# Patient Record
Sex: FEMALE | Race: WHITE | NOT HISPANIC OR LATINO | Employment: OTHER | URBAN - NONMETROPOLITAN AREA
[De-identification: names, ages, dates, MRNs, and addresses within clinical notes are randomized per-mention and may not be internally consistent; named-entity substitution may affect disease eponyms.]

---

## 2017-01-12 ENCOUNTER — TELEPHONE (OUTPATIENT)
Dept: MEDICAL GROUP | Facility: PHYSICIAN GROUP | Age: 67
End: 2017-01-12

## 2017-01-12 DIAGNOSIS — J45.20 MILD INTERMITTENT ASTHMA WITHOUT COMPLICATION: ICD-10-CM

## 2017-01-12 NOTE — TELEPHONE ENCOUNTER
Notify the patient that the insurance company would like us to try QVAR first. I sent a prescription over to the pharmacy. This is one puff 2 times a day.

## 2017-01-13 NOTE — TELEPHONE ENCOUNTER
Notified [facility] regarding Qvar RX. She will let us know how Qvar works for her.

## 2017-06-17 ENCOUNTER — OFFICE VISIT (OUTPATIENT)
Dept: URGENT CARE | Facility: PHYSICIAN GROUP | Age: 67
End: 2017-06-17
Payer: COMMERCIAL

## 2017-06-17 VITALS
OXYGEN SATURATION: 95 % | RESPIRATION RATE: 16 BRPM | BODY MASS INDEX: 30.44 KG/M2 | HEIGHT: 59 IN | TEMPERATURE: 99.6 F | HEART RATE: 67 BPM | SYSTOLIC BLOOD PRESSURE: 132 MMHG | WEIGHT: 151 LBS | DIASTOLIC BLOOD PRESSURE: 66 MMHG

## 2017-06-17 DIAGNOSIS — J45.41 MODERATE PERSISTENT ASTHMA WITH ACUTE EXACERBATION: ICD-10-CM

## 2017-06-17 DIAGNOSIS — J30.9 ALLERGIC RHINITIS, UNSPECIFIED ALLERGIC RHINITIS TRIGGER, UNSPECIFIED RHINITIS SEASONALITY: ICD-10-CM

## 2017-06-17 DIAGNOSIS — J06.9 VIRAL UPPER RESPIRATORY TRACT INFECTION: ICD-10-CM

## 2017-06-17 PROCEDURE — 99214 OFFICE O/P EST MOD 30 MIN: CPT | Performed by: EMERGENCY MEDICINE

## 2017-06-17 RX ORDER — PREDNISONE 20 MG/1
TABLET ORAL
Qty: 18 TAB | Refills: 0 | Status: SHIPPED | OUTPATIENT
Start: 2017-06-17 | End: 2018-01-24

## 2017-06-17 RX ORDER — AMOXICILLIN AND CLAVULANATE POTASSIUM 875; 125 MG/1; MG/1
1 TABLET, FILM COATED ORAL 2 TIMES DAILY
Qty: 14 TAB | Refills: 0 | Status: SHIPPED | OUTPATIENT
Start: 2017-06-17 | End: 2017-06-24

## 2017-06-17 ASSESSMENT — ENCOUNTER SYMPTOMS
HEARTBURN: 0
NAUSEA: 0
DIARRHEA: 0
SPUTUM PRODUCTION: 0
HEMOPTYSIS: 0
HOARSE VOICE: 0
HEADACHES: 1
WHEEZING: 1
SHORTNESS OF BREATH: 1
ABDOMINAL PAIN: 0
SINUS PRESSURE: 1
SORE THROAT: 1
VOMITING: 0
COUGH: 1
CHILLS: 0

## 2017-06-17 NOTE — PATIENT INSTRUCTIONS
"Use saline nasal irrigation, such as with a Neti Pot, as needed daily for relief of nasal or sinus congestion relief.  Use over-the-counter oxymetazoline (Afrin) nasal spray as needed for up to 3 days only; follow package instructions for dosing.  Use over-the-counter acetaminophen (Tylenol) as needed for pain relief; follow the package instructions for dosing.  Use the prescribed antibiotic Augmentin only if symptoms persist beyond 7 days, or re-worsen. Use an oral probiotic daily, such as Culturelle, Align, or yogurt to reduce gastrointestinal symptoms.  Go to the nearest hospital Emergency Department, or call 911, if any worsening breathing condition.      Upper Respiratory Infection, Adult  Most upper respiratory infections (URIs) are caused by a virus. A URI affects the nose, throat, and upper air passages. The most common type of URI is often called \"the common cold.\"  HOME CARE   · Take medicines only as told by your doctor.  · Gargle warm saltwater or take cough drops to comfort your throat as told by your doctor.  · Use a warm mist humidifier or inhale steam from a shower to increase air moisture. This may make it easier to breathe.  · Drink enough fluid to keep your pee (urine) clear or pale yellow.  · Eat soups and other clear broths.  · Have a healthy diet.  · Rest as needed.  · Go back to work when your fever is gone or your doctor says it is okay.  ¨ You may need to stay home longer to avoid giving your URI to others.  ¨ You can also wear a face mask and wash your hands often to prevent spread of the virus.  · Use your inhaler more if you have asthma.  · Do not use any tobacco products, including cigarettes, chewing tobacco, or electronic cigarettes. If you need help quitting, ask your doctor.  GET HELP IF:  · You are getting worse, not better.  · Your symptoms are not helped by medicine.  · You have chills.  · You are getting more short of breath.  · You have brown or red mucus.  · You have yellow or " brown discharge from your nose.  · You have pain in your face, especially when you bend forward.  · You have a fever.  · You have puffy (swollen) neck glands.  · You have pain while swallowing.  · You have white areas in the back of your throat.  GET HELP RIGHT AWAY IF:   · You have very bad or constant:  ¨ Headache.  ¨ Ear pain.  ¨ Pain in your forehead, behind your eyes, and over your cheekbones (sinus pain).  ¨ Chest pain.  · You have long-lasting (chronic) lung disease and any of the following:  ¨ Wheezing.  ¨ Long-lasting cough.  ¨ Coughing up blood.  ¨ A change in your usual mucus.  · You have a stiff neck.  · You have changes in your:  ¨ Vision.  ¨ Hearing.  ¨ Thinking.  ¨ Mood.  MAKE SURE YOU:   · Understand these instructions.  · Will watch your condition.  · Will get help right away if you are not doing well or get worse.     This information is not intended to replace advice given to you by your health care provider. Make sure you discuss any questions you have with your health care provider.     Document Released: 06/05/2009 Document Revised: 05/03/2016 Document Reviewed: 03/25/2015  [employer] Interactive Patient Education ©2016 [employer]  Bronchospasm, Adult  A bronchospasm is when the tubes that carry air in and out of your lungs (airways) spasm or tighten. During a bronchospasm it is hard to breathe. This is because the airways get smaller. A bronchospasm can be triggered by:  · Allergies. These may be to animals, pollen, food, or mold.  · Infection. This is a common cause of bronchospasm.  · Exercise.  · Irritants. These include pollution, cigarette smoke, strong odors, aerosol sprays, and paint fumes.  · Weather changes.  · Stress.  · Being emotional.  HOME CARE   · Always have a plan for getting help. Know when to call your doctor and local emergency services (911 in the [place] Know where you can get emergency care.  · Only take medicines as told by your doctor.  · If you were prescribed an  inhaler or nebulizer machine, ask your doctor how to use it correctly. Always use a spacer with your inhaler if you were given one.  · Stay calm during an attack. Try to relax and breathe more slowly.  · Control your home environment:  · Change your heating and air conditioning filter at least once a month.  · Limit your use of fireplaces and wood stoves.  · Do not  smoke. Do not  allow smoking in your home.  · Avoid perfumes and fragrances.  · Get rid of pests (such as roaches and mice) and their droppings.  · Throw away plants if you see mold on them.  · Keep your house clean and dust free.  · Replace carpet with wood, tile, or vinyl rodolfo. Carpet can trap dander and dust.  · Use allergy-proof pillows, mattress covers, and box spring covers.  · Wash bed sheets and blankets every week in hot water. Dry them in a dryer.  · Use blankets that are made of polyester or cotton.  · Wash hands frequently.  GET HELP IF:  · You have muscle aches.  · You have chest pain.  · The thick spit you spit or cough up (sputum) changes from clear or white to yellow, green, gray, or bloody.  · The thick spit you spit or cough up gets thicker.  · There are problems that may be related to the medicine you are given such as:  ¨ A rash.  ¨ Itching.  ¨ Swelling.  ¨ Trouble breathing.  GET HELP RIGHT AWAY IF:  · You feel you cannot breathe or catch your breath.  · You cannot stop coughing.  · Your treatment is not helping you breathe better.  · You have very bad chest pain.  MAKE SURE YOU:   · Understand these instructions.  · Will watch your condition.  · Will get help right away if you are not doing well or get worse.     This information is not intended to replace advice given to you by your health care provider. Make sure you discuss any questions you have with your health care provider.     Document Released: 10/15/2010 Document Revised: 01/08/2016 Document Reviewed: 06/10/2014  [employer] Interactive Patient Education ©2016 [employer]  Allergic Rhinitis  Allergic rhinitis is when the mucous membranes in the nose respond to allergens. Allergens are particles in the air that cause your body to have an allergic reaction. This causes you to release allergic antibodies. Through a chain of events, these eventually cause you to release histamine into the blood stream. Although meant to protect the body, it is this release of histamine that causes your discomfort, such as frequent sneezing, congestion, and an itchy, runny nose.   CAUSES  Seasonal allergic rhinitis (hay fever) is caused by pollen allergens that may come from grasses, trees, and weeds. Year-round allergic rhinitis (perennial allergic rhinitis) is caused by allergens such as house dust mites, pet dander, and mold spores.  SYMPTOMS  · Nasal stuffiness (congestion).  · Itchy, runny nose with sneezing and tearing of the eyes.  DIAGNOSIS  Your health care provider can help you determine the allergen or allergens that trigger your symptoms. If you and your health care provider are unable to determine the allergen, skin or blood testing may be used. Your health care provider will diagnose your condition after taking your health history and performing a physical exam. Your health care provider may assess you for other related conditions, such as asthma, pink eye, or an ear infection.  TREATMENT  Allergic rhinitis does not have a cure, but it can be controlled by:  · Medicines that block allergy symptoms. These may include allergy shots, nasal sprays, and oral antihistamines.  · Avoiding the allergen.  Hay fever may often be treated with antihistamines in pill or nasal spray forms. Antihistamines block the effects of histamine. There are over-the-counter medicines that may help with nasal congestion and swelling around the eyes. Check with your health care provider before taking or giving this medicine.  If avoiding the allergen or the medicine prescribed do not work, there are many new  medicines your health care provider can prescribe. Stronger medicine may be used if initial measures are ineffective. Desensitizing injections can be used if medicine and avoidance does not work. Desensitization is when a patient is given ongoing shots until the body becomes less sensitive to the allergen. Make sure you follow up with your health care provider if problems continue.  HOME CARE INSTRUCTIONS  It is not possible to completely avoid allergens, but you can reduce your symptoms by taking steps to limit your exposure to them. It helps to know exactly what you are allergic to so that you can avoid your specific triggers.  SEEK MEDICAL CARE IF:  · You have a fever.  · You develop a cough that does not stop easily (persistent).  · You have shortness of breath.  · You start wheezing.  · Symptoms interfere with normal daily activities.     This information is not intended to replace advice given to you by your health care provider. Make sure you discuss any questions you have with your health care provider.     Document Released: 09/12/2002 Document Revised: 01/08/2016 Document Reviewed: 08/25/2014  [employer] Interactive Patient Education ©2016 [employer] Inc.

## 2017-06-17 NOTE — PROGRESS NOTES
Subjective:      Ezio Mora is a 66 y.o. female who presents with Sinus Problem            Sinus Problem  This is a recurrent problem. Episode onset: 2 days. The problem has been gradually worsening since onset. There has been no fever. The pain is moderate. Associated symptoms include congestion, coughing, headaches, shortness of breath, sinus pressure and a sore throat. Pertinent negatives include no chills, ear pain, hoarse voice or sneezing. Treatments tried: rest. The treatment provided no relief.    notes over the past week exacerbation of allergic rhinitis, increased need for rescue inhaler for asthma.  Upcoming out of state travel.    Review of Systems   Constitutional: Positive for malaise/fatigue. Negative for chills.   HENT: Positive for congestion, sinus pressure and sore throat. Negative for ear pain, hearing loss, hoarse voice, nosebleeds and sneezing.    Respiratory: Positive for cough, shortness of breath and wheezing. Negative for hemoptysis and sputum production.    Cardiovascular: Negative for chest pain.   Gastrointestinal: Negative for heartburn, nausea, vomiting, abdominal pain and diarrhea.   Skin: Negative for rash.   Neurological: Positive for headaches.   Endo/Heme/Allergies: Positive for environmental allergies.     PMH:  has a past medical history of Post-menopause on HRT (hormone replacement therapy); ASTHMA; Rheumatic fever; Degenerative arthritis of spine; Mild intermittent asthma with acute exacerbation (6/18/2015); Weight gain (6/28/2016); Pulmonary nodule (6/28/2016); and Chronic fatigue (9/28/2016).  MEDS:   Current outpatient prescriptions:   •  predniSONE (DELTASONE) 20 MG Tab, Take 3 tablets once a day for 4 days, then 2 tablets once a day for 3 days, Disp: 18 Tab, Rfl: 0  •  Albuterol Sulfate 108 (90 BASE) MCG/ACT AEROSOL POWDER, BREATH ACTIVATED, Inhale 1-2 Puffs by mouth every 6 hours as needed (coughing, wheezing)., Disp: 1 Each, Rfl: 0  •  amoxicillin-clavulanate  "(AUGMENTIN) 875-125 MG Tab, Take 1 Tab by mouth 2 times a day for 7 days., Disp: 14 Tab, Rfl: 0  •  PROAIR  (90 BASE) MCG/ACT Aero Soln inhalation aerosol, INHALE 2 PUFFS BY MOUTH EVERY 6 HOURS AS NEEDED, Disp: 8.5 g, Rfl: 3  •  beclomethasone (QVAR) 80 MCG/ACT inhaler, Inhale 1 Puff by mouth 2 times a day., Disp: 7.3 g, Rfl: 5  •  montelukast (SINGULAIR) 10 MG Tab, TAKE 1 TABLET BY MOUTH DAILY, Disp: 30 Tab, Rfl: 11  •  fluticasone (FLONASE) 50 MCG/ACT nasal spray, Spray 1 Spray in nose every day., Disp: 16 g, Rfl: 11  •  azithromycin (ZITHROMAX) 250 MG Tab, TK 2 TS TODAY THEN 1 T PO QD ON DAYS 2-5, Disp: , Rfl: 0  •  FLOVENT DISKUS 250 MCG/BLIST AEROSOL POWDER, BREATH ACTIVATED, INHALE 1  PUFF PO BID, Disp: , Rfl: 5  •  fluticasone (FLOVENT HFA) 220 MCG/ACT AERO, Inhale 1 Puff by mouth 2 times a day., Disp: 1 Inhaler, Rfl: 3  ALLERGIES:   Allergies   Allergen Reactions   • Terramycin [Oxytetracycline]      SURGHX:   Past Surgical History   Procedure Laterality Date   • Primary c section     • Tubal ligation     • Lumbar fusion posterior       SOCHX:  reports that she has never smoked. She has never used smokeless tobacco. She reports that she drinks about 1.2 oz of alcohol per week. She reports that she does not use illicit drugs.  FH: family history includes Other in her mother; Thyroid in her child. There is no history of Heart Disease.       Objective:     /66 mmHg  Pulse 67  Temp(Src) 37.6 °C (99.6 °F)  Resp 16  Ht 1.511 m (4' 11.49\")  Wt 68.493 kg (151 lb)  BMI 30.00 kg/m2  SpO2 95%  Breastfeeding? No     Physical Exam   Constitutional: She appears well-developed and well-nourished. She is cooperative. She does not appear ill. No distress.   HENT:   Right Ear: Tympanic membrane and ear canal normal.   Left Ear: Tympanic membrane and ear canal normal.   Nose: Mucosal edema and rhinorrhea present.   Mouth/Throat: Uvula is midline and oropharynx is clear and moist.   Eyes: Conjunctivae are " normal.   Neck: Trachea normal. Neck supple.   Cardiovascular: Normal rate, regular rhythm and normal heart sounds.    Pulmonary/Chest: Effort normal. She has no decreased breath sounds. She has no wheezes. She has no rhonchi. She has no rales.   Lymphadenopathy:     She has no cervical adenopathy.   Neurological: She is alert.   Skin: Skin is warm and dry.   Psychiatric: She has a normal mood and affect.               Assessment/Plan:     1. Allergic rhinitis, unspecified allergic rhinitis trigger, unspecified rhinitis seasonality  Recommended nasal saline irrigation daily, OTC inhaled nasal steroid daily, OTC nonsedating antihistamine when necessary as tolerated.    2. Moderate persistent asthma with acute exacerbation  - predniSONE (DELTASONE) 20 MG Tab; Take 3 tablets once a day for 4 days, then 2 tablets once a day for 3 days  Dispense: 18 Tab; Refill: 0  - Albuterol Sulfate 108 (90 BASE) MCG/ACT AEROSOL POWDER, BREATH ACTIVATED; Inhale 1-2 Puffs by mouth every 6 hours as needed (coughing, wheezing).  Dispense: 1 Each; Refill: 0    3. Viral upper respiratory tract infection  Recommended supportive care measures, including rest, increasing oral fluid intake and use of over-the-counter medications for relief of symptoms.  Wait-and-see antibiotic provided due to history of recurring sinusitis and upcoming travel  - amoxicillin-clavulanate (AUGMENTIN) 875-125 MG Tab; Take 1 Tab by mouth 2 times a day for 7 days.  Dispense: 14 Tab; Refill: 0

## 2017-06-17 NOTE — MR AVS SNAPSHOT
"        Ezio Mora   2017 2:35 PM   Office Visit   MRN: [id number]    Department:  [facility]   Dept Phone:  [phone]    Description:  Female : 1950   Provider:  Dinesh Hughes M.D.           Reason for Visit     Sinus Problem x2days nasal congestion/pain, headache, ear pain      Allergies as of 2017     Allergen Noted Reactions    Terramycin [Oxytetracycline] 2015         You were diagnosed with     Allergic rhinitis, unspecified allergic rhinitis trigger, unspecified rhinitis seasonality   [9366775]       Moderate persistent asthma with acute exacerbation   [9690676]       Viral upper respiratory tract infection   [[id number]]         Vital Signs     Blood Pressure Pulse Temperature Respirations Height Weight    132/66 mmHg 67 37.6 °C (99.6 °F) 16 1.511 m (4' 11.49\") 68.493 kg (151 lb)    Body Mass Index Oxygen Saturation Breastfeeding? Smoking Status          30.00 kg/m2 95% No Never Smoker         Basic Information     Date Of Birth Sex Race Ethnicity Preferred Language    1950 Female White Non- English      Problem List              ICD-10-CM Priority Class Noted - Resolved    Asthma J45.909   10/28/2014 - Present    Pulmonary nodule R91.1   2016 - Present    Blocked eustachian tube H68.109   2016 - Present      Health Maintenance        Date Due Completion Dates    IMM ZOSTER VACCINE 10/20/2011 ---    BONE DENSITY 10/20/2016 ---    IMM PNEUMOCOCCAL 65+ (ADULT) LOW/MEDIUM RISK SERIES (1 of 2 - PCV13) 10/20/2016 ---    PAP SMEAR 2017 (Done)    Override on 2014: Done ([facility])    MAMMOGRAM 2017, 10/28/2012 (Done)    Override on 10/28/2012: Done (banner)    COLONOSCOPY 10/28/2019 10/28/2009 (Done)    Override on 10/28/2009: Done (Gastrointerologist Kwan)    IMM DTaP/Tdap/Td Vaccine (2 - Td) 2022            Current Immunizations     Tdap Vaccine 2012      Below and/or attached are the medications " your provider expects you to take. Review all of your home medications and newly ordered medications with your provider and/or pharmacist. Follow medication instructions as directed by your provider and/or pharmacist. Please keep your medication list with you and share with your provider. Update the information when medications are discontinued, doses are changed, or new medications (including over-the-counter products) are added; and carry medication information at all times in the event of emergency situations     Allergies:  TERRAMYCIN - (reactions not documented)               Medications  Valid as of: June 17, 2017 -  2:55 PM    Generic Name Brand Name Tablet Size Instructions for use    Albuterol Sulfate (Aero Soln) PROAIR  (90 BASE) MCG/ACT INHALE 2 PUFFS BY MOUTH EVERY 6 HOURS AS NEEDED        Albuterol Sulfate (AEROSOL POWDER, BREATH ACTIVATED) Albuterol Sulfate 108 (90 BASE) MCG/ACT Inhale 1-2 Puffs by mouth every 6 hours as needed (coughing, wheezing).        Amoxicillin-Pot Clavulanate (Tab) AUGMENTIN 875-125 MG Take 1 Tab by mouth 2 times a day for 7 days.        Azithromycin (Tab) ZITHROMAX 250 MG TK 2 TS TODAY THEN 1 T PO QD ON DAYS 2-5        Beclomethasone Dipropionate (Aero Soln) QVAR 80 MCG/ACT Inhale 1 Puff by mouth 2 times a day.        Fluticasone Propionate (Suspension) FLONASE 50 MCG/ACT Spray 1 Spray in nose every day.        Fluticasone Propionate (Inhal) (AEROSOL POWDER, BREATH ACTIVATED) FLOVENT DISKUS 250 MCG/BLIST INHALE 1  PUFF PO BID        Fluticasone Propionate HFA (Aerosol) FLOVENT  MCG/ACT Inhale 1 Puff by mouth 2 times a day.        Montelukast Sodium (Tab) SINGULAIR 10 MG TAKE 1 TABLET BY MOUTH DAILY        PredniSONE (Tab) DELTASONE 20 MG Take 3 tablets once a day for 4 days, then 2 tablets once a day for 3 days        .                 Medicines prescribed today were sent to:     [id number] - [place], [place] - [address] AT [facility] [address]     [address] [place] [address]    Phone: [phone] Fax: [phone]    Open 24 Hours?: No      Medication refill instructions:       If your prescription bottle indicates you have medication refills left, it is not necessary to call your provider’s office. Please contact your pharmacy and they will refill your medication.    If your prescription bottle indicates you do not have any refills left, you may request refills at any time through one of the following ways: The online [employer] system (except Urgent Care), by calling your provider’s office, or by asking your pharmacy to contact your provider’s office with a refill request. Medication refills are processed only during regular business hours and may not be available until the next business day. Your provider may request additional information or to have a follow-up visit with you prior to refilling your medication.   *Please Note: Medication refills are assigned a new Rx number when refilled electronically. Your pharmacy may indicate that no refills were authorized even though a new prescription for the same medication is available at the pharmacy. Please request the medicine by name with the pharmacy before contacting your provider for a refill.        Instructions    Use saline nasal irrigation, such as with a Neti Pot, as needed daily for relief of nasal or sinus congestion relief.  Use over-the-counter oxymetazoline (Afrin) nasal spray as needed for up to 3 days only; follow package instructions for dosing.  Use over-the-counter acetaminophen (Tylenol) as needed for pain relief; follow the package instructions for dosing.  Use the prescribed antibiotic Augmentin only if symptoms persist beyond 7 days, or re-worsen. Use an oral probiotic daily, such as Culturelle, Align, or yogurt to reduce gastrointestinal symptoms.  Go to the nearest hospital Emergency Department, or call 911, if any worsening breathing condition.      Upper Respiratory Infection,  "Adult  Most upper respiratory infections (URIs) are caused by a virus. A URI affects the nose, throat, and upper air passages. The most common type of URI is often called \"the common cold.\"  HOME CARE   · Take medicines only as told by your doctor.  · Gargle warm saltwater or take cough drops to comfort your throat as told by your doctor.  · Use a warm mist humidifier or inhale steam from a shower to increase air moisture. This may make it easier to breathe.  · Drink enough fluid to keep your pee (urine) clear or pale yellow.  · Eat soups and other clear broths.  · Have a healthy diet.  · Rest as needed.  · Go back to work when your fever is gone or your doctor says it is okay.  ¨ You may need to stay home longer to avoid giving your URI to others.  ¨ You can also wear a face mask and wash your hands often to prevent spread of the virus.  · Use your inhaler more if you have asthma.  · Do not use any tobacco products, including cigarettes, chewing tobacco, or electronic cigarettes. If you need help quitting, ask your doctor.  GET HELP IF:  · You are getting worse, not better.  · Your symptoms are not helped by medicine.  · You have chills.  · You are getting more short of breath.  · You have brown or red mucus.  · You have yellow or brown discharge from your nose.  · You have pain in your face, especially when you bend forward.  · You have a fever.  · You have puffy (swollen) neck glands.  · You have pain while swallowing.  · You have white areas in the back of your throat.  GET HELP RIGHT AWAY IF:   · You have very bad or constant:  ¨ Headache.  ¨ Ear pain.  ¨ Pain in your forehead, behind your eyes, and over your cheekbones (sinus pain).  ¨ Chest pain.  · You have long-lasting (chronic) lung disease and any of the following:  ¨ Wheezing.  ¨ Long-lasting cough.  ¨ Coughing up blood.  ¨ A change in your usual mucus.  · You have a stiff neck.  · You have changes in " your:  ¨ Vision.  ¨ Hearing.  ¨ Thinking.  ¨ Mood.  MAKE SURE YOU:   · Understand these instructions.  · Will watch your condition.  · Will get help right away if you are not doing well or get worse.     This information is not intended to replace advice given to you by your health care provider. Make sure you discuss any questions you have with your health care provider.     Document Released: 06/05/2009 Document Revised: 05/03/2016 Document Reviewed: 03/25/2015  [employer] Interactive Patient Education ©2016 [employer] Inc.  Bronchospasm, Adult  A bronchospasm is when the tubes that carry air in and out of your lungs (airways) spasm or tighten. During a bronchospasm it is hard to breathe. This is because the airways get smaller. A bronchospasm can be triggered by:  · Allergies. These may be to animals, pollen, food, or mold.  · Infection. This is a common cause of bronchospasm.  · Exercise.  · Irritants. These include pollution, cigarette smoke, strong odors, aerosol sprays, and paint fumes.  · Weather changes.  · Stress.  · Being emotional.  HOME CARE   · Always have a plan for getting help. Know when to call your doctor and local emergency services (911 in the [place] Know where you can get emergency care.  · Only take medicines as told by your doctor.  · If you were prescribed an inhaler or nebulizer machine, ask your doctor how to use it correctly. Always use a spacer with your inhaler if you were given one.  · Stay calm during an attack. Try to relax and breathe more slowly.  · Control your home environment:  · Change your heating and air conditioning filter at least once a month.  · Limit your use of fireplaces and wood stoves.  · Do not  smoke. Do not  allow smoking in your home.  · Avoid perfumes and fragrances.  · Get rid of pests (such as roaches and mice) and their droppings.  · Throw away plants if you see mold on them.  · Keep your house clean and dust free.  · Replace carpet with wood, tile, or vinyl  rodolfo. Carpet can trap dander and dust.  · Use allergy-proof pillows, mattress covers, and box spring covers.  · Wash bed sheets and blankets every week in hot water. Dry them in a dryer.  · Use blankets that are made of polyester or cotton.  · Wash hands frequently.  GET HELP IF:  · You have muscle aches.  · You have chest pain.  · The thick spit you spit or cough up (sputum) changes from clear or white to yellow, green, gray, or bloody.  · The thick spit you spit or cough up gets thicker.  · There are problems that may be related to the medicine you are given such as:  ¨ A rash.  ¨ Itching.  ¨ Swelling.  ¨ Trouble breathing.  GET HELP RIGHT AWAY IF:  · You feel you cannot breathe or catch your breath.  · You cannot stop coughing.  · Your treatment is not helping you breathe better.  · You have very bad chest pain.  MAKE SURE YOU:   · Understand these instructions.  · Will watch your condition.  · Will get help right away if you are not doing well or get worse.     This information is not intended to replace advice given to you by your health care provider. Make sure you discuss any questions you have with your health care provider.     Document Released: 10/15/2010 Document Revised: 01/08/2016 Document Reviewed: 06/10/2014  [employer] Interactive Patient Education ©2016 [employer]  Allergic Rhinitis  Allergic rhinitis is when the mucous membranes in the nose respond to allergens. Allergens are particles in the air that cause your body to have an allergic reaction. This causes you to release allergic antibodies. Through a chain of events, these eventually cause you to release histamine into the blood stream. Although meant to protect the body, it is this release of histamine that causes your discomfort, such as frequent sneezing, congestion, and an itchy, runny nose.   CAUSES  Seasonal allergic rhinitis (hay fever) is caused by pollen allergens that may come from grasses, trees, and weeds. Year-round allergic  rhinitis (perennial allergic rhinitis) is caused by allergens such as house dust mites, pet dander, and mold spores.  SYMPTOMS  · Nasal stuffiness (congestion).  · Itchy, runny nose with sneezing and tearing of the eyes.  DIAGNOSIS  Your health care provider can help you determine the allergen or allergens that trigger your symptoms. If you and your health care provider are unable to determine the allergen, skin or blood testing may be used. Your health care provider will diagnose your condition after taking your health history and performing a physical exam. Your health care provider may assess you for other related conditions, such as asthma, pink eye, or an ear infection.  TREATMENT  Allergic rhinitis does not have a cure, but it can be controlled by:  · Medicines that block allergy symptoms. These may include allergy shots, nasal sprays, and oral antihistamines.  · Avoiding the allergen.  Hay fever may often be treated with antihistamines in pill or nasal spray forms. Antihistamines block the effects of histamine. There are over-the-counter medicines that may help with nasal congestion and swelling around the eyes. Check with your health care provider before taking or giving this medicine.  If avoiding the allergen or the medicine prescribed do not work, there are many new medicines your health care provider can prescribe. Stronger medicine may be used if initial measures are ineffective. Desensitizing injections can be used if medicine and avoidance does not work. Desensitization is when a patient is given ongoing shots until the body becomes less sensitive to the allergen. Make sure you follow up with your health care provider if problems continue.  HOME CARE INSTRUCTIONS  It is not possible to completely avoid allergens, but you can reduce your symptoms by taking steps to limit your exposure to them. It helps to know exactly what you are allergic to so that you can avoid your specific triggers.  SEEK MEDICAL  CARE IF:  · You have a fever.  · You develop a cough that does not stop easily (persistent).  · You have shortness of breath.  · You start wheezing.  · Symptoms interfere with normal daily activities.     This information is not intended to replace advice given to you by your health care provider. Make sure you discuss any questions you have with your health care provider.     Document Released: 09/12/2002 Document Revised: 01/08/2016 Document Reviewed: 08/25/2014  [employer] Interactive Patient Education ©2016 Elsevier Inc.            ipsy Access Code: Activation code not generated  Current ipsy Status: Active

## 2017-08-07 ENCOUNTER — OFFICE VISIT (OUTPATIENT)
Dept: MEDICAL GROUP | Facility: PHYSICIAN GROUP | Age: 67
End: 2017-08-07
Payer: COMMERCIAL

## 2017-08-07 VITALS
BODY MASS INDEX: 30.24 KG/M2 | HEART RATE: 71 BPM | HEIGHT: 59 IN | WEIGHT: 150 LBS | RESPIRATION RATE: 16 BRPM | SYSTOLIC BLOOD PRESSURE: 162 MMHG | DIASTOLIC BLOOD PRESSURE: 78 MMHG | TEMPERATURE: 97.9 F | OXYGEN SATURATION: 98 %

## 2017-08-07 DIAGNOSIS — J45.40 MODERATE PERSISTENT ASTHMA WITHOUT COMPLICATION: ICD-10-CM

## 2017-08-07 DIAGNOSIS — J45.20 MILD INTERMITTENT ASTHMA WITHOUT COMPLICATION: ICD-10-CM

## 2017-08-07 DIAGNOSIS — R00.2 PALPITATIONS: ICD-10-CM

## 2017-08-07 PROCEDURE — 99214 OFFICE O/P EST MOD 30 MIN: CPT | Performed by: INTERNAL MEDICINE
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
## 2017-08-07 NOTE — MR AVS SNAPSHOT
"        Ezio Mora   2017 8:40 AM   Office Visit   MRN: [id number]    Department:  [facility]   Dept Phone:  [phone]    Description:  Female : 1950   Provider:  Lion Masters M.D.           Reason for Visit     Shortness of Breath           Allergies as of 2017     Allergen Noted Reactions    Terramycin [Oxytetracycline] 2015         You were diagnosed with     Mild intermittent asthma without complication   [462293]       Palpitations   [785.1.ICD-9-CM]       Moderate persistent asthma without complication   [504003]         Vital Signs     Blood Pressure Pulse Temperature Respirations Height Weight    162/78 mmHg 71 36.6 °C (97.9 °F) 16 1.499 m (4' 11\") 68.04 kg (150 lb)    Body Mass Index Oxygen Saturation Smoking Status             30.28 kg/m2 98% Never Smoker          Basic Information     Date Of Birth Sex Race Ethnicity Preferred Language    1950 Female White Non- English      Problem List              ICD-10-CM Priority Class Noted - Resolved    Moderate persistent asthma without complication J45.40   10/28/2014 - Present    Pulmonary nodule R91.1   2016 - Present    Blocked eustachian tube H68.109   2016 - Present    Palpitations R00.2   2017 - Present      Health Maintenance        Date Due Completion Dates    IMM ZOSTER VACCINE 10/20/2011 ---    BONE DENSITY 10/20/2016 ---    IMM PNEUMOCOCCAL 65+ (ADULT) LOW/MEDIUM RISK SERIES (1 of 2 - PCV13) 10/20/2016 ---    PAP SMEAR 2017 (Done)    Override on 2014: Done ([facility])    IMM INFLUENZA (1) 2017 ---    MAMMOGRAM 2017, 10/28/2012 (Done)    Override on 10/28/2012: Done (banner)    COLONOSCOPY 10/28/2019 10/28/2009 (Done)    Override on 10/28/2009: Done (Gastrointerologist [facility]wan)    IMM DTaP/Tdap/Td Vaccine (2 - Td) 2022            Current Immunizations     Tdap Vaccine 2012      Below and/or attached are the medications your provider " expects you to take. Review all of your home medications and newly ordered medications with your provider and/or pharmacist. Follow medication instructions as directed by your provider and/or pharmacist. Please keep your medication list with you and share with your provider. Update the information when medications are discontinued, doses are changed, or new medications (including over-the-counter products) are added; and carry medication information at all times in the event of emergency situations     Allergies:  TERRAMYCIN - (reactions not documented)               Medications  Valid as of: August 07, 2017 - 10:36 AM    Generic Name Brand Name Tablet Size Instructions for use    Albuterol Sulfate (Aero Soln) PROAIR  (90 BASE) MCG/ACT INHALE 2 PUFFS BY MOUTH EVERY 6 HOURS AS NEEDED        Albuterol Sulfate (AEROSOL POWDER, BREATH ACTIVATED) Albuterol Sulfate 108 (90 BASE) MCG/ACT Inhale 1-2 Puffs by mouth every 6 hours as needed (coughing, wheezing).        Beclomethasone Dipropionate (Aero Soln) QVAR 80 MCG/ACT Inhale 1 Puff by mouth 2 times a day.        Fluticasone Propionate (Suspension) FLONASE 50 MCG/ACT Spray 1 Spray in nose every day.        Fluticasone Propionate (Inhal) (AEROSOL POWDER, BREATH ACTIVATED) FLOVENT DISKUS 250 MCG/BLIST INHALE 1  PUFF PO BID        Montelukast Sodium (Tab) SINGULAIR 10 MG TAKE 1 TABLET BY MOUTH DAILY        PredniSONE (Tab) DELTASONE 20 MG Take 3 tablets once a day for 4 days, then 2 tablets once a day for 3 days        Salmeterol Xinafoate (AEROSOL POWDER, BREATH ACTIVATED) SEREVENT 50 MCG/DOSE Inhale 1 Puff by mouth 2 times a day.        .                 Medicines prescribed today were sent to:     [id number] - [place], [place] - [address]    [address] [place] [address]    Phone: [phone] Fax: [phone]    Open 24 Hours?: No      Medication refill instructions:       If your prescription bottle indicates you have  medication refills left, it is not necessary to call your provider’s office. Please contact your pharmacy and they will refill your medication.    If your prescription bottle indicates you do not have any refills left, you may request refills at any time through one of the following ways: The online [employer] system (except Urgent Care), by calling your provider’s office, or by asking your pharmacy to contact your provider’s office with a refill request. Medication refills are processed only during regular business hours and may not be available until the next business day. Your provider may request additional information or to have a follow-up visit with you prior to refilling your medication.   *Please Note: Medication refills are assigned a new Rx number when refilled electronically. Your pharmacy may indicate that no refills were authorized even though a new prescription for the same medication is available at the pharmacy. Please request the medicine by name with the pharmacy before contacting your provider for a refill.        Referral     A referral request has been sent to our patient care coordination department. Please allow 3-5 business days for us to process this request and contact you either by phone or mail. If you do not hear from us by the 5th business day, please call us at [phone].           [facility] Access Code: Activation code not generated  Current [facility] [facility] Status: Active
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
## 2017-08-28 ENCOUNTER — OFFICE VISIT (OUTPATIENT)
Dept: MEDICAL GROUP | Facility: PHYSICIAN GROUP | Age: 67
End: 2017-08-28
Payer: COMMERCIAL

## 2017-08-28 VITALS
HEART RATE: 85 BPM | BODY MASS INDEX: 30.24 KG/M2 | HEIGHT: 59 IN | TEMPERATURE: 98.1 F | OXYGEN SATURATION: 95 % | WEIGHT: 150 LBS | DIASTOLIC BLOOD PRESSURE: 84 MMHG | SYSTOLIC BLOOD PRESSURE: 136 MMHG

## 2017-08-28 DIAGNOSIS — F41.9 ANXIETY: ICD-10-CM

## 2017-08-28 DIAGNOSIS — E66.9 OBESITY (BMI 30-39.9): ICD-10-CM

## 2017-08-28 PROCEDURE — 99214 OFFICE O/P EST MOD 30 MIN: CPT | Performed by: INTERNAL MEDICINE

## 2017-08-28 RX ORDER — FLUOXETINE HYDROCHLORIDE 20 MG/1
20 CAPSULE ORAL DAILY
Qty: 60 CAP | Refills: 1 | Status: SHIPPED | OUTPATIENT
Start: 2017-08-28 | End: 2018-01-24

## 2017-08-28 NOTE — PROGRESS NOTES
"Subjective:   Ezio Mora is a 66 y.o. female here today for anxiety, obesity    Anxiety  Pt reports that she has been having symptoms of irritability and anxiety. She has been having a lot of life stressors including remodeling of the house, her  retiring, and getting a puppy. She has been having difficulty with poor sleep and reports she will \"cry at the drop of a hat\". It has been going on for 5-6 months and worsening since then. She now gets anxiety over little things that didn't cause her problems in the past. She reports depressed mood associated with it. She has associated problems with poor energy, poor concentration, and altered appetite. She denies suicidal ideation.     She denies periods of sleeplessness and excessive energy. Pt was on prozac many years ago and reports it helped back then. She has not been seen by a therapist in the past.     She was seen for palpitations recently. She has not had recurrence since starting the additional asthma treatment at last visit.        Current medicines (including changes today)  Current Outpatient Prescriptions   Medication Sig Dispense Refill   • fluoxetine (PROZAC) 20 MG Cap Take 1 Cap by mouth every day. 60 Cap 1   • salmeterol (SEREVENT) 50 MCG/DOSE AEROSOL POWDER, BREATH ACTIVATED Inhale 1 Puff by mouth 2 times a day. 1 Each 3   • predniSONE (DELTASONE) 20 MG Tab Take 3 tablets once a day for 4 days, then 2 tablets once a day for 3 days 18 Tab 0   • Albuterol Sulfate 108 (90 BASE) MCG/ACT AEROSOL POWDER, BREATH ACTIVATED Inhale 1-2 Puffs by mouth every 6 hours as needed (coughing, wheezing). 1 Each 0   • PROAIR  (90 BASE) MCG/ACT Aero Soln inhalation aerosol INHALE 2 PUFFS BY MOUTH EVERY 6 HOURS AS NEEDED 8.5 g 3   • beclomethasone (QVAR) 80 MCG/ACT inhaler Inhale 1 Puff by mouth 2 times a day. 7.3 g 5   • FLOVENT DISKUS 250 MCG/BLIST AEROSOL POWDER, BREATH ACTIVATED INHALE 1  PUFF PO BID  5   • montelukast (SINGULAIR) 10 MG Tab TAKE 1 TABLET " "BY MOUTH DAILY 30 Tab 11   • fluticasone (FLONASE) 50 MCG/ACT nasal spray Spray 1 Spray in nose every day. 16 g 11     No current facility-administered medications for this visit.      She  has a past medical history of ASTHMA; Chronic fatigue (9/28/2016); Degenerative arthritis of spine; Mild intermittent asthma with acute exacerbation (6/18/2015); Post-menopause on HRT (hormone replacement therapy); Pulmonary nodule (6/28/2016); Rheumatic fever; and Weight gain (6/28/2016).    ROS   No chest pain, no shortness of breath, no suicidal ideation     Objective:     Blood pressure 136/84, pulse 85, temperature 36.7 °C (98.1 °F), height 1.499 m (4' 11\"), weight 68 kg (150 lb), SpO2 95 %. Body mass index is 30.3 kg/m².   Physical Exam:  Constitutional: Alert & oriented, no acute distress  Eye: Conjunctiva clear, lids normal, no discharge  ENMT: Lips without lesions, normal external nose and ears  Neck: Trachea midline, no masses, no thyromegaly. No cervical or supraclavicular lymphadenopathy  Respiratory: Unlabored respiratory effort, lungs clear to auscultation, no wheezes, no ronchi  Cardiovascular: Normal S1, S2, no murmur  Skin: Warm, dry, good turgor, no rashes in visible areas  Neuro: No overt focal neurologic deficits  Psych: Normal mood and affect      Assessment and Plan:   The following treatment plan was discussed    1. Anxiety  Will start patient on fluoxetine 20 mg daily. Will have her follow up in 2 months to assess response to medication. Pt does not have suicidal ideation or symptoms concerning for manic episodes    2. Obesity (BMI 30-39.9)  - Patient identified as having weight management issue.  Appropriate orders and counseling given.      Followup: Return in about 2 months (around 10/28/2017).    Please note that this dictation was created using voice recognition software. I have made every reasonable attempt to correct obvious errors, but I expect that there are errors of grammar and possibly content " that I did not discover before finalizing the note.

## 2017-08-28 NOTE — ASSESSMENT & PLAN NOTE
"Pt reports that she has been having symptoms of irritability and anxiety. She has been having a lot of life stressors including remodeling of the house, her  retiring, and getting a puppy. She has been having difficulty with poor sleep and reports she will \"cry at the drop of a hat\". It has been going on for 5-6 months and worsening since then. She now gets anxiety over little things that didn't cause her problems in the past. She reports depressed mood associated with it. She has associated problems with poor energy, poor concentration, and altered appetite. She denies suicidal ideation.     She denies periods of sleeplessness and excessive energy. Pt was on prozac many years ago and reports it helped back then. She has not been seen by a therapist in the past.     She was seen for palpitations recently. She has not had recurrence since starting the additional asthma treatment at last visit.   "

## 2017-10-31 RX ORDER — MONTELUKAST SODIUM 10 MG/1
TABLET ORAL
Qty: 90 TAB | Refills: 3 | Status: SHIPPED | OUTPATIENT
Start: 2017-10-31 | End: 2018-11-01 | Stop reason: SDUPTHER

## 2017-10-31 NOTE — TELEPHONE ENCOUNTER
Was the patient seen in the last year in this department? No     Does patient have an active prescription for medications requested? No     Received Request Via: Pharmacy      Pt met protocol?: No Last OV 09/2016

## 2017-11-06 NOTE — TELEPHONE ENCOUNTER
Was the patient seen in the last year in this department? Yes     Does patient have an active prescription for medications requested? No     Received Request Via: Pharmacy      Pt met protocol?: Yes Last OV 12/2016

## 2017-11-15 ENCOUNTER — TELEPHONE (OUTPATIENT)
Dept: MEDICAL GROUP | Facility: PHYSICIAN GROUP | Age: 67
End: 2017-11-15

## 2017-11-15 DIAGNOSIS — J45.40 MODERATE PERSISTENT ASTHMA WITHOUT COMPLICATION: ICD-10-CM

## 2017-11-15 NOTE — TELEPHONE ENCOUNTER
1. Caller Name: [facility]                      Call Back Number: [phone]    2. Message: [facility] stated that Mary has always been on QVAR 40mcg and she was prescribed 80mcg. Mary states that it was never discussed with her and would like to stay with 40mcg if QVAR. They are requesting a RX sent over.     3. Patient approves office to leave a detailed voicemail/MyChart message: N\A

## 2017-11-15 NOTE — TELEPHONE ENCOUNTER
Dr Strong- It looks like pt has been on Qvar 80mcg but states she would rather be on the 40mcg. Please change as you see fit.

## 2017-11-16 NOTE — TELEPHONE ENCOUNTER
Ok, rx for Qvar, however, we show she has flovent on her list so please clarify with the patient she is not using flovent as well.

## 2017-11-16 NOTE — TELEPHONE ENCOUNTER
Patient does not use the Flovent anymore, she only uses that PRN.  She only uses the QVAR on a daily bases

## 2018-01-24 ENCOUNTER — OFFICE VISIT (OUTPATIENT)
Dept: URGENT CARE | Facility: PHYSICIAN GROUP | Age: 68
End: 2018-01-24
Payer: COMMERCIAL

## 2018-01-24 VITALS
TEMPERATURE: 98.7 F | HEIGHT: 59 IN | BODY MASS INDEX: 29.84 KG/M2 | SYSTOLIC BLOOD PRESSURE: 148 MMHG | RESPIRATION RATE: 16 BRPM | OXYGEN SATURATION: 96 % | DIASTOLIC BLOOD PRESSURE: 88 MMHG | WEIGHT: 148 LBS | HEART RATE: 72 BPM

## 2018-01-24 DIAGNOSIS — R05.9 COUGH: ICD-10-CM

## 2018-01-24 PROCEDURE — 99214 OFFICE O/P EST MOD 30 MIN: CPT | Performed by: PHYSICIAN ASSISTANT

## 2018-01-24 RX ORDER — BENZONATATE 200 MG/1
200 CAPSULE ORAL 3 TIMES DAILY PRN
Qty: 30 CAP | Refills: 0 | Status: SHIPPED | OUTPATIENT
Start: 2018-01-24 | End: 2019-11-25

## 2018-01-24 RX ORDER — PREDNISONE 10 MG/1
TABLET ORAL
Qty: 20 TAB | Refills: 0 | Status: SHIPPED | OUTPATIENT
Start: 2018-01-24 | End: 2019-11-25

## 2018-01-24 ASSESSMENT — ENCOUNTER SYMPTOMS
SPUTUM PRODUCTION: 0
WHEEZING: 1
SHORTNESS OF BREATH: 1
RHINORRHEA: 0
MYALGIAS: 0
COUGH: 1
SINUS PAIN: 0
HEMOPTYSIS: 0
FEVER: 0
CHILLS: 0

## 2018-01-24 NOTE — PATIENT INSTRUCTIONS
Cough, Adult   A cough is a reflex that helps clear your throat and airways. It can help heal the body or may be a reaction to an irritated airway. A cough may only last 2 or 3 weeks (acute) or may last more than 8 weeks (chronic).   CAUSES  Acute cough:  · Viral or bacterial infections.  Chronic cough:  · Infections.  · Allergies.  · Asthma.  · Post-nasal drip.  · Smoking.  · Heartburn or acid reflux.  · Some medicines.  · Chronic lung problems (COPD).  · Cancer.  SYMPTOMS   · Cough.  · Fever.  · Chest pain.  · Increased breathing rate.  · High-pitched whistling sound when breathing (wheezing).  · Colored mucus that you cough up (sputum).  TREATMENT   · A bacterial cough may be treated with antibiotic medicine.  · A viral cough must run its course and will not respond to antibiotics.  · Your caregiver may recommend other treatments if you have a chronic cough.  HOME CARE INSTRUCTIONS   · Only take over-the-counter or prescription medicines for pain, discomfort, or fever as directed by your caregiver. Use cough suppressants only as directed by your caregiver.  · Use a cold steam vaporizer or humidifier in your bedroom or home to help loosen secretions.  · Sleep in a semi-upright position if your cough is worse at night.  · Rest as needed.  · Stop smoking if you smoke.  SEEK IMMEDIATE MEDICAL CARE IF:   · You have pus in your sputum.  · Your cough starts to worsen.  · You cannot control your cough with suppressants and are losing sleep.  · You begin coughing up blood.  · You have difficulty breathing.  · You develop pain which is getting worse or is uncontrolled with medicine.  · You have a fever.  MAKE SURE YOU:   · Understand these instructions.  · Will watch your condition.  · Will get help right away if you are not doing well or get worse.     This information is not intended to replace advice given to you by your health care provider. Make sure you discuss any questions you have with your health care provider.      Document Released: 06/15/2012 Document Revised: 03/11/2013 Document Reviewed: 02/24/2016  [employer] Interactive Patient Education ©2016 [employer]

## 2018-01-24 NOTE — PROGRESS NOTES
Subjective:      Ezio Mora is a 67 y.o. female who presents with Cough (x8days)            Nonproductive cough for the last 8-10 days which does not seem to be improving. She reports shortness of breath, wheezing, and chest tightness which improves somewhat with her albuterol, but not as much as she would like. No fevers, chills, sputum, recent illnesses, or other complaints. She does have asthma and is concerned about these new symptoms.      Cough   This is a new problem. The current episode started 1 to 4 weeks ago. The problem has been gradually worsening. The cough is non-productive. Associated symptoms include shortness of breath and wheezing. Pertinent negatives include no chest pain, chills, fever, hemoptysis, myalgias, nasal congestion or rhinorrhea. Nothing aggravates the symptoms. She has tried a beta-agonist inhaler for the symptoms. The treatment provided mild relief. Her past medical history is significant for asthma.       Review of Systems   Constitutional: Negative for chills, fever and malaise/fatigue.   HENT: Negative for congestion, rhinorrhea and sinus pain.    Respiratory: Positive for cough, shortness of breath and wheezing. Negative for hemoptysis and sputum production.    Cardiovascular: Negative for chest pain and leg swelling.   Musculoskeletal: Negative for myalgias.     Allergies:Terramycin [oxytetracycline]    Current Outpatient Prescriptions Ordered in [facility]   Medication Sig Dispense Refill   • predniSONE (DELTASONE) 10 MG Tab 2 tabs BID x 2 days, 3 tabs daily x 2 days, 2 tabs daily x 2 days, 1 tab daily x 2 days 20 Tab 0   • benzonatate (TESSALON) 200 MG capsule Take 1 Cap by mouth 3 times a day as needed for Cough. 30 Cap 0   • beclomethasone (QVAR) 40 MCG/ACT inhaler Inhale 1 Puff by mouth 2 Times a Day. 1 Inhaler 5   • montelukast (SINGULAIR) 10 MG Tab TAKE 1 TABLET BY MOUTH EVERY DAY 90 Tab 3   • PROAIR  (90 BASE) MCG/ACT Aero Soln inhalation aerosol INHALE 2 PUFFS BY  "MOUTH EVERY 6 HOURS AS NEEDED 8.5 g 3     No current Epic-ordered facility-administered medications on file.        Past Medical History:   Diagnosis Date   • ASTHMA    • Chronic fatigue 2016   • Degenerative arthritis of spine    • Mild intermittent asthma with acute exacerbation 2015   • Post-menopause on HRT (hormone replacement therapy)    • Pulmonary nodule 2016    Pulmonary, stable CT 8/15   • Rheumatic fever    • Weight gain 2016       Social History   Substance Use Topics   • Smoking status: Never Smoker   • Smokeless tobacco: Never Used   • Alcohol use 1.2 oz/week     2 Glasses of wine per week      Comment: occ wine       Family Status   Relation Status   • Mother    • Father    • Child Alive     Family History   Problem Relation Age of Onset   • Heart Disease Neg Hx    • Other Mother      global decline due to age   • Thyroid Child           Objective:     /88   Pulse 72   Temp 37.1 °C (98.7 °F)   Resp 16   Ht 1.499 m (4' 11.02\")   Wt 67.1 kg (148 lb)   SpO2 96%   Breastfeeding? No   BMI 29.88 kg/m²      Physical Exam   Constitutional: She is oriented to person, place, and time. She appears well-developed and well-nourished. No distress.   HENT:   Head: Normocephalic and atraumatic.   Eyes: Right eye exhibits no discharge. Left eye exhibits no discharge.   Neck: Normal range of motion. Neck supple.   Cardiovascular: Normal rate and regular rhythm.    Pulmonary/Chest: Effort normal and breath sounds normal. She has no wheezes. She has no rales.   Nonproductive cough   Neurological: She is alert and oriented to person, place, and time.   Skin: Skin is warm and dry. She is not diaphoretic.   Psychiatric: She has a normal mood and affect. Her behavior is normal. Judgment and thought content normal.   Nursing note and vitals reviewed.              Assessment/Plan:     1. Cough  predniSONE (DELTASONE) 10 MG Tab    benzonatate (TESSALON) 200 MG capsule    Ongoing " for 8-10 days with shortness of breath and wheezing. No fever or sputum. Discussed options. Start prednisone and Tessalon. Follow-up with PCP       [employer] Interactive Patient Education given: Cough    Please note that this dictation was created using voice recognition software. I have made every reasonable attempt to correct obvious errors, but I expect that there are errors of grammar and possibly content that I did not discover before finalizing the note.

## 2018-03-16 ENCOUNTER — TELEPHONE (OUTPATIENT)
Dept: MEDICAL GROUP | Facility: PHYSICIAN GROUP | Age: 68
End: 2018-03-16

## 2018-03-16 DIAGNOSIS — Z13.820 SCREENING FOR OSTEOPOROSIS: ICD-10-CM

## 2018-03-16 NOTE — TELEPHONE ENCOUNTER
Good afternoon Dr. Strong,    Pt is requested an orders for Dexa. Please advise    Thank you,    Stephanie Lynn  Pt outreach

## 2018-03-22 NOTE — TELEPHONE ENCOUNTER
Please notify the patient that I have ordered a bone density scan.  Please let the patient know that I do not find any diagnosis of osteoporosis in her chart, therefore have ordered this under the diagnosis of screening at patient request.    Medicare frequently will not cover a DEXA scan for screening. If she feels she has a medical diagnoses that may qualify for coverage under Medicare for a bone density test we will be happy to place an order with that associated diagnosis, however, make sure she understands that under the present diagnosis she may be responsible for payment.  If she has additional questions she can discuss this with me at the OV.    Patient has not been seen in my office 9/16 and I do not find any labs since that time.    I would recommend labs prior to the OV scheduled 4/12/18, if she opts to have those done let me know and I will reorder.

## 2018-04-03 ENCOUNTER — HOSPITAL ENCOUNTER (OUTPATIENT)
Dept: RADIOLOGY | Facility: MEDICAL CENTER | Age: 68
End: 2018-04-03
Attending: INTERNAL MEDICINE
Payer: COMMERCIAL

## 2018-04-03 ENCOUNTER — APPOINTMENT (OUTPATIENT)
Dept: RADIOLOGY | Facility: MEDICAL CENTER | Age: 68
End: 2018-04-03
Payer: COMMERCIAL

## 2018-04-03 DIAGNOSIS — Z12.31 VISIT FOR SCREENING MAMMOGRAM: ICD-10-CM

## 2018-04-03 PROCEDURE — 77067 SCR MAMMO BI INCL CAD: CPT

## 2018-04-04 ENCOUNTER — HOSPITAL ENCOUNTER (OUTPATIENT)
Dept: RADIOLOGY | Facility: MEDICAL CENTER | Age: 68
End: 2018-04-04

## 2018-04-12 ENCOUNTER — OFFICE VISIT (OUTPATIENT)
Dept: MEDICAL GROUP | Facility: PHYSICIAN GROUP | Age: 68
End: 2018-04-12
Payer: COMMERCIAL

## 2018-04-12 VITALS
BODY MASS INDEX: 29.25 KG/M2 | HEART RATE: 76 BPM | HEIGHT: 60 IN | DIASTOLIC BLOOD PRESSURE: 86 MMHG | OXYGEN SATURATION: 98 % | WEIGHT: 149 LBS | TEMPERATURE: 97.5 F | RESPIRATION RATE: 16 BRPM | SYSTOLIC BLOOD PRESSURE: 142 MMHG

## 2018-04-12 DIAGNOSIS — J30.89 CHRONIC NONSEASONAL ALLERGIC RHINITIS DUE TO POLLEN: ICD-10-CM

## 2018-04-12 DIAGNOSIS — F41.9 ANXIETY: ICD-10-CM

## 2018-04-12 DIAGNOSIS — E66.9 OBESITY (BMI 30-39.9): ICD-10-CM

## 2018-04-12 DIAGNOSIS — I47.10 PSVT (PAROXYSMAL SUPRAVENTRICULAR TACHYCARDIA): ICD-10-CM

## 2018-04-12 PROBLEM — I34.1 MVP (MITRAL VALVE PROLAPSE): Status: ACTIVE | Noted: 2018-04-12

## 2018-04-12 PROCEDURE — 99214 OFFICE O/P EST MOD 30 MIN: CPT | Performed by: INTERNAL MEDICINE

## 2018-04-12 NOTE — ASSESSMENT & PLAN NOTE
Patient states stress is much improved.  She is not as tearful, she feels generally well.  Continues off medications.

## 2018-04-12 NOTE — PROGRESS NOTES
Chief Complaint   Patient presents with   • Paroxysmal Atrial Tachycardia (PAT)     [facility]       HISTORY OF PRESENT ILLNESS: Patient is a 67 y.o. female established patient who presents today to discuss the medical issues below.    PSVT (paroxysmal supraventricular tachycardia) (CMS-HCC)  Patient reports a history of MVP, more recently an episode of palpitations, persisted for several hours, went to the ER at [place], work up included ekg, labs with thyroid.  She has been avoiding theobromine etc, she does recall using Claritin D prior to the most recent event.  Resolved at ER dx with PSVT, resolved with hydration. Workup done in the past with cardiology in 2007 c/w psvt, has managed sans medications.  Patient did have a little episode about 6 weeks prior but had been drinking some caffeine.      Anxiety  Patient states stress is much improved.  She is not as tearful, she feels generally well.  Continues off medications.     Obesity (BMI 30-39.9)  Weight continues a struggle.        Patient Active Problem List    Diagnosis Date Noted   • PSVT (paroxysmal supraventricular tachycardia) (CMS-HCC) 04/12/2018   • Anxiety 08/28/2017   • Obesity (BMI 30-39.9) 08/28/2017   • Palpitations 08/07/2017   • Blocked eustachian tube 12/30/2016   • Pulmonary nodule 06/28/2016   • Moderate persistent asthma without complication 10/28/2014       Allergies:Terramycin [oxytetracycline]    Current Outpatient Prescriptions   Medication Sig Dispense Refill   • verapamil SR (CALAN-SR) 120 MG CR tablet Take 1 Tab by mouth every day. 30 Tab 3   • beclomethasone (QVAR) 40 MCG/ACT inhaler Inhale 1 Puff by mouth 2 Times a Day. 1 Inhaler 5   • montelukast (SINGULAIR) 10 MG Tab TAKE 1 TABLET BY MOUTH EVERY DAY 90 Tab 3   • PROAIR  (90 BASE) MCG/ACT Aero Soln inhalation aerosol INHALE 2 PUFFS BY MOUTH EVERY 6 HOURS AS NEEDED 8.5 g 3   • predniSONE (DELTASONE) 10 MG Tab 2 tabs BID x 2 days, 3 tabs daily x 2 days, 2 tabs daily x 2 days, 1  "tab daily x 2 days 20 Tab 0   • benzonatate (TESSALON) 200 MG capsule Take 1 Cap by mouth 3 times a day as needed for Cough. 30 Cap 0     No current facility-administered medications for this visit.          Past Medical History:   Diagnosis Date   • ASTHMA    • Chronic fatigue 2016   • Degenerative arthritis of spine    • Mild intermittent asthma with acute exacerbation 2015   • Post-menopause on HRT (hormone replacement therapy)    • Pulmonary nodule 2016    Pulmonary, stable CT 8/15   • Rheumatic fever    • Weight gain 2016       Social History   Substance Use Topics   • Smoking status: Never Smoker   • Smokeless tobacco: Never Used   • Alcohol use 1.2 oz/week     2 Glasses of wine per week      Comment: occ wine       Family Status   Relation Status   • Mother    • Father    • Child Alive   • Neg Hx      Family History   Problem Relation Age of Onset   • Other Mother      global decline due to age   • Thyroid Child    • Heart Disease Neg Hx        ROS:    Respiratory: Negative for cough, sputum production, shortness of breath or wheezing.    Cardiovascular: Negative for chest pain, palpitations, orthopnea, dyspnea with exertion or edema.   Gastrointestinal: Negative for GI upset, nausea, vomiting, abdominal pain, constipation or diarrhea.   Genitourinary: Negative for dysuria, urgency, hesitancy or frequency.       Exam:    Blood pressure 142/86, pulse 76, temperature 36.4 °C (97.5 °F), resp. rate 16, height 1.511 m (4' 11.5\"), weight 67.6 kg (149 lb), SpO2 98 %.  General:  Well nourished, well developed female in NAD.  HENT: Normocephalic, bilateral TMs are intact, nasal and oral mucosa with no lesions,   Neck: Supple without bruit. Thyroid is not enlarged.  Pulmonary: Clear to ausculation and percussion.  Normal effort. No rales, rhonchi, or wheezing.  Cardiovascular: Regular rate and rhythm, one out of 6 early systolic at the right sternal margin murmur  Abdomen: Normal " bowel sounds soft and nontender no palpable liver spleen bladder mass.  Extremities: No LE edema noted.  Neuro: Grossly nonfocal.  Psych: Alert and oriented to person, place, and time. Appropriate mood and conversation.    Reviewed records including cardiology workup in 2007, ER workup from [place] is not available except for data patient brings with includes normal labs    This dictation was created using voice recognition software. I have made reasonable attempts to correct errors, however, errors of grammar and content may exist.          Assessment/Plan:    1. PSVT (paroxysmal supraventricular tachycardia) (CMS-HCC)  Clinically consistent with PSVT discussed options at length. She wants to hold off on any cardiology referral and reevaluation. Cardiac murmur insignificant. Option for medication trial. Patient reports she has been on Inderal in the past made her very sleepy. Discussed trial of verapamil, especially in place of the blood pressure that is borderline high repetitively here in the office. Will start Calan  mg one tablet by mouth daily monitor symptoms. Reviewed at length cardiac stimulants to be avoided including any decongestant complement of the over-the-counter allergy medications. Monitor clinically    2. Anxiety  Managing very well currently on no antidepressant medications    3. Obesity (BMI 30-39.9)  Working on weight loss    4. Moderate persistent asthma without complication  Doing well with the beclomethasone maintenance, she does feel some tachycardia when she utilizes her pro-air however not to the degree that took her to the emergency room. Good air motion today.    5. Allergic rhinitis  Monitor with nonsedating antihistamines with avoidance of decongestants.    Patient was seen for  25 minutes face to face of which more than 50% of the time was spent in counseling and coordination of care regarding the above problems.

## 2018-04-12 NOTE — ASSESSMENT & PLAN NOTE
Patient reports a history of MVP, more recently an episode of palpitations, persisted for several hours, went to the ER at [place], work up included ekg, labs with thyroid.  She has been avoiding theobromine etc, she does recall using Claritin D prior to the most recent event.  Resolved at ER dx with PSVT, resolved with hydration. Workup done in the past with cardiology in 2007 c/w psvt, has managed sans medications.  Patient did have a little episode about 6 weeks prior but had been drinking some caffeine.

## 2018-05-31 ENCOUNTER — PATIENT MESSAGE (OUTPATIENT)
Dept: MEDICAL GROUP | Facility: PHYSICIAN GROUP | Age: 68
End: 2018-05-31

## 2018-05-31 DIAGNOSIS — R00.2 PALPITATIONS: ICD-10-CM

## 2018-05-31 NOTE — TELEPHONE ENCOUNTER
From: Ezio Mora  To: Noemí DYER M.D.  Sent: 5/31/2018 9:02 AM PDT  Subject: Prescription Question    I have been on 120mg per day of Verapamil ER for several months now and I am experiencing light nausea each day...the heart palpitations are being controlled by the meds perfectly...no problems with the palpitations since I started the meds.... to alleviate the nausea can I cut the pill in half each day so I get half the dosage...or take it every other day?

## 2018-06-07 ENCOUNTER — TELEPHONE (OUTPATIENT)
Dept: MEDICAL GROUP | Facility: PHYSICIAN GROUP | Age: 68
End: 2018-06-07

## 2018-06-07 NOTE — TELEPHONE ENCOUNTER
Regarding: RE:(No subject)  Contact: [phone]  ----- Message from Noemí DYER M.D. sent at 6/6/2018  2:35 PM PDT -----       ----- Message from Ezio Mora to Noemí DYER M.D. sent at 6/6/2018  1:12 PM -----   the pharmacy gave me 60 mg tablets  (2/day) ...so should I just take one of the 60 mg per day for now and see what happens?   ----- Message -----  From: Noemí DYER M.D.  Sent: 6/1/2018  1:31 PM PDT  To: Ezio Moar  Subject: (No subject)  The ER formulation is a time release format that is not recommended to cut in 1/2.  Are you taking the medication with food as that sometimes helps.  The next lower dose is 60 mg of the regular release medication, they also make a 30 mg.  I will send a prescription to the pharmacy for 30 mg and recommend you start with 1 a day in the am of that, if breakthru palpitations then ok to use 2 a day and let us know how it goes.  You should have a bp check occasionally to make sure this is not dropping your bp too low.

## 2018-08-21 ENCOUNTER — TELEPHONE (OUTPATIENT)
Dept: MEDICAL GROUP | Facility: PHYSICIAN GROUP | Age: 68
End: 2018-08-21

## 2018-10-17 ENCOUNTER — TELEPHONE (OUTPATIENT)
Dept: MEDICAL GROUP | Facility: PHYSICIAN GROUP | Age: 68
End: 2018-10-17

## 2018-10-17 NOTE — TELEPHONE ENCOUNTER
1. Caller Name: marzena from [facility]                       Call Back Number: [phone]    2. Message: marzena called and asked if another script coulf be written for the Q-maxime inhaler.  She said that it needs to be written as the Redi-Haler.  Please advise     3. Patient approves office to leave a detailed voicemail/MyChart message: N/A

## 2018-11-01 RX ORDER — MONTELUKAST SODIUM 10 MG/1
TABLET ORAL
Qty: 90 TAB | Refills: 3 | Status: SHIPPED | OUTPATIENT
Start: 2018-11-01 | End: 2020-01-03

## 2018-11-01 NOTE — TELEPHONE ENCOUNTER
Was the patient seen in the last year in this department? Yes    Does patient have an active prescription for medications requested? Yes    Received Request Via: Pharmacy      Last office visit: 04/12/2018

## 2018-11-12 ENCOUNTER — OFFICE VISIT (OUTPATIENT)
Dept: URGENT CARE | Facility: PHYSICIAN GROUP | Age: 68
End: 2018-11-12
Payer: COMMERCIAL

## 2018-11-12 VITALS
TEMPERATURE: 97.7 F | RESPIRATION RATE: 14 BRPM | HEART RATE: 76 BPM | HEIGHT: 60 IN | BODY MASS INDEX: 29.45 KG/M2 | SYSTOLIC BLOOD PRESSURE: 132 MMHG | OXYGEN SATURATION: 98 % | WEIGHT: 150 LBS | DIASTOLIC BLOOD PRESSURE: 88 MMHG
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
## 2018-11-12 DIAGNOSIS — S61.052A DOG BITE OF LEFT THUMB, INITIAL ENCOUNTER: ICD-10-CM

## 2018-11-12 DIAGNOSIS — Z23 NEED FOR TETANUS BOOSTER: ICD-10-CM

## 2018-11-12 PROCEDURE — 99214 OFFICE O/P EST MOD 30 MIN: CPT | Mod: 25 | Performed by: PHYSICIAN ASSISTANT

## 2018-11-12 PROCEDURE — 90715 TDAP VACCINE 7 YRS/> IM: CPT | Performed by: PHYSICIAN ASSISTANT

## 2018-11-12 PROCEDURE — 90471 IMMUNIZATION ADMIN: CPT | Performed by: PHYSICIAN ASSISTANT

## 2018-11-12 RX ORDER — AMOXICILLIN AND CLAVULANATE POTASSIUM 875; 125 MG/1; MG/1
1 TABLET, FILM COATED ORAL 2 TIMES DAILY
Qty: 20 TAB | Refills: 0 | Status: SHIPPED | OUTPATIENT
Start: 2018-11-12 | End: 2018-11-22

## 2018-11-12 NOTE — PROGRESS NOTES
Chief Complaint   Patient presents with   • Dog Bite       HISTORY OF PRESENT ILLNESS: Patient is a 68 y.o. female who presents today because she was bitten in her left thumb by her own bulldog 2 days ago.  She is not up-to-date on her tetanus.  She was only coming in for her tetanus but over the last 24 hours it has gotten more red and tender and swollen.  Denies any distal paresthesias.  She has been using soap and water and Neosporin and a bandage on the area.    Patient Active Problem List    Diagnosis Date Noted   • PSVT (paroxysmal supraventricular tachycardia) ([facility]) 04/12/2018   • Chronic nonseasonal allergic rhinitis due to pollen 04/12/2018   • Anxiety 08/28/2017   • Obesity (BMI 30-39.9) 08/28/2017   • Palpitations 08/07/2017   • Blocked eustachian tube 12/30/2016   • Pulmonary nodule 06/28/2016   • Moderate persistent asthma without complication 10/28/2014       Allergies:Terramycin [oxytetracycline]    Current Outpatient Prescriptions Ordered in [facility]   Medication Sig Dispense Refill   • amoxicillin-clavulanate (AUGMENTIN) 875-125 MG Tab Take 1 Tab by mouth 2 times a day for 10 days. 20 Tab 0   • montelukast (SINGULAIR) 10 MG Tab TAKE 1 TABLET BY MOUTH EVERY DAY 90 Tab 3   • beclomethasone (QVAR) 40 MCG/ACT inhaler Inhale 1 Puff by mouth 2 Times a Day. 1 Inhaler 3   • DILTIAZem (CARDIZEM) 30 MG Tab Take 1 Tab by mouth 2 times a day as needed. 60 Tab 3   • verapamil SR (CALAN-SR) 120 MG CR tablet Take 1 Tab by mouth every day. 30 Tab 3   • predniSONE (DELTASONE) 10 MG Tab 2 tabs BID x 2 days, 3 tabs daily x 2 days, 2 tabs daily x 2 days, 1 tab daily x 2 days 20 Tab 0   • benzonatate (TESSALON) 200 MG capsule Take 1 Cap by mouth 3 times a day as needed for Cough. 30 Cap 0   • PROAIR  (90 BASE) MCG/ACT Aero Soln inhalation aerosol INHALE 2 PUFFS BY MOUTH EVERY 6 HOURS AS NEEDED 8.5 g 3     No current [facility]-ordered facility-administered medications on file.        Past Medical History:   Diagnosis Date  "  • ASTHMA    • Chronic fatigue 2016   • Degenerative arthritis of spine    • Mild intermittent asthma with acute exacerbation 2015   • Post-menopause on HRT (hormone replacement therapy)    • Pulmonary nodule 2016    Pulmonary, stable CT 8/15   • Rheumatic fever    • Weight gain 2016       Social History   Substance Use Topics   • Smoking status: Never Smoker   • Smokeless tobacco: Never Used   • Alcohol use 1.2 oz/week     2 Glasses of wine per week      Comment: occ wine       Family Status   Relation Status   • Mo    • Fa    • Child Alive   • Neg Hx (Not Specified)     Family History   Problem Relation Age of Onset   • Other Mother         global decline due to age   • Thyroid Child    • Heart Disease Neg Hx        ROS:  Review of Systems   Constitutional: Negative for fever, chills, weight loss and malaise/fatigue.   HENT: Negative for ear pain, nosebleeds, congestion, sore throat and neck pain.    Eyes: Negative for blurred vision.   Respiratory: Negative for cough, sputum production, shortness of breath and wheezing.    Cardiovascular: Negative for chest pain, palpitations, orthopnea and leg swelling.   Gastrointestinal: Negative for heartburn, nausea, vomiting and abdominal pain.   Genitourinary: Negative for dysuria, urgency and frequency.     Exam:  Blood pressure 132/88, pulse 76, temperature 36.5 °C (97.7 °F), temperature source Temporal, resp. rate 14, height 1.511 m (4' 11.5\"), weight 68 kg (150 lb), SpO2 98 %, not currently breastfeeding.  General:  Well nourished, well developed female in NAD  Head:Normocephalic, atraumatic  Eyes: PERRLA, EOM within normal limits, no conjunctival injection, no scleral icterus, visual fields and acuity grossly intact.  Extremities: no clubbing, cyanosis, or edema.  Left thumb has puncture wounds on either side of the interphalangeal joint.  The area is red, swollen and tender and she has reduced range of motion secondary to " swelling.    Please note that this dictation was created using voice recognition software. I have made every reasonable attempt to correct obvious errors, but I expect that there are errors of grammar and possibly content that I did not discover before finalizing the note.    Assessment/Plan:  1. Dog bite of left thumb, initial encounter  amoxicillin-clavulanate (AUGMENTIN) 875-125 MG Tab   2. Need for tetanus booster  Tdap =>8yo IM   Wound care instructions given.    Followup with primary care in the next 7-10 days if not significantly improving, return to the urgent care or go to the emergency room sooner for any worsening of symptoms.

## 2018-11-19 DIAGNOSIS — J45.20 MILD INTERMITTENT ASTHMA WITHOUT COMPLICATION: ICD-10-CM

## 2018-11-20 NOTE — TELEPHONE ENCOUNTER
Dr. Strong, Patient is requesting a refill for Serevent. This was DC'd 1/18. Please refill as you see fit.

## 2018-11-20 NOTE — TELEPHONE ENCOUNTER
Was the patient seen in the last year in this department? Yes    Does patient have an active prescription for medications requested? No     Received Request Via: Patient      Pt met protocol?: Yes, last ov 11/12/18   medication was dc'd on 1/24/18

## 2019-03-07 RX ORDER — ALBUTEROL SULFATE 90 UG/1
2 AEROSOL, METERED RESPIRATORY (INHALATION) EVERY 6 HOURS PRN
Qty: 8.5 G | Refills: 3 | Status: SHIPPED | OUTPATIENT
Start: 2019-03-07

## 2019-03-07 NOTE — TELEPHONE ENCOUNTER
Was the patient seen in the last year in this department? Yes    Does patient have an active prescription for medications requested? No     Received Request Via: Pharmacy     Last OV 04/12/18  Labs

## 2019-03-14 ENCOUNTER — TELEPHONE (OUTPATIENT)
Dept: MEDICAL GROUP | Facility: PHYSICIAN GROUP | Age: 69
End: 2019-03-14

## 2019-03-14 NOTE — TELEPHONE ENCOUNTER
DOCUMENTATION OF PAR STATUS:    1. Name of Medication & Dose: Albuterol 180/90     2. Name of Prescription Coverage Company & phone #: [employer]    3. Date Prior Auth Submitted: 3/14/19    4. What information was given to obtain insurance decision? ICD-10     5. Prior Auth Status? Pending    6. Patient Notified: yes

## 2019-08-14 ENCOUNTER — OFFICE VISIT (OUTPATIENT)
Dept: MEDICAL GROUP | Facility: PHYSICIAN GROUP | Age: 69
End: 2019-08-14
Payer: MEDICARE

## 2019-08-14 VITALS
RESPIRATION RATE: 14 BRPM | DIASTOLIC BLOOD PRESSURE: 90 MMHG | HEIGHT: 61 IN | BODY MASS INDEX: 27.56 KG/M2 | SYSTOLIC BLOOD PRESSURE: 150 MMHG | WEIGHT: 146 LBS | HEART RATE: 74 BPM | TEMPERATURE: 99.1 F | OXYGEN SATURATION: 95 %

## 2019-08-14 DIAGNOSIS — E78.5 HYPERLIPIDEMIA, UNSPECIFIED HYPERLIPIDEMIA TYPE: ICD-10-CM

## 2019-08-14 DIAGNOSIS — Z00.00 ENCOUNTER FOR MEDICARE ANNUAL WELLNESS EXAM: ICD-10-CM

## 2019-08-14 DIAGNOSIS — Z12.39 BREAST CANCER SCREENING: ICD-10-CM

## 2019-08-14 DIAGNOSIS — J45.40 MODERATE PERSISTENT ASTHMA WITHOUT COMPLICATION: ICD-10-CM

## 2019-08-14 DIAGNOSIS — J30.89 CHRONIC NONSEASONAL ALLERGIC RHINITIS DUE TO POLLEN: ICD-10-CM

## 2019-08-14 DIAGNOSIS — I47.10 PSVT (PAROXYSMAL SUPRAVENTRICULAR TACHYCARDIA): ICD-10-CM

## 2019-08-14 PROCEDURE — 99214 OFFICE O/P EST MOD 30 MIN: CPT | Performed by: INTERNAL MEDICINE

## 2019-08-14 ASSESSMENT — PATIENT HEALTH QUESTIONNAIRE - PHQ9
SUM OF ALL RESPONSES TO PHQ QUESTIONS 1-9: 7
CLINICAL INTERPRETATION OF PHQ2 SCORE: 1
5. POOR APPETITE OR OVEREATING: 2 - MORE THAN HALF THE DAYS

## 2019-08-14 ASSESSMENT — ENCOUNTER SYMPTOMS: GENERAL WELL-BEING: GOOD

## 2019-08-14 ASSESSMENT — ACTIVITIES OF DAILY LIVING (ADL): BATHING_REQUIRES_ASSISTANCE: 0

## 2019-08-14 NOTE — PROGRESS NOTES
Chief Complaint   Patient presents with   • Annual Wellness Visit     annual          HPI:  Ezio Mora is a 68 y.o. here for Medicare Annual Wellness Visit     Patient Active Problem List    Diagnosis Date Noted   • PSVT (paroxysmal supraventricular tachycardia) ([facility]) 04/12/2018   • Chronic nonseasonal allergic rhinitis due to pollen 04/12/2018   • Anxiety 08/28/2017   • Obesity (BMI 30-39.9) 08/28/2017   • Palpitations 08/07/2017   • Blocked eustachian tube 12/30/2016   • Pulmonary nodule 06/28/2016   • Moderate persistent asthma without complication 10/28/2014       Current Outpatient Medications   Medication Sig Dispense Refill   • DILTIAZem (CARDIZEM) 30 MG Tab TAKE 1 TABLET BY MOUTH TWICE DAILY 180 Tab 1   • albuterol (PROAIR HFA) 108 (90 Base) MCG/ACT Aero Soln inhalation aerosol Inhale 2 Puffs by mouth every 6 hours as needed. 8.5 g 3   • SEREVENT DISKUS 50 MCG/DOSE AEROSOL POWDER, BREATH ACTIVATED INHALE 1 PUFF BY MOUTH TWICE DAILY 1 Each 5   • montelukast (SINGULAIR) 10 MG Tab TAKE 1 TABLET BY MOUTH EVERY DAY 90 Tab 3   • beclomethasone (QVAR) 40 MCG/ACT inhaler Inhale 1 Puff by mouth 2 Times a Day. 1 Inhaler 3   • verapamil SR (CALAN-SR) 120 MG CR tablet Take 1 Tab by mouth every day. (Patient not taking: Reported on 8/14/2019) 30 Tab 3   • predniSONE (DELTASONE) 10 MG Tab 2 tabs BID x 2 days, 3 tabs daily x 2 days, 2 tabs daily x 2 days, 1 tab daily x 2 days (Patient not taking: Reported on 8/14/2019) 20 Tab 0   • benzonatate (TESSALON) 200 MG capsule Take 1 Cap by mouth 3 times a day as needed for Cough. (Patient not taking: Reported on 8/14/2019) 30 Cap 0     No current facility-administered medications for this visit.             Current supplements as per medication list.       Allergies: Terramycin [oxytetracycline]    Current social contact/activities: None     She  reports that she has never smoked. She has never used smokeless tobacco. She reports that she drinks about 1.2 oz of alcohol per  week. She reports that she does not use drugs.  Counseling given: Not Answered      DPA/Advanced Directive:  Patient does not have an Advanced Directive.  A packet and workshop information was given on Advanced Directives.    ROS:    Gait: Uses no assistive device  Ostomy: No  Other tubes: No  Amputations: No  Chronic oxygen use: No  Last eye exam: 2017  Wears hearing aids: No   : Denies any urinary leakage during the last 6 months    Screening:    Depression Screening    Little interest or pleasure in doing things?  0 - not at all  Feeling down, depressed , or hopeless? 1 - several days  Trouble falling or staying asleep, or sleeping too much?  2 - more than half the days  Feeling tired or having little energy?  2 - more than half the days  Poor appetite or overeating?  2 - more than half the days  Feeling bad about yourself - or that you are a failure or have let yourself or your family down? 0 - not at all  Trouble concentrating on things, such as reading the newspaper or watching television? 0 - not at all  Moving or speaking so slowly that other people could have noticed.  Or the opposite - being so fidgety or restless that you have been moving around a lot more than usual?  0 - not at all  Thoughts that you would be better off dead, or of hurting yourself?  0 - not at all  Patient Health Questionnaire Score: 7    If depressive symptoms identified deferred to follow up visit unless specifically addressed in assessment and plan.    Interpretation of PHQ-9 Total Score   Score Severity   1-4 No Depression   5-9 Mild Depression   10-14 Moderate Depression   15-19 Moderately Severe Depression   20-27 Severe Depression      Screening for Cognitive Impairment    Three Minute Recall (village, kitchen, baby)  /3    Vincent clock face with all 12 numbers and set the hands to show 10 past 10.       Cognitive concerns identified deferred for follow up unless specifically addressed in assessment and plan.    Fall Risk  Assessment    Has the patient had two or more falls in the last year or any fall with injury in the last year?       Safety Assessment    Throw rugs on floor.  No  Handrails on all stairs.  Yes  Good lighting in all hallways.  Yes  Difficulty hearing.  No  Patient counseled about all safety risks that were identified.    Functional Assessment ADLs    Are there any barriers preventing you from cooking for yourself or meeting nutritional needs?  No.    Are there any barriers preventing you from driving safely or obtaining transportation?  No.    Are there any barriers preventing you from using a telephone or calling for help?  No.    Are there any barriers preventing you from shopping?  No.    Are there any barriers preventing you from taking care of your own finances?  No.    Are there any barriers preventing you from managing your medications?  No.    Are there any barriers preventing you from showering, bathing or dressing yourself? No.    Are you currently engaging in any exercise or physical activity?  Yes.     What is your perception of your health?   .      Health Maintenance Summary                HEPATITIS C SCREENING Overdue 1950     IMM ZOSTER VACCINES Overdue 10/20/2001     BONE DENSITY Overdue 10/20/2016     IMM PNEUMOCOCCAL VACCINE: 65+ Years Overdue 10/20/2016     PAP SMEAR Overdue 5/28/2017      Done 5/28/2014 [facility]    MAMMOGRAM Overdue 4/3/2019      Done 4/3/2018 MA-MAMMO SCREENING BILAT W/IMPLANTS W/SAMANTA W/CAD     Patient has more history with this topic...    IMM INFLUENZA Next Due 9/1/2019      Done 10/11/2018 Ext Imm: Influenza, High Dose     Patient has more history with this topic...    COLONOSCOPY Next Due 10/28/2019      Done 10/28/2009 [facility]    IMM DTaP/Tdap/Td Vaccine Next Due 11/12/2028      Done 11/12/2018 Imm Admin: Tdap Vaccine     Patient has more history with this topic...          Patient Care Team:  Noemí DYER M.D. as PCP - General ([facility]  "Medicine)      Social History     Tobacco Use   • Smoking status: Never Smoker   • Smokeless tobacco: Never Used   Substance Use Topics   • Alcohol use: Yes     Alcohol/week: 1.2 oz     Types: 2 Glasses of wine per week     Comment: occ wine   • Drug use: No     Family History   Problem Relation Age of Onset   • Other Mother         global decline due to age   • Thyroid Child    • Heart Disease Neg Hx      She  has a past medical history of ASTHMA, Chronic fatigue (9/28/2016), Degenerative arthritis of spine, Mild intermittent asthma with acute exacerbation (6/18/2015), Post-menopause on HRT (hormone replacement therapy), Pulmonary nodule (6/28/2016), Rheumatic fever, and Weight gain (6/28/2016).   Past Surgical History:   Procedure Laterality Date   • LUMBAR FUSION POSTERIOR     • PRIMARY C SECTION     • TUBAL LIGATION         Exam:   Ht 1.537 m (5' 0.5\")   Wt 66.2 kg (146 lb)  Body mass index is 28.04 kg/m².      Exam:  /90 (BP Location: Left arm, Patient Position: Sitting, BP Cuff Size: Small adult)   Pulse 74   Temp 37.3 °C (99.1 °F) (Temporal)   Resp 14   Ht 1.537 m (5' 0.5\")   Wt 66.2 kg (146 lb)   SpO2 95%   General:  Well nourished, well developed female in NAD.  Pulmonary: Clear to ausculation and percussion.  Normal effort. No rales, ronchi, or wheezing.  Cardiovascular: Regular rate and rhythm without murmur.   Abdomen: Normal bowel sounds soft and nontender no palpable liver spleen bladder mass.  Extremities: No LE edema noted.  Neuro: Grossly nonfocal.  Psych: Alert and oriented to person, place, and time. Appropriate mood and conversation.          Assessment and Plan. The following treatment and monitoring plan is recommended:    Moderate persistent asthma without complication  Reports doing well rare albuterol continues with her serevent and qvar.      Chronic nonseasonal allergic rhinitis due to pollen  No complaints, continues with the singulair.      PSVT (paroxysmal supraventricular " tachycardia) (HCC)  Rare palpitations with fatigue, resolve with rest.  Continues on her diltiazem.     HCM: schedule mammogram and labs.      Fatigue: Patient reports she is very fatigued.   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  She has been working to stabilize him since April of this year.  Currently he is doing much better.  Up until now she has been up changing the ileostomy pouch every 3-4 hours.  Discussed stress management support systems need for rest and regular exercise.  Monitor with early follow-up.  Check labs.      Services suggested: No services needed at this time  Health Care Screening: Age-appropriate preventive services recommended by USPTF and ACIP covered by Medicare were discussed today. Services ordered if indicated and agreed upon by the patient.  Referrals offered: Community-based lifestyle interventions to reduce health risks and promote self-management and wellness, fall prevention, nutrition, physical activity, tobacco-use cessation, weight loss, and mental health services as per orders if indicated.    Discussion today about general wellness and lifestyle habits:    · Prevent falls and reduce trip hazards; Cautioned about securing or removing rugs.  · Have a working fire alarm and carbon monoxide detector;   · Engage in regular physical activity and social activities     Follow-up: No follow-ups on file.

## 2019-08-27 ENCOUNTER — TELEPHONE (OUTPATIENT)
Dept: MEDICAL GROUP | Facility: PHYSICIAN GROUP | Age: 69
End: 2019-08-27

## 2019-08-27 NOTE — TELEPHONE ENCOUNTER
FINAL PRIOR AUTHORIZATION STATUS:    1.  Name of Medication & Dose: Qvar redihaler     2. Prior Auth Status: Denied.  Reason: pt has to first try Arnuity Ellipta    3. Action Taken: Pharmacy Notified: N\A Patient Notified: yes    PA was denied because the patient needs to try Arnuity Ellipta first.  Denial is scanned into media for your review.  Please advise

## 2019-09-05 NOTE — TELEPHONE ENCOUNTER
1. Caller Name: Pt                      Call Back Number: [phone] (home)     2. Message: Pt called in stating that her insurance will not fill medication as it is a one time of day use not twice. Pharmacy states a new Rx needs to be sent in. Please advise.    3. Patient approves office to leave a detailed voicemail/MyChart message: no

## 2019-09-09 ENCOUNTER — TELEPHONE (OUTPATIENT)
Dept: MEDICAL GROUP | Facility: PHYSICIAN GROUP | Age: 69
End: 2019-09-09

## 2019-09-09 NOTE — TELEPHONE ENCOUNTER
We wrote this as 2 (100 mcg) puffs daily (not BID) and 200 mcg is a standard high dose protocol.    I do not understand their problem?   Please confirm problem with the insurance co?

## 2019-09-09 NOTE — TELEPHONE ENCOUNTER
Catrina Zuluaga faxed stating that the Arnuity Ellipta is only supposed to be dosed once daily. Tri would like this to be changed to Flovent 100mcg Diskus 1 puff po q 12hr? Please advise. Thanks

## 2019-11-18 ENCOUNTER — HOSPITAL ENCOUNTER (OUTPATIENT)
Dept: LAB | Facility: MEDICAL CENTER | Age: 69
End: 2019-11-18
Attending: INTERNAL MEDICINE
Payer: MEDICARE

## 2019-11-18 DIAGNOSIS — I47.10 PSVT (PAROXYSMAL SUPRAVENTRICULAR TACHYCARDIA): ICD-10-CM

## 2019-11-18 DIAGNOSIS — E78.5 HYPERLIPIDEMIA, UNSPECIFIED HYPERLIPIDEMIA TYPE: ICD-10-CM

## 2019-11-18 LAB
ALBUMIN SERPL BCP-MCNC: 4.6 G/DL (ref 3.2–4.9)
ALBUMIN/GLOB SERPL: 1.8 G/DL
ALP SERPL-CCNC: 66 U/L (ref 30–99)
ALT SERPL-CCNC: 25 U/L (ref 2–50)
ANION GAP SERPL CALC-SCNC: 8 MMOL/L (ref 0–11.9)
AST SERPL-CCNC: 21 U/L (ref 12–45)
BASOPHILS # BLD AUTO: 0.8 % (ref 0–1.8)
BASOPHILS # BLD: 0.05 K/UL (ref 0–0.12)
BILIRUB SERPL-MCNC: 0.4 MG/DL (ref 0.1–1.5)
BUN SERPL-MCNC: 20 MG/DL (ref 8–22)
CALCIUM SERPL-MCNC: 9.2 MG/DL (ref 8.5–10.5)
CHLORIDE SERPL-SCNC: 106 MMOL/L (ref 96–112)
CHOLEST SERPL-MCNC: 245 MG/DL (ref 100–199)
CO2 SERPL-SCNC: 30 MMOL/L (ref 20–33)
CREAT SERPL-MCNC: 0.75 MG/DL (ref 0.5–1.4)
EOSINOPHIL # BLD AUTO: 0.92 K/UL (ref 0–0.51)
EOSINOPHIL NFR BLD: 15.2 % (ref 0–6.9)
ERYTHROCYTE [DISTWIDTH] IN BLOOD BY AUTOMATED COUNT: 45.4 FL (ref 35.9–50)
FASTING STATUS PATIENT QL REPORTED: NORMAL
GLOBULIN SER CALC-MCNC: 2.6 G/DL (ref 1.9–3.5)
GLUCOSE SERPL-MCNC: 87 MG/DL (ref 65–99)
HCT VFR BLD AUTO: 45.6 % (ref 37–47)
HDLC SERPL-MCNC: 91 MG/DL
HGB BLD-MCNC: 14.6 G/DL (ref 12–16)
IMM GRANULOCYTES # BLD AUTO: 0.04 K/UL (ref 0–0.11)
IMM GRANULOCYTES NFR BLD AUTO: 0.7 % (ref 0–0.9)
LDLC SERPL CALC-MCNC: 143 MG/DL
LYMPHOCYTES # BLD AUTO: 1.65 K/UL (ref 1–4.8)
LYMPHOCYTES NFR BLD: 27.3 % (ref 22–41)
MCH RBC QN AUTO: 31.3 PG (ref 27–33)
MCHC RBC AUTO-ENTMCNC: 32 G/DL (ref 33.6–35)
MCV RBC AUTO: 97.9 FL (ref 81.4–97.8)
MONOCYTES # BLD AUTO: 0.48 K/UL (ref 0–0.85)
MONOCYTES NFR BLD AUTO: 7.9 % (ref 0–13.4)
NEUTROPHILS # BLD AUTO: 2.91 K/UL (ref 2–7.15)
NEUTROPHILS NFR BLD: 48.1 % (ref 44–72)
NRBC # BLD AUTO: 0 K/UL
NRBC BLD-RTO: 0 /100 WBC
PLATELET # BLD AUTO: 315 K/UL (ref 164–446)
PMV BLD AUTO: 9.6 FL (ref 9–12.9)
POTASSIUM SERPL-SCNC: 4.2 MMOL/L (ref 3.6–5.5)
PROT SERPL-MCNC: 7.2 G/DL (ref 6–8.2)
RBC # BLD AUTO: 4.66 M/UL (ref 4.2–5.4)
SODIUM SERPL-SCNC: 144 MMOL/L (ref 135–145)
TRIGL SERPL-MCNC: 53 MG/DL (ref 0–149)
TSH SERPL DL<=0.005 MIU/L-ACNC: 2.22 UIU/ML (ref 0.38–5.33)
WBC # BLD AUTO: 6.1 K/UL (ref 4.8–10.8)

## 2019-11-18 PROCEDURE — 80053 COMPREHEN METABOLIC PANEL: CPT

## 2019-11-18 PROCEDURE — 36415 COLL VENOUS BLD VENIPUNCTURE: CPT

## 2019-11-18 PROCEDURE — 84443 ASSAY THYROID STIM HORMONE: CPT

## 2019-11-18 PROCEDURE — 85025 COMPLETE CBC W/AUTO DIFF WBC: CPT

## 2019-11-18 PROCEDURE — 80061 LIPID PANEL: CPT

## 2019-11-25 ENCOUNTER — OFFICE VISIT (OUTPATIENT)
Dept: MEDICAL GROUP | Facility: PHYSICIAN GROUP | Age: 69
End: 2019-11-25
Payer: MEDICARE

## 2019-11-25 VITALS
DIASTOLIC BLOOD PRESSURE: 60 MMHG | HEART RATE: 77 BPM | TEMPERATURE: 97.8 F | WEIGHT: 151 LBS | OXYGEN SATURATION: 97 % | BODY MASS INDEX: 28.51 KG/M2 | RESPIRATION RATE: 12 BRPM | HEIGHT: 61 IN | SYSTOLIC BLOOD PRESSURE: 124 MMHG

## 2019-11-25 DIAGNOSIS — M25.551 RIGHT HIP PAIN: ICD-10-CM

## 2019-11-25 DIAGNOSIS — J41.0 SIMPLE CHRONIC BRONCHITIS (HCC): ICD-10-CM

## 2019-11-25 DIAGNOSIS — R53.82 CHRONIC FATIGUE: ICD-10-CM

## 2019-11-25 DIAGNOSIS — R53.83 FATIGUE, UNSPECIFIED TYPE: ICD-10-CM

## 2019-11-25 DIAGNOSIS — J45.40 MODERATE PERSISTENT ASTHMA WITHOUT COMPLICATION: ICD-10-CM

## 2019-11-25 DIAGNOSIS — E66.9 OBESITY (BMI 30-39.9): ICD-10-CM

## 2019-11-25 DIAGNOSIS — I47.10 PSVT (PAROXYSMAL SUPRAVENTRICULAR TACHYCARDIA): ICD-10-CM

## 2019-11-25 PROCEDURE — 99214 OFFICE O/P EST MOD 30 MIN: CPT | Performed by: INTERNAL MEDICINE

## 2019-11-25 RX ORDER — DICLOFENAC SODIUM 75 MG/1
75 TABLET, DELAYED RELEASE ORAL 2 TIMES DAILY
Qty: 60 TAB | Refills: 3 | Status: SHIPPED | OUTPATIENT
Start: 2019-11-25 | End: 2022-01-31

## 2019-11-25 NOTE — ASSESSMENT & PLAN NOTE
Patient complains primarily of ongoing fatigue.  She just feels periodically so tired she just has to go take a nap.  She awakens in the morning a bit rested but then very rapidly she estimates within an hour, she is fatigued and would like to go back to bed.  She still is under a great deal of stress with her  she gets up at night to help him with his ileostomy bag.  She does not identify as depressed citing that her 's response to the chemotherapy has been so excellent and an additional surgery is not indicated that she feels things are going well.  She is exercising on a regular basis she feels she sleeps fairly well except for the interruption with her .

## 2019-11-25 NOTE — ASSESSMENT & PLAN NOTE
Patient complaining of 2 mos of right lateral hip pain.  She cannot recall any trauma, improves with motrin.  Sometimes radiates into the right upper leg.  No weakness.

## 2019-11-25 NOTE — PROGRESS NOTES
Chief Complaint   Patient presents with   • Lab Results       HISTORY OF PRESENT ILLNESS: Patient is a 69 y.o. female established patient who presents today to discuss the medical issues below.    Right hip pain  Patient complaining of 2 mos of right lateral hip pain.  She cannot recall any trauma, improves with motrin.  Sometimes radiates into the right upper leg.  No weakness.      PSVT (paroxysmal supraventricular tachycardia) (HCC)  Patient reports that taking the diltiazem bid and has had no additional problems.    Moderate persistent asthma without complication  Patient doing well with the Anuity, singulair,     Chronic fatigue  Patient complains primarily of ongoing fatigue.  She just feels periodically so tired she just has to go take a nap.  She awakens in the morning a bit rested but then very rapidly she estimates within an hour, she is fatigued and would like to go back to bed.  She still is under a great deal of stress with her  she gets up at night to help him with his ileostomy bag.  She does not identify as depressed citing that her 's response to the chemotherapy has been so excellent and an additional surgery is not indicated that she feels things are going well.  She is exercising on a regular basis she feels she sleeps fairly well except for the interruption with her .    Obesity (BMI 30-39.9)  Weight remaining stable.  She would like to lose some weight.      Patient Active Problem List    Diagnosis Date Noted   • Right hip pain 11/25/2019   • PSVT (paroxysmal supraventricular tachycardia) (HCC) 04/12/2018   • Chronic nonseasonal allergic rhinitis due to pollen 04/12/2018   • Anxiety 08/28/2017   • Obesity (BMI 30-39.9) 08/28/2017   • Palpitations 08/07/2017   • Blocked eustachian tube 12/30/2016   • Chronic fatigue 09/28/2016   • Pulmonary nodule 06/28/2016   • Moderate persistent asthma without complication 10/28/2014       Allergies:Terramycin  [oxytetracycline]    Current Outpatient Medications   Medication Sig Dispense Refill   • Fluticasone Furoate (ARNUITY ELLIPTA) 100 MCG/ACT AEROSOL POWDER, BREATH ACTIVATED Inhale 1 Inhalation by mouth every day. 90 Each 3   • diclofenac EC (VOLTAREN) 75 MG Tablet Delayed Response Take 1 Tab by mouth 2 times a day. 60 Tab 3   • DILTIAZem (CARDIZEM) 30 MG Tab TAKE 1 TABLET BY MOUTH TWICE DAILY 180 Tab 1   • albuterol (PROAIR HFA) 108 (90 Base) MCG/ACT Aero Soln inhalation aerosol Inhale 2 Puffs by mouth every 6 hours as needed. 8.5 g 3   • SEREVENT DISKUS 50 MCG/DOSE AEROSOL POWDER, BREATH ACTIVATED INHALE 1 PUFF BY MOUTH TWICE DAILY 1 Each 5   • montelukast (SINGULAIR) 10 MG Tab TAKE 1 TABLET BY MOUTH EVERY DAY 90 Tab 3     No current facility-administered medications for this visit.          Past Medical History:   Diagnosis Date   • ASTHMA    • Chronic fatigue 2016   • Degenerative arthritis of spine    • Mild intermittent asthma with acute exacerbation 2015   • Post-menopause on HRT (hormone replacement therapy)    • Pulmonary nodule 2016    Pulmonary, stable CT 8/15   • Rheumatic fever    • Weight gain 2016       Social History     Tobacco Use   • Smoking status: Never Smoker   • Smokeless tobacco: Never Used   Substance Use Topics   • Alcohol use: Yes     Alcohol/week: 1.2 oz     Types: 2 Glasses of wine per week     Comment: occ wine   • Drug use: No       Family Status   Relation Name Status   • Mo     • Fa unknown    • Child  Alive   • Neg Hx  (Not Specified)     Family History   Problem Relation Age of Onset   • Other Mother         global decline due to age   • Thyroid Child    • Heart Disease Neg Hx        ROS:    Respiratory: Negative for cough, sputum production, shortness of breath or wheezing.    Cardiovascular: Negative for chest pain, palpitations, orthopnea, dyspnea with exertion or edema.   Gastrointestinal: Negative for GI upset, nausea, vomiting, abdominal  "pain, constipation or diarrhea.   Genitourinary: Negative for dysuria, urgency, hesitancy or frequency.       Exam:    /60 (BP Location: Left arm, Patient Position: Sitting, BP Cuff Size: Adult)   Pulse 77   Temp 36.6 °C (97.8 °F)   Resp 12   Ht 1.537 m (5' 0.5\")   Wt 68.5 kg (151 lb)   SpO2 97%   General:  Well nourished, well developed female in NAD.  Pulmonary: Clear to ausculation and percussion.  Normal effort. No rales, rhonchi, or wheezing.  Cardiovascular: Regular rate and rhythm without murmur.   Abdomen: Normal bowel sounds soft and nontender no palpable liver spleen bladder mass.  Extremities: No LE edema noted.  She has some point tenderness of the right trochanteric bursa.  There is no hip pain with external rotation.  No back or spine tenderness negative straight leg testing.  Neuro: Grossly nonfocal.  Psych: Alert and oriented to person, place, and time. Appropriate mood and conversation.    LABS: Results reviewed and discussed with the patient, questions answered.      This dictation was created using voice recognition software. I have made reasonable attempts to correct errors, however, errors of grammar and content may exist.          Assessment/Plan:    1. Right hip pain  More consistent with a trochanteric bursitis.  Monitor with trial diclofenac referral for Ortho for consideration of injection if persisting.  - REFERRAL TO ORTHOPEDICS    2. PSVT (paroxysmal supraventricular tachycardia) (HCC)  Stable on medications    3. Moderate persistent asthma without complication  Patient with significant fatigue cannot entirely exclude sleep apnea with her asthma.  We will check a overnight oximetry  - REFERRAL TO NOCTURNAL OXIMETRY STUDY    4. Fatigue, unspecified type  Long discussion held regarding fatigue.  Few clues on her history.  Physical exam unremarkable.  Sleep apnea on the differential, anxiety and component of depression remains on differential.  Support monitor.  - REFERRAL TO " NOCTURNAL OXIMETRY STUDY  - REFERRAL TO BEHAVIORAL HEALTH    5. Chronic fatigue  Patient does become tearful in the office as we discussed some of her worries about long-term care for her  and herself.  May certainly be contributing to her chronic fatigue factor as well as recovery from the last year of chemotherapy.  Encouraged.  Consideration for counseling    6. Obesity (BMI 30-39.9)  At baseline continue to monitor for now addressing above issues.       Patient was seen for 25 minutes face to face of which more than 50% of the time was spent in counseling and coordination of care regarding the above problems.    Clarification addendum:   Patient with underlying chronic COPD J 41.0  contributes to night time hypoxia.

## 2020-01-07 ENCOUNTER — TELEPHONE (OUTPATIENT)
Dept: MEDICAL GROUP | Facility: PHYSICIAN GROUP | Age: 70
End: 2020-01-07

## 2020-01-07 DIAGNOSIS — G47.34 NOCTURNAL HYPOXIA: ICD-10-CM

## 2020-01-07 DIAGNOSIS — J96.11 CHRONIC HYPOXEMIC RESPIRATORY FAILURE (HCC): ICD-10-CM

## 2020-01-07 NOTE — TELEPHONE ENCOUNTER
I have placed pulmonary consult.  However, I don't see any communication about the oxygen being declined by her insurance co?   She should qualify for night oxygen based on her over nite oxymetry.  Please check with [facility] if they know about this?     I do not even see that we ordered night oxygen? I placed an order today if they just didn't get an order.

## 2020-01-07 NOTE — TELEPHONE ENCOUNTER
Nocturnal hypoxia does not qualify the patient for oxygen.. They need to know why or what is causing the nocturnal hypoxia then they will run it thru her insurance again.

## 2020-01-07 NOTE — TELEPHONE ENCOUNTER
1. Caller Name: Ezio Mora                                        Call Back Number: [phone]      Patient approves a detailed voicemail message: yes    2. SPECIFIC Action To Be Taken: Needs a referral to [facility]    3. Diagnosis/Clinical Reason for Request: Patient called stating she was supposed to have a referral to see [facility], because her insurance is denying her O2    4. Specialty & Provider Name/Lab/Imaging Location: [facility]    5. Is appointment scheduled for requested order/referral: no    Patient was informed they will receive a return phone call from the office ONLY if there are any questions before processing their request. Advised to call back if they haven't received a call from the referral department in 5 days.

## 2020-01-08 NOTE — TELEPHONE ENCOUNTER
Replaced order for night oxygen based on clinical fatigue, overnight oxygen desaturation consistent with chronic respiratory failure.    Please forward to [facility].

## 2020-01-09 ENCOUNTER — TELEPHONE (OUTPATIENT)
Dept: MEDICAL GROUP | Facility: PHYSICIAN GROUP | Age: 70
End: 2020-01-09

## 2020-01-09 PROBLEM — J41.0 SIMPLE CHRONIC BRONCHITIS (HCC): Status: ACTIVE | Noted: 2020-01-09

## 2020-01-09 NOTE — TELEPHONE ENCOUNTER
[facility] came in for Pt stating that Dx code J96.11 will only be accepted if Pt has COPD. Please addend Pt note to reflect this.

## 2020-02-10 ENCOUNTER — OFFICE VISIT (OUTPATIENT)
Dept: MEDICAL GROUP | Facility: MEDICAL CENTER | Age: 70
End: 2020-02-10
Payer: MEDICARE

## 2020-02-10 VITALS
HEIGHT: 60 IN | OXYGEN SATURATION: 94 % | BODY MASS INDEX: 28.66 KG/M2 | DIASTOLIC BLOOD PRESSURE: 80 MMHG | WEIGHT: 146 LBS | TEMPERATURE: 98.9 F | SYSTOLIC BLOOD PRESSURE: 120 MMHG | HEART RATE: 72 BPM

## 2020-02-10 DIAGNOSIS — I34.1 MITRAL VALVE ANTERIOR LEAFLET PROLAPSE: ICD-10-CM

## 2020-02-10 DIAGNOSIS — I51.9 SYSTOLIC DYSFUNCTION: ICD-10-CM

## 2020-02-10 DIAGNOSIS — I47.10 PSVT (PAROXYSMAL SUPRAVENTRICULAR TACHYCARDIA) (HCC): ICD-10-CM

## 2020-02-10 DIAGNOSIS — Z78.0 ASYMPTOMATIC MENOPAUSAL STATE: ICD-10-CM

## 2020-02-10 DIAGNOSIS — J30.89 CHRONIC NONSEASONAL ALLERGIC RHINITIS DUE TO POLLEN: ICD-10-CM

## 2020-02-10 DIAGNOSIS — Z12.11 COLON CANCER SCREENING: ICD-10-CM

## 2020-02-10 DIAGNOSIS — Z23 NEED FOR VACCINATION: ICD-10-CM

## 2020-02-10 DIAGNOSIS — F51.04 CHRONIC INSOMNIA: ICD-10-CM

## 2020-02-10 DIAGNOSIS — J45.40 MODERATE PERSISTENT ASTHMA WITHOUT COMPLICATION: ICD-10-CM

## 2020-02-10 DIAGNOSIS — E78.2 MIXED HYPERLIPIDEMIA: ICD-10-CM

## 2020-02-10 PROCEDURE — 99204 OFFICE O/P NEW MOD 45 MIN: CPT | Mod: 25 | Performed by: FAMILY MEDICINE

## 2020-02-10 PROCEDURE — G0009 ADMIN PNEUMOCOCCAL VACCINE: HCPCS | Performed by: FAMILY MEDICINE

## 2020-02-10 PROCEDURE — 90732 PPSV23 VACC 2 YRS+ SUBQ/IM: CPT | Performed by: FAMILY MEDICINE

## 2020-02-10 RX ORDER — TRAZODONE HYDROCHLORIDE 50 MG/1
50 TABLET ORAL
Qty: 30 TAB | Refills: 3 | Status: SHIPPED | OUTPATIENT
Start: 2020-02-10 | End: 2022-01-31

## 2020-02-10 ASSESSMENT — PATIENT HEALTH QUESTIONNAIRE - PHQ9: CLINICAL INTERPRETATION OF PHQ2 SCORE: 0

## 2020-02-11 NOTE — PROGRESS NOTES
CC: Patient: Asthma, SVT, allergic rhinitis, chronic insomnia, systolic dysfunction, mitral valve prolapse    HPI:   Ezio presents today to establish new PCP.    Patient has been active and independent with all ADLs.  Has the following chronic medical issues:    Moderate persistent asthma without complication  Currently denies any cough or shortness of breath.However patient stated that she has been having more frequent asthma exacerbation in the past 1 year, wants to be evaluated by a pulmonologist.  She has been on Arnuity Ellipta, Serevent diskus , and albuterol as needed.  Oxygen saturation has been normal.    PSVT (paroxysmal supraventricular tachycardia) (HCC)/systolic dysfunction/mitral valve prolapse  Patient is currently asymptomatic.  However sometimes she gets mild SVT, lately has been has been helping.  Patient was seen by cardiologist a while ago.  Had an echocardiogram  done in 2013 as documented by the cardiologist, it showed: ejection fraction of 45% mild mitral valve prolapse with mild mitral regurgitation.    Chronic nonseasonal allergic rhinitis due to pollen  She been doing fine on the Flonase as needed.    Chronic insomnia  She has been having problem staying and falling asleep.  Tried over-the-counter sleep aids has not been helping.  Denies history of depression or anxiety.      Mixed hyperlipidemia  Last lipid panel was checked in 11/2018:   2mo ago 3yr ago 5yr ago    Cholesterol,Tot 100 - 199 mg/dL 245High   219High   239High     Triglycerides 0 - 149 mg/dL 53  52  72    HDL >=40 mg/dL 91  89  96    LDL <100 mg/dL 143High   120High   129High     Resulting Agency  [facility]       Not on medication.  No history of diabetes, CAD, or stroke.    Patient is due for colonoscopy, bone density, and pneumonia shot.      Patient Active Problem List    Diagnosis Date Noted   • Simple chronic bronchitis (HCC) 01/09/2020   • Right hip pain 11/25/2019   • PSVT (paroxysmal supraventricular tachycardia) (HCC)  04/12/2018   • Chronic nonseasonal allergic rhinitis due to pollen 04/12/2018   • Anxiety 08/28/2017   • Obesity (BMI 30-39.9) 08/28/2017   • Palpitations 08/07/2017   • Blocked eustachian tube 12/30/2016   • Chronic fatigue 09/28/2016   • Pulmonary nodule 06/28/2016   • Moderate persistent asthma without complication 10/28/2014       Current Outpatient Medications   Medication Sig Dispense Refill   • traZODone (DESYREL) 50 MG Tab Take 1 Tab by mouth every bedtime. 30 Tab 3   • montelukast (SINGULAIR) 10 MG Tab TAKE 1 TABLET BY MOUTH EVERY DAY 90 Tab 3   • Fluticasone Furoate (ARNUITY ELLIPTA) 100 MCG/ACT AEROSOL POWDER, BREATH ACTIVATED Inhale 1 Inhalation by mouth every day. 90 Each 3   • DILTIAZem (CARDIZEM) 30 MG Tab TAKE 1 TABLET BY MOUTH TWICE DAILY 180 Tab 1   • albuterol (PROAIR HFA) 108 (90 Base) MCG/ACT Aero Soln inhalation aerosol Inhale 2 Puffs by mouth every 6 hours as needed. 8.5 g 3   • SEREVENT DISKUS 50 MCG/DOSE AEROSOL POWDER, BREATH ACTIVATED INHALE 1 PUFF BY MOUTH TWICE DAILY 1 Each 5   • diclofenac EC (VOLTAREN) 75 MG Tablet Delayed Response Take 1 Tab by mouth 2 times a day. 60 Tab 3     No current facility-administered medications for this visit.          Allergies as of 02/10/2020 - Reviewed 02/10/2020   Allergen Reaction Noted   • Terramycin [oxytetracycline]  05/18/2015        ROS: Denies any chest pain, Shortness of breath, Changes bowel or bladder, Lower extremity edema.    Physical Exam:  Gen.: Well-developed, well-nourished, no apparent distress,pleasant and cooperative with the examination  Skin:  Warm and dry with good turgor. No rashes or suspicious lesions in visible areas  Eye: PERRLA, conjunctiva and sclera clear, lids normal  HEENT: Normocephalic/atraumatic, sinuses nontender with palpation, TMs clear, nares patent with pink mucosa and clear rhinorrhea, lips without lesions, oropharynx clear.  Neck: Trachea midline,no masses or adenopathy  Thyroid: normal consistency and size. No  masses or nodules. Not tender with palpation.  Cor: Regular rate and rhythm without murmur, gallop or rub.  Lungs: Respirations unlabored.Clear to auscultation with equal breath sounds bilaterally. No wheezes, rhonchi.  Abdomen: Soft nontender without hepatosplenomegaly or masses appreciated, normoactive bowel sounds. No hernias.  Extremities: No cyanosis, clubbing or edema, Symmetrical without deformities or malformations. Pulses 2+ and symmetrical both upper and lower extremities  Lymphatic: No abnormal adenopathy of the neck groin or axillae.  Psych: Alert and oriented x 3.Normal affect, judgement,insight and memory.        Assessment and Plan.   69 y.o. female     1. Moderate persistent asthma without complication  Stable.  Asymptomatic.  Continue on Arnuity Ellipta, Serevent diskus , and albuterol as needed.  However patient stated that she has been having more frequent asthma exacerbation in the past 1 year, wants to be evaluated by a pulmonologist.    - REFERRAL TO PULMONOLOGY    2. PSVT (paroxysmal supraventricular tachycardia) (HCC)  Stable.  Asymptomatic.  Continue diltiazem 30 mg twice a day.    3. Chronic nonseasonal allergic rhinitis due to pollen  Is been doing fine on the Flonase as needed.    4. Chronic insomnia  We will try trazodone 50 mg daily.    - traZODone (DESYREL) 50 MG Tab; Take 1 Tab by mouth every bedtime.  Dispense: 30 Tab; Refill: 3    5. Colon cancer screening  Due for colonoscopy.    - REFERRAL TO GI FOR COLONOSCOPY    6. Asymptomatic menopausal state  Due for bone density.    - DS-BONE DENSITY STUDY (DEXA); Future    7. Need for vaccination  Due for pneumonia shot.  Pneumonia 23 is given today.    - Pneumococal Polysaccharide Vaccine 23-Valent =>1YO SQ/IM    8. Systolic dysfunction  Stable.  Asymptomatic.  Last echocardiogram was done in 2013 as documented by the cardiologist, it showed: ejection fraction of 45% mild mitral valve prolapse with mild mitral regurgitation.  We will  repeat echocardiogram.    9. Mitral valve anterior leaflet prolapse  Stable, sometimes gets mild SVT.  However diltiazem has been helping.  Repeat echocardiogram.    - EC-ECHOCARDIOGRAM COMPLETE W/ CONT; Future    10. Mixed hyperlipidemia  Last lipid panel was checked in 11/2018:   2mo ago 3yr ago 5yr ago    Cholesterol,Tot 100 - 199 mg/dL 245High   219High   239High     Triglycerides 0 - 149 mg/dL 53  52  72    HDL >=40 mg/dL 91  89  96    LDL <100 mg/dL 143High   120High   129High     Resulting Agency  [facility]       Patient is counseled on lifestyle modification.  We will repeat lipid panel.    - LIPID PANEL

## 2020-02-18 ENCOUNTER — TELEPHONE (OUTPATIENT)
Dept: MEDICAL GROUP | Facility: MEDICAL CENTER | Age: 70
End: 2020-02-18

## 2020-02-18 DIAGNOSIS — Z12.11 COLON CANCER SCREENING: ICD-10-CM

## 2020-02-18 NOTE — TELEPHONE ENCOUNTER
Patient wants the Referral to go [facility] she does not want to see Dr. Hawkins please send another one for [facility]

## 2020-03-05 ENCOUNTER — HOSPITAL ENCOUNTER (OUTPATIENT)
Dept: RADIOLOGY | Facility: MEDICAL CENTER | Age: 70
End: 2020-03-05
Attending: FAMILY MEDICINE
Payer: MEDICARE

## 2020-03-05 DIAGNOSIS — Z78.0 ASYMPTOMATIC MENOPAUSAL STATE: ICD-10-CM

## 2020-03-05 PROCEDURE — 77080 DXA BONE DENSITY AXIAL: CPT

## 2020-03-10 ENCOUNTER — HOSPITAL ENCOUNTER (OUTPATIENT)
Dept: CARDIOLOGY | Facility: MEDICAL CENTER | Age: 70
End: 2020-03-10
Attending: FAMILY MEDICINE
Payer: MEDICARE

## 2020-03-10 DIAGNOSIS — I51.9 SYSTOLIC DYSFUNCTION: ICD-10-CM

## 2020-03-10 DIAGNOSIS — I34.1 MITRAL VALVE ANTERIOR LEAFLET PROLAPSE: ICD-10-CM

## 2020-03-10 LAB
LV EJECT FRACT  99904: 65
LV EJECT FRACT MOD 2C 99903: 58.21
LV EJECT FRACT MOD 4C 99902: 63.12
LV EJECT FRACT MOD BP 99901: 59.95

## 2020-03-10 PROCEDURE — 93306 TTE W/DOPPLER COMPLETE: CPT

## 2020-03-10 PROCEDURE — 93306 TTE W/DOPPLER COMPLETE: CPT | Mod: 26 | Performed by: INTERNAL MEDICINE

## 2020-03-11 DIAGNOSIS — J45.20 MILD INTERMITTENT ASTHMA WITHOUT COMPLICATION: ICD-10-CM

## 2020-03-19 ENCOUNTER — APPOINTMENT (OUTPATIENT)
Dept: PULMONOLOGY | Facility: HOSPICE | Age: 70
End: 2020-03-19
Payer: MEDICARE

## 2020-04-16 ENCOUNTER — APPOINTMENT (OUTPATIENT)
Dept: PULMONOLOGY | Facility: HOSPICE | Age: 70
End: 2020-04-16
Payer: MEDICARE

## 2020-05-27 ENCOUNTER — OFFICE VISIT (OUTPATIENT)
Dept: PULMONOLOGY | Facility: HOSPICE | Age: 70
End: 2020-05-27
Payer: MEDICARE

## 2020-05-27 VITALS
SYSTOLIC BLOOD PRESSURE: 108 MMHG | DIASTOLIC BLOOD PRESSURE: 80 MMHG | WEIGHT: 147 LBS | HEIGHT: 60 IN | HEART RATE: 78 BPM | TEMPERATURE: 97.9 F | RESPIRATION RATE: 16 BRPM | BODY MASS INDEX: 28.86 KG/M2 | OXYGEN SATURATION: 95 %

## 2020-05-27 DIAGNOSIS — G47.34 NOCTURNAL HYPOXIA: ICD-10-CM

## 2020-05-27 DIAGNOSIS — J30.9 ALLERGIC RHINITIS, UNSPECIFIED SEASONALITY, UNSPECIFIED TRIGGER: ICD-10-CM

## 2020-05-27 DIAGNOSIS — J45.20 MILD INTERMITTENT ASTHMA WITHOUT COMPLICATION: ICD-10-CM

## 2020-05-27 PROCEDURE — 99204 OFFICE O/P NEW MOD 45 MIN: CPT | Performed by: INTERNAL MEDICINE

## 2020-05-27 SDOH — HEALTH STABILITY: MENTAL HEALTH: HOW OFTEN DO YOU HAVE 6 OR MORE DRINKS ON ONE OCCASION?: NEVER

## 2020-05-27 SDOH — HEALTH STABILITY: MENTAL HEALTH: HOW OFTEN DO YOU HAVE A DRINK CONTAINING ALCOHOL?: 2-4 TIMES A MONTH

## 2020-05-27 SDOH — HEALTH STABILITY: MENTAL HEALTH: HOW MANY STANDARD DRINKS CONTAINING ALCOHOL DO YOU HAVE ON A TYPICAL DAY?: 1 OR 2

## 2020-05-27 ASSESSMENT — ENCOUNTER SYMPTOMS
DEPRESSION: 0
EYE DISCHARGE: 0
WEIGHT LOSS: 0
ABDOMINAL PAIN: 0
SPEECH CHANGE: 0
PHOTOPHOBIA: 0
PND: 0
DIZZINESS: 0
SORE THROAT: 0
DIARRHEA: 0
STRIDOR: 0
SINUS PAIN: 0
CONSTIPATION: 0
MYALGIAS: 0
FEVER: 0
VOMITING: 0
NAUSEA: 0
DIAPHORESIS: 0
CLAUDICATION: 0
BLURRED VISION: 0
ORTHOPNEA: 0
HEADACHES: 0
TREMORS: 0
WEAKNESS: 0
COUGH: 0
CHILLS: 0
NECK PAIN: 0
EYE PAIN: 0
EYE REDNESS: 0
HEARTBURN: 0
HEMOPTYSIS: 0
WHEEZING: 0
FOCAL WEAKNESS: 0
BACK PAIN: 0
SPUTUM PRODUCTION: 0
FALLS: 0
PALPITATIONS: 0
SHORTNESS OF BREATH: 0
DOUBLE VISION: 0

## 2020-05-27 ASSESSMENT — FIBROSIS 4 INDEX: FIB4 SCORE: 0.91

## 2020-05-27 NOTE — PROGRESS NOTES
Chief Complaint   Patient presents with   • Establish Care     referral 2/10/2020 MD Yessenia DX moderate persistent asthma previous pma 11/19/15    • Results     echo 3/10/2020       HPI: This patient is a 69 y.o. female presenting for evaluation of fatigue and asthma.  The patient has medical history significant for longstanding asthma for which she has been on Arnuity and Serevent discus with as needed short acting bronchodilators.  No treatment for acute exacerbation with prednisone or hospitalizations in the past 5 years.  She also has mitral valve prolapse which causes minimal symptoms.  Echocardiogram from March of this year showed mild insufficiency otherwise normal study.  She is a lifelong non-smoker.  She has no history of occupational or environmental exposures.  She currently lives in [place] with her  who has been battling leukemia for the past year.  He was originally diagnosed in November 2018 after which the patient was responsible for the majority of his care at home.  She had an overnight oximetry study done in January 2019 ordered by her primary care provider to evaluate fatigue.  This did show significant desaturation however patient was not started on supplemental oxygen and underwent repeat overnight study in January of this year which based on report that I have was only recorded from 8:20 PM to 10:10 PM.  There was about a 40-minute window where the patient was between 88 and 90% continuously.  She does have some daytime sleepiness and frequently takes a nap during the day but is up several times during the night to care for her .  She denies morning headaches.  She feels well rested after a nap.  Her other reason for presentation today is persistent nasal congestion and a feeling that her current combination of Arnuity and Serevent is not as effective as Advair which she was taking in the past.  She does occasionally take a Claritin and is on montelukast but does not use  intranasal steroids.  No fevers, chills, night sweats, weight loss.  No cough.  No chest pain.  No family history of atopic or pulmonary disease.  [facility] and pulmonary function testing at that time showed normal airflows, normal lung volumes and DLCO.    Past Medical History:   Diagnosis Date   • ASTHMA    • Chronic fatigue 9/28/2016   • Degenerative arthritis of spine    • Mild intermittent asthma with acute exacerbation 6/18/2015   • Post-menopause on HRT (hormone replacement therapy)    • Pulmonary nodule 6/28/2016    Pulmonary, stable CT 8/15   • Rheumatic fever    • Weight gain 6/28/2016       Social History     Socioeconomic History   • Marital status:      Spouse name: Not on file   • Number of children: Not on file   • Years of education: Not on file   • Highest education level: Not on file   Occupational History   • Not on file   Social Needs   • Financial resource strain: Not on file   • Food insecurity     Worry: Not on file     Inability: Not on file   • Transportation needs     Medical: Not on file     Non-medical: Not on file   Tobacco Use   • Smoking status: Never Smoker   • Smokeless tobacco: Never Used   Substance and Sexual Activity   • Alcohol use: Yes     Alcohol/week: 0.6 - 1.2 oz     Types: 1 - 2 Glasses of wine per week     Frequency: 2-4 times a month     Drinks per session: 1 or 2     Binge frequency: Never     Comment: occ wine   • Drug use: No   • Sexual activity: Not on file   Lifestyle   • Physical activity     Days per week: Not on file     Minutes per session: Not on file   • Stress: Not on file   Relationships   • Social connections     Talks on phone: Not on file     Gets together: Not on file     Attends Yarsanism service: Not on file     Active member of club or organization: Not on file     Attends meetings of clubs or organizations: Not on file     Relationship status: Not on file   • Intimate partner violence     Fear of  current or ex partner: Not on file     Emotionally abused: Not on file     Physically abused: Not on file     Forced sexual activity: Not on file   Other Topics Concern   • Not on file   Social History Narrative           Family History   Problem Relation Age of Onset   • Other Mother         global decline due to age   • Heart Disease Father    • Thyroid Child        Current Outpatient Medications on File Prior to Visit   Medication Sig Dispense Refill   • DILTIAZem (CARDIZEM) 30 MG Tab Take 1 Tab by mouth 2 Times a Day. 180 Tab 3   • montelukast (SINGULAIR) 10 MG Tab TAKE 1 TABLET BY MOUTH EVERY DAY 90 Tab 3   • albuterol (PROAIR HFA) 108 (90 Base) MCG/ACT Aero Soln inhalation aerosol Inhale 2 Puffs by mouth every 6 hours as needed. 8.5 g 3   • traZODone (DESYREL) 50 MG Tab Take 1 Tab by mouth every bedtime. (Patient not taking: Reported on 5/27/2020) 30 Tab 3   • diclofenac EC (VOLTAREN) 75 MG Tablet Delayed Response Take 1 Tab by mouth 2 times a day. 60 Tab 3     No current facility-administered medications on file prior to visit.        Allergies: Terramycin [oxytetracycline]    ROS:   Review of Systems   Constitutional: Positive for malaise/fatigue. Negative for chills, diaphoresis, fever and weight loss.   HENT: Positive for congestion. Negative for ear discharge, ear pain, hearing loss, nosebleeds, sinus pain, sore throat and tinnitus.    Eyes: Negative for blurred vision, double vision, photophobia, pain, discharge and redness.   Respiratory: Negative for cough, hemoptysis, sputum production, shortness of breath, wheezing and stridor.    Cardiovascular: Negative for chest pain, palpitations, orthopnea, claudication, leg swelling and PND.   Gastrointestinal: Negative for abdominal pain, constipation, diarrhea, heartburn, nausea and vomiting.   Genitourinary: Negative for dysuria and urgency.   Musculoskeletal: Negative for back pain, falls, joint pain, myalgias and neck pain.   Skin: Negative for  itching and rash.   Neurological: Negative for dizziness, tremors, speech change, focal weakness, weakness and headaches.   Endo/Heme/Allergies: Negative for environmental allergies.   Psychiatric/Behavioral: Negative for depression.       /80 (BP Location: Left arm, Patient Position: Sitting, BP Cuff Size: Adult)   Pulse 78   Temp 36.6 °C (97.9 °F) (Temporal)   Resp 16   Ht 1.524 m (5')   Wt 66.7 kg (147 lb)   SpO2 95%     Physical Exam:  Physical Exam  Constitutional:       General: She is not in acute distress.     Appearance: Normal appearance. She is well-developed and normal weight.   HENT:      Head: Normocephalic and atraumatic.      Right Ear: External ear normal.      Left Ear: External ear normal.      Nose: Nose normal. No congestion.      Mouth/Throat:      Mouth: Mucous membranes are moist.      Pharynx: Oropharynx is clear. No oropharyngeal exudate.   Eyes:      General: No scleral icterus.     Extraocular Movements: Extraocular movements intact.      Conjunctiva/sclera: Conjunctivae normal.      Pupils: Pupils are equal, round, and reactive to light.   Neck:      Musculoskeletal: Normal range of motion and neck supple.      Vascular: No JVD.      Trachea: No tracheal deviation.   Cardiovascular:      Rate and Rhythm: Normal rate and regular rhythm.      Heart sounds: Normal heart sounds. No murmur. No friction rub. No gallop.    Pulmonary:      Effort: Pulmonary effort is normal. No accessory muscle usage or respiratory distress.      Breath sounds: Normal breath sounds. No wheezing or rales.   Abdominal:      General: There is no distension.      Palpations: Abdomen is soft.      Tenderness: There is no abdominal tenderness.   Musculoskeletal: Normal range of motion.         General: No tenderness or deformity.      Right lower leg: No edema.      Left lower leg: No edema.   Lymphadenopathy:      Cervical: No cervical adenopathy.   Skin:     General: Skin is warm and dry.      Findings:  No rash.      Nails: There is no clubbing.     Neurological:      Mental Status: She is alert and oriented to person, place, and time.      Cranial Nerves: No cranial nerve deficit.      Gait: Gait normal.   Psychiatric:         Mood and Affect: Mood normal.         Behavior: Behavior normal.         PFTs as reviewed by me personally:  As per HPI    Imaging as reviewed by me personally:as per HPI    Assessment:  1. Mild intermittent asthma without complication  PULMONARY FUNCTION TESTS -Test requested: Complete Pulmonary Function Test   2. Allergic rhinitis, unspecified seasonality, unspecified trigger     3. Nocturnal hypoxia         Plan:  1.  This is fairly well-controlled although patient does feel her current medical regimen is less effective.  I reviewed her coverage and Arnuity and Serevent is the same as Advair with similar coverage and the combination with me only 1 co-pay.  Given this combination was more effective in the past medical back to Advair 100 Diskus.  We will continue short acting bronchodilators as needed.  Continue montelukast.  I did advise that the patient take a daily antihistamine in the morning, Singulair at night and start intranasal steroids at night.  We will see her back in 3 months with PFTs.  She is up-to-date on vaccines.  2.  See discussion above.  Intranasal steroids, 2 sprays each nostril at night before bed.  3.  This is mild and study reports being on 2 L oxygen however patient reports being on room air.  Additionally his study was only for a little over 2 hours.  She does not have symptoms to suggest significant obstructive sleep apnea.  I would like to treat her nasal congestion allow her some time to stabilize on respiratory medications after which we can consider repeat overnight oximetry study.      Return in 3 months with PFTs

## 2020-06-22 ENCOUNTER — TELEMEDICINE (OUTPATIENT)
Dept: MEDICAL GROUP | Facility: MEDICAL CENTER | Age: 70
End: 2020-06-22
Payer: MEDICARE

## 2020-06-22 VITALS — RESPIRATION RATE: 16 BRPM | HEIGHT: 60 IN | WEIGHT: 146 LBS | BODY MASS INDEX: 28.66 KG/M2

## 2020-06-22 DIAGNOSIS — J45.40 MODERATE PERSISTENT ASTHMA WITHOUT COMPLICATION: ICD-10-CM

## 2020-06-22 DIAGNOSIS — E78.2 MIXED HYPERLIPIDEMIA: ICD-10-CM

## 2020-06-22 DIAGNOSIS — I47.10 PSVT (PAROXYSMAL SUPRAVENTRICULAR TACHYCARDIA) (HCC): ICD-10-CM

## 2020-06-22 PROCEDURE — 99214 OFFICE O/P EST MOD 30 MIN: CPT | Mod: 95,CR | Performed by: FAMILY MEDICINE

## 2020-06-22 ASSESSMENT — FIBROSIS 4 INDEX: FIB4 SCORE: 0.91

## 2020-06-22 NOTE — PROGRESS NOTES
This encounter was conducted via Zoom meeting  Verbal consent was obtained. Patient's identity was verified.       CC: Asthma, PSVT, hyperlipidemia                                                                                                                                      HPI:   Ezio presents today with the following.    Moderate persistent asthma without complication  Patient has been doing fine on fluticasone- salmeterol inhaler twice a day, and albuterol as needed.  Currently denies any cough or shortness of breath.  She has been following up with pulmonologist, and she is scheduled for pulmonary function test next month.    PSVT (paroxysmal supraventricular tachycardia) ([facility])  Denies any palpitation and shortness of breath.  Diltiazem 30 mg twice a day has been helping.  She follows up with cardiology as needed    Mixed hyperlipidemia  Last lipid panel showed high total cholesterol and LDL, no history of diabetes, CAD, or stroke.  Patient has been trying diet control.    Patient Active Problem List    Diagnosis Date Noted   • Simple chronic bronchitis ([facility]) 01/09/2020   • Right hip pain 11/25/2019   • PSVT (paroxysmal supraventricular tachycardia) ([facility]) 04/12/2018   • Chronic nonseasonal allergic rhinitis due to pollen 04/12/2018   • Anxiety 08/28/2017   • Obesity (BMI 30-39.9) 08/28/2017   • Palpitations 08/07/2017   • Blocked eustachian tube 12/30/2016   • Chronic fatigue 09/28/2016   • Pulmonary nodule 06/28/2016   • Moderate persistent asthma without complication 10/28/2014       Current Outpatient Medications   Medication Sig Dispense Refill   • fluticasone-salmeterol (ADVAIR DISKUS) 100-50 MCG/DOSE AEROSOL POWDER, BREATH ACTIVATED Inhale 1 Puff by mouth 2 times a day. Rinse mouth after each use. 1 Inhaler 11   • DILTIAZem (CARDIZEM) 30 MG Tab Take 1 Tab by mouth 2 Times a Day. 180 Tab 3   • traZODone (DESYREL) 50 MG Tab Take 1 Tab by mouth every bedtime. (Patient not taking: Reported on  5/27/2020) 30 Tab 3   • montelukast (SINGULAIR) 10 MG Tab TAKE 1 TABLET BY MOUTH EVERY DAY 90 Tab 3   • diclofenac EC (VOLTAREN) 75 MG Tablet Delayed Response Take 1 Tab by mouth 2 times a day. 60 Tab 3   • albuterol (PROAIR HFA) 108 (90 Base) MCG/ACT Aero Soln inhalation aerosol Inhale 2 Puffs by mouth every 6 hours as needed. 8.5 g 3     No current facility-administered medications for this visit.          Allergies as of 06/22/2020 - Reviewed 05/27/2020   Allergen Reaction Noted   • Terramycin [oxytetracycline]  05/18/2015        ROS:  Denies, chest pain, Shortness of breath, Edema.         Physical Exam:    Resp 16   Ht 1.524 m (5')   Wt 66.2 kg (146 lb)   BMI 28.51 kg/m²     Constitutional: Alert, no distress, well-groomed.  Skin: No rashes in visible areas.  Eye: Round. Conjunctiva clear, lNo icterus.   ENMT: Lips pink without lesions, good dentition, moist mucous membranes. Phonation normal.  Neck: No masses, no thyromegaly. Moves freely without pain.  Respiratory: Unlabored respiratory effort, no cough or audible wheeze  Psych: Alert and oriented x3, normal affect and mood.          Assessment and Plan.   69 y.o. female with the following issues.    1. Moderate persistent asthma without complication  Stable.  Continue on fluticasone-salmeterol inhaler twice a day, and albuterol as needed, she has not been using the albuterol because she has not needed for a while.    2. PSVT (paroxysmal supraventricular tachycardia) ([facility])  Stable.  Asymptomatic.  Continue Cardizem 30 mg twice a day.    3. Mixed hyperlipidemia  Last lipid panel was:   Ref Range & Units  7mo ago   Cholesterol,Tot  100 - 199 mg/dL  245High       Triglycerides  0 - 149 mg/dL  53     HDL  >=40 mg/dL  91     LDL  <100 mg/dL  143High       She has been trying diet control.  We will repeat lipid panel  Patient is counseled on lifestyle medication.      - LIPID PANEL

## 2020-06-23 ENCOUNTER — TELEPHONE (OUTPATIENT)
Dept: MEDICAL GROUP | Facility: MEDICAL CENTER | Age: 70
End: 2020-06-23

## 2020-07-03 ENCOUNTER — HOSPITAL ENCOUNTER (OUTPATIENT)
Dept: LAB | Facility: MEDICAL CENTER | Age: 70
End: 2020-07-03
Attending: FAMILY MEDICINE
Payer: MEDICARE

## 2020-07-03 LAB
CHOLEST SERPL-MCNC: 245 MG/DL (ref 100–199)
FASTING STATUS PATIENT QL REPORTED: NORMAL
HDLC SERPL-MCNC: 106 MG/DL
LDLC SERPL CALC-MCNC: 129 MG/DL
TRIGL SERPL-MCNC: 48 MG/DL (ref 0–149)

## 2020-07-03 PROCEDURE — 80061 LIPID PANEL: CPT

## 2020-07-03 PROCEDURE — 36415 COLL VENOUS BLD VENIPUNCTURE: CPT

## 2020-08-27 ENCOUNTER — NON-PROVIDER VISIT (OUTPATIENT)
Dept: PULMONOLOGY | Facility: HOSPICE | Age: 70
End: 2020-08-27
Attending: INTERNAL MEDICINE
Payer: MEDICARE

## 2020-08-27 ENCOUNTER — OFFICE VISIT (OUTPATIENT)
Dept: PULMONOLOGY | Facility: HOSPICE | Age: 70
End: 2020-08-27
Payer: MEDICARE

## 2020-08-27 VITALS
RESPIRATION RATE: 16 BRPM | HEART RATE: 68 BPM | HEIGHT: 60 IN | TEMPERATURE: 97.7 F | BODY MASS INDEX: 28.47 KG/M2 | DIASTOLIC BLOOD PRESSURE: 66 MMHG | OXYGEN SATURATION: 92 % | WEIGHT: 145 LBS | SYSTOLIC BLOOD PRESSURE: 112 MMHG

## 2020-08-27 VITALS — WEIGHT: 145 LBS | HEIGHT: 60 IN | BODY MASS INDEX: 28.47 KG/M2

## 2020-08-27 DIAGNOSIS — J30.9 ALLERGIC RHINITIS, UNSPECIFIED SEASONALITY, UNSPECIFIED TRIGGER: ICD-10-CM

## 2020-08-27 DIAGNOSIS — J45.20 MILD INTERMITTENT ASTHMA WITHOUT COMPLICATION: ICD-10-CM

## 2020-08-27 DIAGNOSIS — G47.34 NOCTURNAL HYPOXIA: ICD-10-CM

## 2020-08-27 PROCEDURE — 94726 PLETHYSMOGRAPHY LUNG VOLUMES: CPT | Performed by: INTERNAL MEDICINE

## 2020-08-27 PROCEDURE — 99213 OFFICE O/P EST LOW 20 MIN: CPT | Mod: 25 | Performed by: INTERNAL MEDICINE

## 2020-08-27 PROCEDURE — 94060 EVALUATION OF WHEEZING: CPT | Performed by: INTERNAL MEDICINE

## 2020-08-27 PROCEDURE — 94729 DIFFUSING CAPACITY: CPT | Performed by: INTERNAL MEDICINE

## 2020-08-27 ASSESSMENT — ENCOUNTER SYMPTOMS
FALLS: 0
HEMOPTYSIS: 0
FEVER: 0
DIARRHEA: 0
CLAUDICATION: 0
CHILLS: 0
SPEECH CHANGE: 0
NECK PAIN: 0
SPUTUM PRODUCTION: 0
DIZZINESS: 0
DIAPHORESIS: 0
ABDOMINAL PAIN: 0
COUGH: 0
VOMITING: 0
EYE REDNESS: 0
EYE PAIN: 0
SINUS PAIN: 0
BACK PAIN: 0
PALPITATIONS: 0
BLURRED VISION: 0
ORTHOPNEA: 0
WEIGHT LOSS: 0
CONSTIPATION: 0
PHOTOPHOBIA: 0
NAUSEA: 0
FOCAL WEAKNESS: 0
SHORTNESS OF BREATH: 0
STRIDOR: 0
DOUBLE VISION: 0
DEPRESSION: 0
SORE THROAT: 0
EYE DISCHARGE: 0
MYALGIAS: 0
WHEEZING: 0
HEADACHES: 0
TREMORS: 0
HEARTBURN: 0
WEAKNESS: 0
PND: 0

## 2020-08-27 ASSESSMENT — PULMONARY FUNCTION TESTS
FVC_PREDICTED: 2.59
FEV1_PERCENT_PREDICTED: 95
FEV1_PERCENT_PREDICTED: 94
FEV1/FVC_PREDICTED: 76
FEV1/FVC: 86
FEV1/FVC_PERCENT_LLN: 64
FVC: 2.15
FEV1/FVC: 88
FEV1/FVC_PERCENT_PREDICTED: 114
FEV1/FVC_PERCENT_PREDICTED: 113
FEV1/FVC: 87
FEV1/FVC_PERCENT_PREDICTED: 112
FVC_PERCENT_PREDICTED: 83
FVC_PERCENT_PREDICTED: 83
FEV1_PREDICTED: 1.96
FEV1/FVC_PERCENT_CHANGE: -1
FEV1/FVC: 86.11
FEV1: 1.86
FEV1_LLN: 1.64
FEV1_PERCENT_CHANGE: -1
FEV1: 1.88
FEV1_PERCENT_CHANGE: 0
FEV1/FVC_PERCENT_PREDICTED: 76
FVC: 2.16
FVC_LLN: 2.16
FEV1/FVC_PERCENT_PREDICTED: 114

## 2020-08-27 ASSESSMENT — FIBROSIS 4 INDEX
FIB4 SCORE: 0.91
FIB4 SCORE: 0.91

## 2020-08-27 NOTE — PROCEDURES
Tech: Libia Espino, RRT  Tech notes: Good patient effort & cooperation.  The results of this test meet the ATS/ERS standards for acceptability & reproducibility.  Test was performed on the [employer] Body Plethysmograph-Elite DX system.  Predicted values were GLI-2012 for spirometry, GLI- 2017 for DLCO, ITS for Lung Volumes.  The DLCO was uncorrected for Hgb.  A bronchodilator of Xopenex HFA -2puffs via spacer administered.  DLCO performed during dilation period.    Interpretation;    Interpretation:  1.  Both FEV1 and FVC are within normal limits however there is evidence for mild restriction with FVC reduced at 83% predicted when compared to FEV1 at 95% predicted with slightly elevated FEV1/FVC ratio 88.  2.  There is no significant bronchodilator response.  3.  Lung volumes are within normal limits.  4.  Diffusion capacity is elevated at 105% predicted.  Pulmonary function testing shows evidence for mild restriction of airflows but overall, values are within normal limits.  This does not rule out reactive airways disease.  Suggest clinical correlation.

## 2020-08-27 NOTE — PROGRESS NOTES
Chief Complaint   Patient presents with   • Asthma     last seen 5/27/2020    • Results     cpft 60 8/27/2020          HPI: This patient is a 69 y.o. female whom is followed in our clinic for reactive airway disease last seen by me on 5/27/20. The patient has medical history significant for longstanding asthma for which she has been on Arnuity and Serevent discus with as needed short acting bronchodilators.  No treatment for acute exacerbation with prednisone or hospitalizations in the past 5 years.  She also has mitral valve prolapse which causes minimal symptoms.  Echocardiogram from March of this year showed mild insufficiency otherwise normal study.  She is a lifelong non-smoker. She had an overnight oximetry study done in January 2019 ordered by her primary care provider to evaluate fatigue.  This did show significant desaturation however patient was not started on supplemental oxygen and underwent repeat overnight study in January of this year which based on report that I have was only recorded from 8:20 PM to 10:10 PM.  There was about a 40-minute window where the patient was between 88 and 90% continuously.    We opted not to evaluate for obstructive sleep apnea given fragmented sleep and significant stress around the fact that she was caring for an ill .  We did however substitute Advair for her Arnuity and Serevent and ordered pulmonary function testing.  I also recommended daily antihistamine and intranasal steroids at night.  She presents today for routine follow-up.  Symptoms are nearly completely resolved on Advair 100.  She has infrequent use of rescue inhaler and has actually forgotten her antihistamine on occasion.  She denies ongoing nasal congestion.  No shortness of breath.  No symptoms of excessive daytime sleepiness.  Pulmonary function testing today shows airflow is within normal limits, normal lung volumes and diffusion capacity 105% predicted.    Past Medical History:   Diagnosis Date    • ASTHMA    • Chronic fatigue 9/28/2016   • Degenerative arthritis of spine    • Mild intermittent asthma with acute exacerbation 6/18/2015   • Post-menopause on HRT (hormone replacement therapy)    • Pulmonary nodule 6/28/2016    Pulmonary, stable CT 8/15   • Rheumatic fever    • Weight gain 6/28/2016       Social History     Socioeconomic History   • Marital status:      Spouse name: Not on file   • Number of children: Not on file   • Years of education: Not on file   • Highest education level: Not on file   Occupational History   • Not on file   Social Needs   • Financial resource strain: Not on file   • Food insecurity     Worry: Not on file     Inability: Not on file   • Transportation needs     Medical: Not on file     Non-medical: Not on file   Tobacco Use   • Smoking status: Never Smoker   • Smokeless tobacco: Never Used   Substance and Sexual Activity   • Alcohol use: Yes     Alcohol/week: 0.6 - 1.2 oz     Types: 1 - 2 Glasses of wine per week     Frequency: 2-4 times a month     Drinks per session: 1 or 2     Binge frequency: Never     Comment: occ wine   • Drug use: No   • Sexual activity: Not on file   Lifestyle   • Physical activity     Days per week: Not on file     Minutes per session: Not on file   • Stress: Not on file   Relationships   • Social connections     Talks on phone: Not on file     Gets together: Not on file     Attends Buddhist service: Not on file     Active member of club or organization: Not on file     Attends meetings of clubs or organizations: Not on file     Relationship status: Not on file   • Intimate partner violence     Fear of current or ex partner: Not on file     Emotionally abused: Not on file     Physically abused: Not on file     Forced sexual activity: Not on file   Other Topics Concern   • Not on file   Social History Narrative           Family History   Problem Relation Age of Onset   • Other Mother         global decline due to age   • Heart Disease  Father    • Thyroid Child        Current Outpatient Medications on File Prior to Visit   Medication Sig Dispense Refill   • fluticasone-salmeterol (ADVAIR DISKUS) 100-50 MCG/DOSE AEROSOL POWDER, BREATH ACTIVATED Inhale 1 Puff by mouth 2 times a day. Rinse mouth after each use. 1 Inhaler 11   • DILTIAZem (CARDIZEM) 30 MG Tab Take 1 Tab by mouth 2 Times a Day. 180 Tab 3   • montelukast (SINGULAIR) 10 MG Tab TAKE 1 TABLET BY MOUTH EVERY DAY 90 Tab 3   • albuterol (PROAIR HFA) 108 (90 Base) MCG/ACT Aero Soln inhalation aerosol Inhale 2 Puffs by mouth every 6 hours as needed. 8.5 g 3   • traZODone (DESYREL) 50 MG Tab Take 1 Tab by mouth every bedtime. (Patient not taking: Reported on 5/27/2020) 30 Tab 3   • diclofenac EC (VOLTAREN) 75 MG Tablet Delayed Response Take 1 Tab by mouth 2 times a day. 60 Tab 3     No current facility-administered medications on file prior to visit.        Terramycin [oxytetracycline]      ROS:   Review of Systems   Constitutional: Negative for chills, diaphoresis, fever, malaise/fatigue and weight loss.   HENT: Positive for congestion. Negative for ear discharge, ear pain, hearing loss, nosebleeds, sinus pain, sore throat and tinnitus.    Eyes: Negative for blurred vision, double vision, photophobia, pain, discharge and redness.   Respiratory: Negative for cough, hemoptysis, sputum production, shortness of breath, wheezing and stridor.    Cardiovascular: Negative for chest pain, palpitations, orthopnea, claudication, leg swelling and PND.   Gastrointestinal: Negative for abdominal pain, constipation, diarrhea, heartburn, nausea and vomiting.   Genitourinary: Negative for dysuria and urgency.   Musculoskeletal: Negative for back pain, falls, joint pain, myalgias and neck pain.   Skin: Negative for itching and rash.   Neurological: Negative for dizziness, tremors, speech change, focal weakness, weakness and headaches.   Endo/Heme/Allergies: Negative for environmental allergies.    Psychiatric/Behavioral: Negative for depression.       /66 (BP Location: Left arm, Patient Position: Sitting, BP Cuff Size: Adult)   Pulse 68   Temp 36.5 °C (97.7 °F) (Temporal)   Resp 16   Ht 1.524 m (5')   Wt 65.8 kg (145 lb)   SpO2 92%   Physical Exam  Constitutional:       Appearance: She is well-developed.   HENT:      Head: Normocephalic and atraumatic.      Left Ear: External ear normal.      Mouth/Throat:      Pharynx: No oropharyngeal exudate.   Eyes:      General: No scleral icterus.     Conjunctiva/sclera: Conjunctivae normal.      Pupils: Pupils are equal, round, and reactive to light.   Neck:      Musculoskeletal: Neck supple.      Vascular: No JVD.      Trachea: No tracheal deviation.   Cardiovascular:      Rate and Rhythm: Normal rate and regular rhythm.      Heart sounds: Normal heart sounds. No murmur. No friction rub. No gallop.    Pulmonary:      Effort: No accessory muscle usage or respiratory distress.      Breath sounds: Normal breath sounds. No wheezing or rales.   Abdominal:      General: There is no distension.      Palpations: Abdomen is soft.      Tenderness: There is no abdominal tenderness.   Musculoskeletal: Normal range of motion.         General: No tenderness or deformity.   Lymphadenopathy:      Cervical: No cervical adenopathy.   Skin:     General: Skin is warm and dry.      Findings: No rash.      Nails: There is no clubbing.     Neurological:      Mental Status: She is alert and oriented to person, place, and time.      Cranial Nerves: No cranial nerve deficit.      Gait: Gait normal.         PFTs as reviewed by me personally:as per hPI      Assessment:  1. Mild intermittent asthma without complication     2. Allergic rhinitis, unspecified seasonality, unspecified trigger     3. Nocturnal hypoxia         Plan:  1.  Symptoms are well controlled on as needed antihistamine and Advair 100.  We will continue this regimen and plan on follow-up in 6 months or sooner if  necessary.  Patient is up-to-date on vaccines.  2.  Symptoms are improved and I suspect it was some seasonal variation.  We discussed intranasal steroids at night should symptoms resume and over-the-counter, second-generation antihistamine.  3.  As per my last note, not sure how reliable the studies are currently patient denies symptoms of excessive daytime sleepiness.  I have low suspicion for clinically significant EMIR.  Continue observation.  Return in about 6 months (around 2/27/2021) for asthma.

## 2020-12-24 RX ORDER — MONTELUKAST SODIUM 10 MG/1
TABLET ORAL
Qty: 90 TAB | Refills: 3 | Status: SHIPPED | OUTPATIENT
Start: 2020-12-24 | End: 2021-12-16

## 2021-02-19 NOTE — TELEPHONE ENCOUNTER
Received request via: Pharmacy    Was the patient seen in the last year in this department? Yes    Does the patient have an active prescription (recently filled or refills available) for medication(s) requested?  Pateint requesting a 90 day supply

## 2021-05-20 NOTE — TELEPHONE ENCOUNTER
Have we ever prescribed this med? Yes.  If yes, what date? 05/27/20    Last OV: 08/27/20 with Dr Garner   Return in about 6 months (around 2/27/2021) for asthma      Next OV: No Pending appt.      DX: Mild intermittent asthma without complication       Medications:   Requested Prescriptions     Pending Prescriptions Disp Refills   • WIXELA INHUB 100-50 MCG/DOSE AEROSOL POWDER, BREATH ACTIVATED [Pharmacy Med Name: WIXELA INHUB DISKUS 100/50MCG 60S] 60 Each      Sig: INHALE 1 PUFF BY MOUTH TWICE DAILY. RINSE MOUTH AFTER EACH USE

## 2021-11-16 DIAGNOSIS — J45.20 MILD INTERMITTENT ASTHMA WITHOUT COMPLICATION: ICD-10-CM

## 2021-11-16 NOTE — TELEPHONE ENCOUNTER
Caller Name: Ezio Mora                 Call Back Number: [phone] (home)         Patient approves a detailed voicemail message: N\A    Have we ever prescribed this med? Yes.  If yes, what date? 5/20/21     Last OV: 8/27/2020 Dr. Garner   Return in about 6 months (around 2/27/2021) for asthma  Next OV: none     DX:    Mild intermittent asthma without complication         Medications:  Current Outpatient Medications   Medication Sig Dispense Refill   • WIXELA INHUB 100-50 MCG/DOSE AEROSOL POWDER, BREATH ACTIVATED INHALE 1 PUFF BY MOUTH TWICE DAILY. RINSE MOUTH AFTER EACH USE 60 Each 5   • DILTIAZem (CARDIZEM) 30 MG Tab TAKE 1 TABLET BY MOUTH TWICE DAILY 180 tablet 3   • montelukast (SINGULAIR) 10 MG Tab TAKE 1 TABLET BY MOUTH EVERY DAY 90 Tab 3   • traZODone (DESYREL) 50 MG Tab Take 1 Tab by mouth every bedtime. (Patient not taking: Reported on 5/27/2020) 30 Tab 3   • diclofenac EC (VOLTAREN) 75 MG Tablet Delayed Response Take 1 Tab by mouth 2 times a day. 60 Tab 3   • albuterol (PROAIR HFA) 108 (90 Base) MCG/ACT Aero Soln inhalation aerosol Inhale 2 Puffs by mouth every 6 hours as needed. 8.5 g 3     No current facility-administered medications for this visit.

## 2021-12-16 RX ORDER — MONTELUKAST SODIUM 10 MG/1
TABLET ORAL
Qty: 90 TABLET | Refills: 3 | Status: SHIPPED | OUTPATIENT
Start: 2021-12-16 | End: 2023-03-13

## 2021-12-17 ENCOUNTER — PATIENT MESSAGE (OUTPATIENT)
Dept: MEDICAL GROUP | Facility: MEDICAL CENTER | Age: 71
End: 2021-12-17

## 2021-12-17 DIAGNOSIS — J45.20 MILD INTERMITTENT ASTHMA WITHOUT COMPLICATION: ICD-10-CM

## 2021-12-27 ENCOUNTER — PATIENT MESSAGE (OUTPATIENT)
Dept: MEDICAL GROUP | Facility: MEDICAL CENTER | Age: 71
End: 2021-12-27

## 2021-12-27 DIAGNOSIS — Z12.11 COLON CANCER SCREENING: ICD-10-CM

## 2022-01-31 ENCOUNTER — OFFICE VISIT (OUTPATIENT)
Dept: SLEEP MEDICINE | Facility: MEDICAL CENTER | Age: 72
End: 2022-01-31
Payer: MEDICARE

## 2022-01-31 VITALS
DIASTOLIC BLOOD PRESSURE: 70 MMHG | RESPIRATION RATE: 16 BRPM | SYSTOLIC BLOOD PRESSURE: 122 MMHG | HEART RATE: 86 BPM | WEIGHT: 149 LBS | OXYGEN SATURATION: 91 % | BODY MASS INDEX: 29.25 KG/M2 | HEIGHT: 60 IN

## 2022-01-31 DIAGNOSIS — J45.20 MILD INTERMITTENT ASTHMA WITHOUT COMPLICATION: ICD-10-CM

## 2022-01-31 DIAGNOSIS — Z78.9 NONSMOKER: ICD-10-CM

## 2022-01-31 DIAGNOSIS — J45.40 MODERATE PERSISTENT ASTHMA WITHOUT COMPLICATION: ICD-10-CM

## 2022-01-31 DIAGNOSIS — G47.30 SLEEP-DISORDERED BREATHING: ICD-10-CM

## 2022-01-31 PROBLEM — E66.9 OBESITY (BMI 30-39.9): Status: RESOLVED | Noted: 2017-08-28 | Resolved: 2022-01-31

## 2022-01-31 PROCEDURE — 99214 OFFICE O/P EST MOD 30 MIN: CPT | Performed by: NURSE PRACTITIONER

## 2022-01-31 RX ORDER — DILTIAZEM HYDROCHLORIDE 120 MG/1
CAPSULE, COATED, EXTENDED RELEASE ORAL
COMMUNITY
Start: 2022-01-11 | End: 2022-08-23

## 2022-01-31 RX ORDER — LOSARTAN POTASSIUM 25 MG/1
25 TABLET ORAL DAILY
COMMUNITY
End: 2023-08-08 | Stop reason: SDUPTHER

## 2022-01-31 NOTE — PATIENT INSTRUCTIONS
Continue Advair 1 puff twice daily, singulair qhs.  Start Claritin daily and continue nasal spray twice daily.    Home sleep study now; pending results will discuss therapy; please call for results

## 2022-01-31 NOTE — PROGRESS NOTES
"Chief Complaint   Patient presents with   • Follow-Up     reactive airway disease // last seen 8/27/2020        HPI:  Ezio Mora is a 71 y.o. year old female here today for follow-up on moderate persistent asthma.  Last office visit 8/27/2020 with Dr. Garner    Today she notes difficulty taking deep breaths and some recent hoarseness with occasional cough.  She continues her nasal spray twice daily.  She is no longer taking Claritin daily.  She rarely uses CORTEZ.  She denies cough after eating or drinking.  She denies acid reflux.  She notes random times of feeling short of breath with some recent episodes of tachycardia.  She is pending follow-up with cardiology tomorrow.  She notes rarely feeling short of breath at night needing to sit up to catch her breath.  She will wake feeling tired after 1 hour of being up with shortness of breath.  She generally visits the restroom 1 time at night.  She denies waking with headache or dizziness.  She generally goes to bed by 10 PM and will wake by 1 AM and feel \"wide-awake\" and may watch some television to resume sleep.  Prior overnight oximetry in 2019 noted cyclical desaturations with persistent low oxygen levels.  I reviewed this in detail with patient her current symptoms that may be indicating untreated sleep apnea.    Pulmonary history:  Never smoker  PFT 8/27/2028 noted FEV1 FVC within normal limits with mild restriction with FVC reduced at 83%.  Overall normal limits.  FVC 2.15 L or 83%, FEV1 1.88 L or 95%, FEV1/FVC ratio 88, %, TLC 89% DLCO 135% predicted.  No significant bronchodilator spots.  Currently using Wixela 100/50 mcg 1 puff twice daily, Singulair nightly and rare use of ProAir HFA inhaler.  CT chest 10/6/2016 indicates multiple stable pulmonary nodules, largest measuring 5 mm in right lower lobe.  Echo 6/25/2021 indicated EF 60 to 65%, normal diastolic function, RVSP P unable to be calculated, mild to moderate aortic regurgitation.  No " pericardial effusion.    ROS: As per HPI and otherwise negative if not stated.    Past Medical History:   Diagnosis Date   • ASTHMA    • Chronic fatigue 9/28/2016   • Degenerative arthritis of spine    • Mild intermittent asthma with acute exacerbation 6/18/2015   • Post-menopause on HRT (hormone replacement therapy)    • Pulmonary nodule 6/28/2016    Pulmonary, stable CT 8/15   • Rheumatic fever    • Weight gain 6/28/2016       Past Surgical History:   Procedure Laterality Date   • FUSION, SPINE, LUMBAR, PLIF     • PRIMARY C SECTION     • TUBAL LIGATION         Family History   Problem Relation Age of Onset   • Other Mother         global decline due to age   • Heart Disease Father    • Thyroid Child        Social History     Socioeconomic History   • Marital status:      Spouse name: Not on file   • Number of children: Not on file   • Years of education: Not on file   • Highest education level: Not on file   Occupational History   • Not on file   Tobacco Use   • Smoking status: Never Smoker   • Smokeless tobacco: Never Used   Vaping Use   • Vaping Use: Never used   Substance and Sexual Activity   • Alcohol use: Yes     Alcohol/week: 0.6 - 1.2 oz     Types: 1 - 2 Glasses of wine per week     Comment: occ wine   • Drug use: No   • Sexual activity: Not on file   Other Topics Concern   • Not on file   Social History Narrative         Social Determinants of Health     Financial Resource Strain:    • Difficulty of Paying Living Expenses: Not on file   Food Insecurity:    • Worried About Running Out of Food in the Last Year: Not on file   • Ran Out of Food in the Last Year: Not on file   Transportation Needs:    • Lack of Transportation (Medical): Not on file   • Lack of Transportation (Non-Medical): Not on file   Physical Activity:    • Days of Exercise per Week: Not on file   • Minutes of Exercise per Session: Not on file   Stress:    • Feeling of Stress : Not on file   Social Connections:    • Frequency  of Communication with Friends and Family: Not on file   • Frequency of Social Gatherings with Friends and Family: Not on file   • Attends Mosque Services: Not on file   • Active Member of Clubs or Organizations: Not on file   • Attends Club or Organization Meetings: Not on file   • Marital Status: Not on file   Intimate Partner Violence:    • Fear of Current or Ex-Partner: Not on file   • Emotionally Abused: Not on file   • Physically Abused: Not on file   • Sexually Abused: Not on file   Housing Stability:    • Unable to Pay for Housing in the Last Year: Not on file   • Number of Places Lived in the Last Year: Not on file   • Unstable Housing in the Last Year: Not on file       Allergies as of 01/31/2022 - Reviewed 01/31/2022   Allergen Reaction Noted   • Lisinopril Unspecified 08/24/2021   • Terramycin [oxytetracycline]  05/18/2015        Vitals:  /70 (BP Location: Left arm, Patient Position: Sitting, BP Cuff Size: Adult)   Pulse 86   Resp 16   Ht 1.524 m (5')   Wt 67.6 kg (149 lb)   SpO2 91%     Current medications as of today   Current Outpatient Medications   Medication Sig Dispense Refill   • losartan (COZAAR) 25 MG Tab Take 25 mg by mouth every day.     • fluticasone-salmeterol (WIXELA INHUB) 100-50 MCG/DOSE AEROSOL POWDER, BREATH ACTIVATED Inhale 1 Puff 2 times a day. Rince mouth after use 1 Each 11   • montelukast (SINGULAIR) 10 MG Tab TAKE 1 TABLET BY MOUTH EVERY DAY 90 Tablet 3   • DILTIAZem (CARDIZEM) 30 MG Tab TAKE 1 TABLET BY MOUTH TWICE DAILY 180 tablet 3   • albuterol (PROAIR HFA) 108 (90 Base) MCG/ACT Aero Soln inhalation aerosol Inhale 2 Puffs by mouth every 6 hours as needed. 8.5 g 3   • DILTIAZem CD (CARDIZEM CD) 120 MG CAPSULE SR 24 HR      • vitamin D (VITAMIND D3) 1000 UNIT Tab Take 1,000 Units by mouth every day.       No current facility-administered medications for this visit.         Physical Exam:   Gen:           Alert and oriented, No apparent distress. Mood and affect  appropriate, normal interaction with examiner.  Eyes:          PERRL, EOM intact, sclere white, conjunctive moist.  Ears:          Not examined.   Hearing:     Grossly intact.  Nose:          Normal, no lesions or deformities.  Dentition:    Mask.  Oropharynx:   Mask.  Mallampati Classification: mask  Neck:        Supple, trachea midline, no masses.  Respiratory Effort: No intercostal retractions or use of accessory muscles.   Lung Auscultation:      Clear to auscultation bilaterally; no rales, rhonchi or wheezing.  CV:            Regular rate and rhythm. No murmurs, rubs or gallops.  Abd:           Not examined.   Lymphadenopathy: Not examined.  Gait and Station: Normal.  Digits and Nails: No clubbing, cyanosis, petechiae, or nodes.   Cranial Nerves: II-XII grossly intact.  Skin:        No rashes, lesions or ulcers noted.               Ext:           No cyanosis or edema.      Assessment:  1. Moderate persistent asthma without complication  Spirometry   2. BMI 29.0-29.9,adult     3. Nonsmoker     4. Sleep-disordered breathing  Overnight Home Sleep Study   5. Mild intermittent asthma without complication  fluticasone-salmeterol (WIXELA INHUB) 100-50 MCG/DOSE AEROSOL POWDER, BREATH ACTIVATED       Immunizations:    Flu:11/8/21  Pneumovax 23:2020  Prevnar 13:recommend  COVID-19: 9/1/21, 2/9/21, 1/12/21    Plan:  1.  Patient's asthma seems clinically stable with her current bronchodilators.  I am concerned of ongoing sinusitis.  We will add Claritin once daily to her sinus regimen.  She will continue intranasal steroid spray twice daily.  Advised using albuterol if feeling difficulty taking deep breath or chest tightness.  Update spirometry prior to next office visit  2.  Patient does have symptoms of sleep apnea.  We will complete a home sleep study through [facility].  Pending results we will pursue therapy.  3.  Encourage regular exercise and dietary changes for weight loss  4.  Follow-up with cardiology and primary  care for other health concerns  5.  Follow-up in 6 weeks to review all testing and symptoms, sooner if needed.  Patient will contact me sooner with any questions or concerns.    Please note that this dictation was created using voice recognition software. I have made every reasonable attempt to correct obvious errors, but it is possible there are errors of grammar and possibly content that I did not discover before finalizing the note.

## 2022-03-24 ENCOUNTER — HOSPITAL ENCOUNTER (OUTPATIENT)
Facility: MEDICAL CENTER | Age: 72
End: 2022-03-24
Attending: NURSE PRACTITIONER
Payer: MEDICARE

## 2022-03-24 ENCOUNTER — OFFICE VISIT (OUTPATIENT)
Dept: URGENT CARE | Facility: PHYSICIAN GROUP | Age: 72
End: 2022-03-24
Payer: MEDICARE

## 2022-03-24 VITALS
HEART RATE: 80 BPM | OXYGEN SATURATION: 97 % | BODY MASS INDEX: 28.96 KG/M2 | SYSTOLIC BLOOD PRESSURE: 118 MMHG | RESPIRATION RATE: 18 BRPM | HEIGHT: 60 IN | WEIGHT: 147.5 LBS | DIASTOLIC BLOOD PRESSURE: 68 MMHG | TEMPERATURE: 97.5 F

## 2022-03-24 DIAGNOSIS — R21 RASH AND NONSPECIFIC SKIN ERUPTION: ICD-10-CM

## 2022-03-24 PROBLEM — R07.89 CHEST PRESSURE: Status: ACTIVE | Noted: 2021-06-16

## 2022-03-24 PROBLEM — R42 LIGHTHEADEDNESS: Status: ACTIVE | Noted: 2021-06-16

## 2022-03-24 PROBLEM — I47.29 NSVT (NONSUSTAINED VENTRICULAR TACHYCARDIA) (HCC): Status: ACTIVE | Noted: 2021-07-20

## 2022-03-24 PROBLEM — I10 ESSENTIAL HYPERTENSION: Status: ACTIVE | Noted: 2021-07-20

## 2022-03-24 PROBLEM — R94.39 ABNORMAL STRESS TEST: Status: ACTIVE | Noted: 2022-02-01

## 2022-03-24 LAB — AMBIGUOUS DTTM AMBI4: NORMAL

## 2022-03-24 PROCEDURE — 87186 SC STD MICRODIL/AGAR DIL: CPT

## 2022-03-24 PROCEDURE — 87798 DETECT AGENT NOS DNA AMP: CPT

## 2022-03-24 PROCEDURE — 87205 SMEAR GRAM STAIN: CPT

## 2022-03-24 PROCEDURE — 87070 CULTURE OTHR SPECIMN AEROBIC: CPT

## 2022-03-24 PROCEDURE — 87077 CULTURE AEROBIC IDENTIFY: CPT

## 2022-03-24 PROCEDURE — 99213 OFFICE O/P EST LOW 20 MIN: CPT | Performed by: NURSE PRACTITIONER

## 2022-03-24 RX ORDER — METHYLPREDNISOLONE 4 MG/1
TABLET ORAL
Qty: 21 TABLET | Refills: 0 | Status: ON HOLD | OUTPATIENT
Start: 2022-03-24 | End: 2022-08-31

## 2022-03-24 RX ORDER — TRIAMCINOLONE ACETONIDE 1 MG/G
1 CREAM TOPICAL 2 TIMES DAILY
Qty: 80 G | Refills: 1 | Status: SHIPPED | OUTPATIENT
Start: 2022-03-24 | End: 2022-03-31

## 2022-03-24 ASSESSMENT — ENCOUNTER SYMPTOMS
NAUSEA: 0
CHILLS: 0
FEVER: 0
HEADACHES: 0
VOMITING: 0

## 2022-03-24 NOTE — PROGRESS NOTES
Subjective:     Ezio Mora is a 71 y.o. female who presents for Rash (Started on arms, spread to ankles, otc did not help, present 3 days )      HPI  Pt presents for evaluation of a new problem. Ezio is a pleasant 71 year old male who presents to  today with complaints of a rash that first presented on right upper arm three days ago. It then spread to his left upper arm, right calf and left calf. Her rash is itchy. She denies any pain or itching.  She states that prior to obtaining this rash she was outside working in her yard.  Negative for rash to torso, neck, back or upper thighs.  She has been using calamine lotion for the past 3 days which did not provide any relief of her itching or rash.  Her rash is progressively worsening.  Negative for fever, chills, nausea or vomiting.     Review of Systems   Constitutional: Negative for chills and fever.   Gastrointestinal: Negative for nausea and vomiting.   Skin: Positive for itching and rash.   Neurological: Negative for headaches.       PMH:   Past Medical History:   Diagnosis Date   • ASTHMA    • Chronic fatigue 9/28/2016   • Degenerative arthritis of spine    • Mild intermittent asthma with acute exacerbation 6/18/2015   • Post-menopause on HRT (hormone replacement therapy)    • Pulmonary nodule 6/28/2016    Pulmonary, stable CT 8/15   • Rheumatic fever    • Weight gain 6/28/2016     ALLERGIES:   Allergies   Allergen Reactions   • Lisinopril Unspecified   • Terramycin [Oxytetracycline]      Rash as a child      SURGHX:   Past Surgical History:   Procedure Laterality Date   • FUSION, SPINE, LUMBAR, PLIF     • PRIMARY C SECTION     • TUBAL LIGATION       SOCHX:   Social History     Socioeconomic History   • Marital status:    Tobacco Use   • Smoking status: Never Smoker   • Smokeless tobacco: Never Used   Vaping Use   • Vaping Use: Never used   Substance and Sexual Activity   • Alcohol use: Yes     Alcohol/week: 0.6 - 1.2 oz     Types: 1 - 2  Glasses of wine per week     Comment: occ wine   • Drug use: No   Social History Narrative         FH:   Family History   Problem Relation Age of Onset   • Other Mother         global decline due to age   • Heart Disease Father    • Thyroid Child          Objective:   /68 (BP Location: Left arm, Patient Position: Sitting, BP Cuff Size: Adult)   Pulse 80   Temp 36.4 °C (97.5 °F) (Temporal)   Resp 18   Ht 1.524 m (5')   Wt 66.9 kg (147 lb 8 oz)   SpO2 97%   BMI 28.81 kg/m²     Physical Exam  Vitals and nursing note reviewed.   Constitutional:       General: She is not in acute distress.     Appearance: Normal appearance. She is not ill-appearing.   HENT:      Head: Normocephalic and atraumatic.      Right Ear: External ear normal.      Left Ear: External ear normal.      Nose: No congestion or rhinorrhea.      Mouth/Throat:      Mouth: Mucous membranes are moist.   Eyes:      Extraocular Movements: Extraocular movements intact.      Pupils: Pupils are equal, round, and reactive to light.   Cardiovascular:      Rate and Rhythm: Normal rate and regular rhythm.      Pulses: Normal pulses.      Heart sounds: Normal heart sounds.   Pulmonary:      Effort: Pulmonary effort is normal.      Breath sounds: Normal breath sounds.   Abdominal:      General: Abdomen is flat. Bowel sounds are normal.      Palpations: Abdomen is soft.      Tenderness: There is no abdominal tenderness. There is no right CVA tenderness or left CVA tenderness.   Musculoskeletal:         General: Normal range of motion.      Cervical back: Normal range of motion and neck supple.   Skin:     General: Skin is warm and dry.      Capillary Refill: Capillary refill takes less than 2 seconds.      Findings: Erythema and rash present. Rash is papular, urticarial and vesicular. Rash is not crusting, macular, nodular, purpuric, pustular or scaling.      Comments: Positive for erythemic vesicular papular rash to bilateral upper arms and  bilateral lower legs.    Neurological:      General: No focal deficit present.      Mental Status: She is alert and oriented to person, place, and time. Mental status is at baseline.   Psychiatric:         Mood and Affect: Mood normal.         Behavior: Behavior normal.         Thought Content: Thought content normal.         Judgment: Judgment normal.         Assessment/Plan:   Assessment    1. Rash and nonspecific skin eruption  CULTURE WOUND W/ GRAM STAIN    methylPREDNISolone (MEDROL DOSEPAK) 4 MG Tablet Therapy Pack    triamcinolone acetonide (KENALOG) 0.1 % Cream     Wound culture ordered to evaluate for bacteria.  She will also be tested for herpes zoster.  Methylprednisone and triamcinolone cream sent to pharmacy for relief of itching.  She was encouraged to use Zyrtec daily as well as Benadryl at night for further relief of itching rash.  AVS handout given and reviewed with patient. Pt educated on red flags and when to seek treatment back in ER or UC.

## 2022-03-25 DIAGNOSIS — R21 RASH AND NONSPECIFIC SKIN ERUPTION: ICD-10-CM

## 2022-03-25 LAB
GRAM STN SPEC: NORMAL
SIGNIFICANT IND 70042: NORMAL
SITE SITE: NORMAL
SOURCE SOURCE: NORMAL

## 2022-03-27 LAB
BACTERIA WND AEROBE CULT: ABNORMAL
BACTERIA WND AEROBE CULT: ABNORMAL
GRAM STN SPEC: ABNORMAL
SIGNIFICANT IND 70042: ABNORMAL
SITE SITE: ABNORMAL
SOURCE SOURCE: ABNORMAL

## 2022-03-28 DIAGNOSIS — B95.8 STAPHYLOCOCCAL INFECTION OF SKIN: ICD-10-CM

## 2022-03-28 DIAGNOSIS — L08.9 STAPHYLOCOCCAL INFECTION OF SKIN: ICD-10-CM

## 2022-03-28 RX ORDER — SULFAMETHOXAZOLE AND TRIMETHOPRIM 800; 160 MG/1; MG/1
1 TABLET ORAL 2 TIMES DAILY
Qty: 14 TABLET | Refills: 0 | Status: SHIPPED | OUTPATIENT
Start: 2022-03-28 | End: 2022-04-04

## 2022-03-30 LAB
SPECIMEN SOURCE: NORMAL
VZV DNA SPEC QL NAA+PROBE: NOT DETECTED

## 2022-04-04 DIAGNOSIS — B95.8 STAPHYLOCOCCAL INFECTION OF SKIN: ICD-10-CM

## 2022-04-04 DIAGNOSIS — L08.9 STAPHYLOCOCCAL INFECTION OF SKIN: ICD-10-CM

## 2022-04-04 RX ORDER — CEFDINIR 300 MG/1
300 CAPSULE ORAL 2 TIMES DAILY
Qty: 20 CAPSULE | Refills: 0 | Status: SHIPPED | OUTPATIENT
Start: 2022-04-04 | End: 2022-04-14

## 2022-04-22 ENCOUNTER — PATIENT MESSAGE (OUTPATIENT)
Dept: MEDICAL GROUP | Facility: MEDICAL CENTER | Age: 72
End: 2022-04-22
Payer: MEDICARE

## 2022-04-22 DIAGNOSIS — Z12.11 COLON CANCER SCREENING: ICD-10-CM

## 2022-08-23 ENCOUNTER — OFFICE VISIT (OUTPATIENT)
Dept: MEDICAL GROUP | Facility: MEDICAL CENTER | Age: 72
End: 2022-08-23
Payer: MEDICARE

## 2022-08-23 VITALS
DIASTOLIC BLOOD PRESSURE: 70 MMHG | TEMPERATURE: 98.7 F | OXYGEN SATURATION: 96 % | RESPIRATION RATE: 16 BRPM | HEIGHT: 60 IN | BODY MASS INDEX: 27.61 KG/M2 | SYSTOLIC BLOOD PRESSURE: 130 MMHG | HEART RATE: 73 BPM | WEIGHT: 140.65 LBS

## 2022-08-23 DIAGNOSIS — Z12.11 COLON CANCER SCREENING: ICD-10-CM

## 2022-08-23 DIAGNOSIS — I10 ESSENTIAL HYPERTENSION: ICD-10-CM

## 2022-08-23 DIAGNOSIS — I47.29 NSVT (NONSUSTAINED VENTRICULAR TACHYCARDIA) (HCC): ICD-10-CM

## 2022-08-23 DIAGNOSIS — Z12.31 ENCOUNTER FOR SCREENING MAMMOGRAM FOR BREAST CANCER: ICD-10-CM

## 2022-08-23 DIAGNOSIS — J45.40 MODERATE PERSISTENT ASTHMA WITHOUT COMPLICATION: ICD-10-CM

## 2022-08-23 DIAGNOSIS — Z11.59 NEED FOR HEPATITIS C SCREENING TEST: ICD-10-CM

## 2022-08-23 DIAGNOSIS — F51.04 CHRONIC INSOMNIA: ICD-10-CM

## 2022-08-23 DIAGNOSIS — Z00.00 MEDICARE ANNUAL WELLNESS VISIT, SUBSEQUENT: ICD-10-CM

## 2022-08-23 DIAGNOSIS — E78.5 HYPERLIPIDEMIA, UNSPECIFIED HYPERLIPIDEMIA TYPE: ICD-10-CM

## 2022-08-23 PROBLEM — R07.89 CHEST PRESSURE: Status: RESOLVED | Noted: 2021-06-16 | Resolved: 2022-08-23

## 2022-08-23 PROBLEM — J41.0 SIMPLE CHRONIC BRONCHITIS (HCC): Status: RESOLVED | Noted: 2020-01-09 | Resolved: 2022-08-23

## 2022-08-23 PROBLEM — R42 LIGHTHEADEDNESS: Status: RESOLVED | Noted: 2021-06-16 | Resolved: 2022-08-23

## 2022-08-23 PROCEDURE — G0439 PPPS, SUBSEQ VISIT: HCPCS | Performed by: FAMILY MEDICINE

## 2022-08-23 RX ORDER — CALCIUM CARBONATE 260MG(650)
TABLET,CHEWABLE ORAL DAILY
COMMUNITY

## 2022-08-23 RX ORDER — IBUPROFEN 800 MG/1
TABLET ORAL
COMMUNITY
Start: 2022-06-03 | End: 2022-08-23

## 2022-08-23 RX ORDER — IBUPROFEN 600 MG/1
600 TABLET ORAL EVERY 6 HOURS PRN
COMMUNITY
Start: 2022-05-31 | End: 2022-08-23

## 2022-08-23 RX ORDER — TRIAMCINOLONE ACETONIDE 1 MG/G
CREAM TOPICAL
COMMUNITY
Start: 2022-03-24 | End: 2022-08-23

## 2022-08-23 RX ORDER — ONDANSETRON 4 MG/1
TABLET, FILM COATED ORAL
COMMUNITY
Start: 2022-06-03 | End: 2022-08-23

## 2022-08-23 RX ORDER — AMOXICILLIN 500 MG/1
TABLET, FILM COATED ORAL
COMMUNITY
Start: 2022-06-03 | End: 2022-08-23

## 2022-08-23 RX ORDER — HYDROCODONE BITARTRATE AND ACETAMINOPHEN 10; 325 MG/1; MG/1
1 TABLET ORAL EVERY 6 HOURS PRN
COMMUNITY
Start: 2022-06-03 | End: 2022-08-23

## 2022-08-23 RX ORDER — DILTIAZEM HYDROCHLORIDE 240 MG/1
240 CAPSULE, COATED, EXTENDED RELEASE ORAL DAILY
COMMUNITY
Start: 2022-07-22 | End: 2022-09-06

## 2022-08-23 RX ORDER — CEFDINIR 300 MG/1
CAPSULE ORAL
COMMUNITY
Start: 2022-04-05 | End: 2022-08-23

## 2022-08-23 RX ORDER — TRAZODONE HYDROCHLORIDE 50 MG/1
50 TABLET ORAL NIGHTLY
Qty: 30 TABLET | Refills: 3 | Status: SHIPPED | OUTPATIENT
Start: 2022-08-23 | End: 2023-02-15

## 2022-08-23 ASSESSMENT — ACTIVITIES OF DAILY LIVING (ADL): BATHING_REQUIRES_ASSISTANCE: 0

## 2022-08-23 ASSESSMENT — ENCOUNTER SYMPTOMS: GENERAL WELL-BEING: GOOD

## 2022-08-23 ASSESSMENT — PATIENT HEALTH QUESTIONNAIRE - PHQ9: CLINICAL INTERPRETATION OF PHQ2 SCORE: 0

## 2022-08-23 NOTE — PROGRESS NOTES
Chief Complaint   Patient presents with    Annual Exam       HPI:  Ezio Mora is a 71 y.o. here for Medicare Annual Wellness Visit     Patient Active Problem List    Diagnosis Date Noted    Abnormal stress test 02/01/2022    Essential hypertension 07/20/2021    NSVT (nonsustained ventricular tachycardia) ([facility]) 07/20/2021    Chest pressure 06/16/2021    Lightheadedness 06/16/2021    Simple chronic bronchitis ([facility]) 01/09/2020    Right hip pain 11/25/2019    PSVT (paroxysmal supraventricular tachycardia) ([facility]) 04/12/2018    Chronic nonseasonal allergic rhinitis due to pollen 04/12/2018    Anxiety 08/28/2017    Palpitations 08/07/2017    Blocked eustachian tube 12/30/2016    Chronic fatigue 09/28/2016    Pulmonary nodule 06/28/2016    Moderate persistent asthma without complication 10/28/2014       Current Outpatient Medications   Medication Sig Dispense Refill    DILTIAZem CD (CARDIZEM CD) 240 MG CAPSULE SR 24 HR       Magnesium Citrate 100 MG Tab Take  by mouth every day.      Calcium Carbonate 1500 (600 Ca) MG Tab Take  by mouth every day.      losartan (COZAAR) 25 MG Tab Take 25 mg by mouth every day.      vitamin D (VITAMIND D3) 1000 UNIT Tab Take 1,000 Units by mouth every day.      fluticasone-salmeterol (WIXELA INHUB) 100-50 MCG/DOSE AEROSOL POWDER, BREATH ACTIVATED Inhale 1 Puff 2 times a day. Rince mouth after use 1 Each 11    montelukast (SINGULAIR) 10 MG Tab TAKE 1 TABLET BY MOUTH EVERY DAY 90 Tablet 3    DILTIAZem (CARDIZEM) 30 MG Tab TAKE 1 TABLET BY MOUTH TWICE DAILY 180 tablet 3    albuterol (PROAIR HFA) 108 (90 Base) MCG/ACT Aero Soln inhalation aerosol Inhale 2 Puffs by mouth every 6 hours as needed. 8.5 g 3    methylPREDNISolone (MEDROL DOSEPAK) 4 MG Tablet Therapy Pack Follow schedule on package instructions. 21 Tablet 0     No current facility-administered medications for this visit.          Current supplements as per medication list.     Allergies: Lisinopril and Terramycin  [oxytetracycline]    Current social contact/activities:      She  reports that she has never smoked. She has never used smokeless tobacco. She reports current alcohol use of about 0.6 - 1.2 oz per week. She reports that she does not use drugs.  Counseling given: Not Answered      DPA/Advanced Directive:  Patient does not have an Advanced Directive.  A packet and workshop information was given on Advanced Directives.    ROS:    Gait: Uses no assistive device  Ostomy: No  Other tubes: No  Amputations: No  Chronic oxygen use: No  Last eye exam: 4/2022  Wears hearing aids: No   : Denies any urinary leakage during the last 6 months    Screening:    Depression Screening  Little interest or pleasure in doing things?  0 - not at all  Feeling down, depressed , or hopeless? 0 - not at all  Patient Health Questionnaire Score: 0     If depressive symptoms identified deferred to follow up visit unless specifically addressed in assessment and plan.    Interpretation of PHQ-9 Total Score   Score Severity   1-4 No Depression   5-9 Mild Depression   10-14 Moderate Depression   15-19 Moderately Severe Depression   20-27 Severe Depression    Screening for Cognitive Impairment  Three Minute Recall (daughter, heaven, mountain) 3/3    Vincent clock face with all 12 numbers and set the hands to show 10 past 11.  Yes    Cognitive concerns identified deferred for follow up unless specifically addressed in assessment and plan.    Fall Risk Assessment  Has the patient had two or more falls in the last year or any fall with injury in the last year?  No    Safety Assessment  Throw rugs on floor.  No  Handrails on all stairs.  No  Good lighting in all hallways.  Yes  Difficulty hearing.  No  Patient counseled about all safety risks that were identified.    Functional Assessment ADLs  Are there any barriers preventing you from cooking for yourself or meeting nutritional needs?  No.    Are there any barriers preventing you from driving safely or  obtaining transportation?  No.    Are there any barriers preventing you from using a telephone or calling for help?  No.    Are there any barriers preventing you from shopping?  No.    Are there any barriers preventing you from taking care of your own finances?  No.    Are there any barriers preventing you from managing your medications?  No.    Are there any barriers preventing you from showering, bathing or dressing yourself?  No.    Are you currently engaging in any exercise or physical activity?  Yes.     What is your perception of your health?  Good.      Health Maintenance Summary            Overdue - IMM HEP B VACCINE (1 of 3 - 3-dose series) Overdue - never done      No completion history exists for this topic.              Overdue - HEPATITIS C SCREENING (Once) Overdue - never done      No completion history exists for this topic.              Overdue - IMM ZOSTER VACCINES (1 of 2) Overdue - never done      No completion history exists for this topic.              Overdue - MAMMOGRAM (Yearly) Overdue since 4/4/2019 04/04/2018  FOREIGN FILM MAMMOGRAPHY     04/03/2018  MA-MAMMO SCREEN BILAT IMPLANTS SAMANTA CAD     09/14/2016  [id number]     10/28/2012  Done - banner             Ordered - COLORECTAL CANCER SCREENING (COLONOSCOPY - Every 10 Years) Ordered on 4/22/2022      10/28/2009  COLONOSCOPY (Done - Gastrointerologist Kwan)             Overdue - IMM PNEUMOCOCCAL VACCINE: 65+ Years (2 - PCV) Overdue since 2/10/2021      02/10/2020  Imm Admin: Pneumococcal polysaccharide vaccine (PPSV-23)             Ordered - Annual Pulmonary Function Test / Spirometry (Yearly) Ordered on 1/31/2022 08/27/2020  PULMONARY FUNCTION TESTS -Test requested: Complete Pulmonary Function Test             IMM INFLUENZA (1) Next due on 9/1/2022 11/08/2021  Imm Admin: Influenza Vaccine Adult HD     10/07/2020  Imm Admin: Influenza Vaccine Quad Inj (Pf)     09/27/2019  Imm Admin: Influenza Vaccine Adult HD      10/11/2018  Imm Admin: Influenza Vaccine Adult HD     10/06/2017  Imm Admin: Influenza Seasonal Injectable - Historical Data     Only the first 5 history entries have been loaded, but more history exists.            BONE DENSITY (Every 5 Years) Next due on 3/5/2025      03/05/2020  DS-BONE DENSITY STUDY (DEXA)             IMM DTaP/Tdap/Td Vaccine (3 - Td or Tdap) Next due on 11/12/2028 11/12/2018  Imm Admin: Tdap Vaccine     11/02/2012  Imm Admin: Tdap Vaccine             Annual Wellness Visit  Completed      08/14/2019  Subsequent Annual Wellness Visit - Includes PPPS ()     08/14/2019  Visit Dx: Encounter for Medicare annual wellness exam             COVID-19 Vaccine (Series Information) Completed      05/23/2022  Imm Admin: MODERNA SARS-COV-2 VACCINE (12+)     09/01/2021  Imm Admin: Moderna SARS-CoV-2 Vaccine     02/09/2021  Imm Admin: Moderna SARS-CoV-2 Vaccine     01/12/2021  Imm Admin: Moderna SARS-CoV-2 Vaccine             IMM MENINGOCOCCAL ACWY VACCINE (Series Information) Aged Out      No completion history exists for this topic.              Discontinued - PAP SMEAR  Discontinued      05/28/2014  Done - [facility]                   Patient Care Team:  eKke Casas M.D. as PCP - General (Geriatrics)        Social History     Tobacco Use    Smoking status: Never    Smokeless tobacco: Never   Vaping Use    Vaping Use: Never used   Substance Use Topics    Alcohol use: Yes     Alcohol/week: 0.6 - 1.2 oz     Types: 1 - 2 Glasses of wine per week     Comment: occ wine    Drug use: No     Family History   Problem Relation Age of Onset    Other Mother         global decline due to age    Heart Disease Father     Thyroid Child      She  has a past medical history of ASTHMA, Chronic fatigue (9/28/2016), Degenerative arthritis of spine, Mild intermittent asthma with acute exacerbation (6/18/2015), Post-menopause on HRT (hormone replacement therapy), Pulmonary nodule (6/28/2016), Rheumatic fever,  and Weight gain (6/28/2016).   Past Surgical History:   Procedure Laterality Date    FUSION, SPINE, LUMBAR, PLIF      PRIMARY C SECTION      TUBAL LIGATION         Exam:   There were no vitals taken for this visit. There is no height or weight on file to calculate BMI.    Hearing excellent.    Dentition good  Alert, oriented in no acute distress.  Eye contact is good, speech goal directed, affect calm    Assessment and Plan. The following treatment and monitoring plan is recommended:    71 y.o. female     1. Moderate persistent asthma without complication  Stable.  Currently asymptomatic.  Continue on Wixela inhaler twice a day and albuterol as needed.    - PULMONARY FUNCTION TESTS -Test requested: Complete Pulmonary Function Test; Future  - Subsequent Annual Wellness Visit - Includes PPPS ()    2. Essential hypertension  Controlled.  Continue losartan 25 mg daily, and diltiazem  mg daily    - CBC WITH DIFFERENTIAL; Future  - Comp Metabolic Panel; Future  - Lipid Profile; Future  - TSH; Future  - Subsequent Annual Wellness Visit - Includes PPPS ()    3. NSVT (nonsustained ventricular tachycardia) (HCC)  Currently asymptomatic.  Heart rate has been was increased recently by cardiologist to to 40 mg daily    - Subsequent Annual Wellness Visit - Includes PPPS ()    4. Encounter for screening mammogram for breast cancer    - MA-SCREENING MAMMO BILAT W/CAD; Future  - Subsequent Annual Wellness Visit - Includes PPPS ()    5. Colon cancer screening    - Referral to GI for Colonoscopy  - Subsequent Annual Wellness Visit - Includes PPPS ()    6. Chronic insomnia  She has been having a problem falling and staying asleep.  She has been going through some life stressor, however denies any depression or anxiety.  We will try trazodone 50 mg nightly    - traZODone (DESYREL) 50 MG Tab; Take 1 Tablet by mouth every evening.  Dispense: 30 Tablet; Refill: 3  - Subsequent Annual Wellness Visit - Includes  PPPS ()    7. Hyperlipidemia, unspecified hyperlipidemia type  Last lipid panel was checked in 2020 showed:  Cholesterol,Tot 100 - 199 mg/dL 245 High   245 High   219 High   239 High     Triglycerides 0 - 149 mg/dL 48  53  52  72    HDL >=40 mg/dL 106  91  89  96    LDL <100 mg/dL 129 High   143 High   120 High   129 High     Resulting Agency  M        Patient is counseled on lifestyle modification.  We will repeat lipid panel    - Lipid Profile; Future  - TSH; Future  - Subsequent Annual Wellness Visit - Includes PPPS ()    8. Need for hepatitis C screening test    - HEP C VIRUS ANTIBODY; Future  - Subsequent Annual Wellness Visit - Includes PPPS ()    9. Medicare annual wellness visit, subsequent  Preventive measures and chronic medical history reviewed.    - Subsequent Annual Wellness Visit - Includes PPPS ()          Services suggested: No services needed at this time  Health Care Screening: Age-appropriate preventive services recommended by USPTF and ACIP covered by Medicare were discussed today. Services ordered if indicated and agreed upon by the patient.  Referrals offered: Community-based lifestyle interventions to reduce health risks and promote self-management and wellness, fall prevention, nutrition, physical activity, tobacco-use cessation, weight loss, and mental health services as per orders if indicated.    Discussion today about general wellness and lifestyle habits:    Prevent falls and reduce trip hazards; Cautioned about securing or removing rugs.  Have a working fire alarm and carbon monoxide detector;   Engage in regular physical activity and social activities     Follow-up: No follow-ups on file.    Annual Health Assessment Questions:    1.  Are you currently engaging in any exercise or physical activity? Yes    2.  How would you describe your mood or emotional well-being today? good    3.  Have you had any falls in the last year? No    4.  Have you noticed any problems  with your balance or had difficulty walking? No    5.  In the last six months have you experienced any leakage of urine? No    6. DPA/Advanced Directive: Patient does not have an Advanced Directive.  A packet and workshop information was given on Advanced Directives.

## 2022-08-29 ENCOUNTER — HOSPITAL ENCOUNTER (INPATIENT)
Facility: MEDICAL CENTER | Age: 72
LOS: 2 days | DRG: 066 | End: 2022-08-31
Attending: STUDENT IN AN ORGANIZED HEALTH CARE EDUCATION/TRAINING PROGRAM | Admitting: INTERNAL MEDICINE
Payer: MEDICARE

## 2022-08-29 ENCOUNTER — HOSPITAL ENCOUNTER (OUTPATIENT)
Dept: RADIOLOGY | Facility: MEDICAL CENTER | Age: 72
End: 2022-08-29
Attending: FAMILY MEDICINE
Payer: MEDICARE

## 2022-08-29 DIAGNOSIS — I63.9 ACUTE CVA (CEREBROVASCULAR ACCIDENT) (HCC): ICD-10-CM

## 2022-08-29 PROCEDURE — 99223 1ST HOSP IP/OBS HIGH 75: CPT | Mod: AI,GC | Performed by: STUDENT IN AN ORGANIZED HEALTH CARE EDUCATION/TRAINING PROGRAM

## 2022-08-29 PROCEDURE — A9270 NON-COVERED ITEM OR SERVICE: HCPCS | Performed by: STUDENT IN AN ORGANIZED HEALTH CARE EDUCATION/TRAINING PROGRAM

## 2022-08-29 PROCEDURE — 700102 HCHG RX REV CODE 250 W/ 637 OVERRIDE(OP): Performed by: STUDENT IN AN ORGANIZED HEALTH CARE EDUCATION/TRAINING PROGRAM

## 2022-08-29 PROCEDURE — 770020 HCHG ROOM/CARE - TELE (206)

## 2022-08-29 RX ORDER — POLYETHYLENE GLYCOL 3350 17 G/17G
1 POWDER, FOR SOLUTION ORAL
Status: DISCONTINUED | OUTPATIENT
Start: 2022-08-29 | End: 2022-08-31 | Stop reason: HOSPADM

## 2022-08-29 RX ORDER — AMOXICILLIN 250 MG
2 CAPSULE ORAL 2 TIMES DAILY
Status: DISCONTINUED | OUTPATIENT
Start: 2022-08-30 | End: 2022-08-31 | Stop reason: HOSPADM

## 2022-08-29 RX ORDER — LABETALOL HYDROCHLORIDE 5 MG/ML
10 INJECTION, SOLUTION INTRAVENOUS
Status: DISCONTINUED | OUTPATIENT
Start: 2022-08-29 | End: 2022-08-30

## 2022-08-29 RX ORDER — ACETAMINOPHEN 325 MG/1
650 TABLET ORAL EVERY 6 HOURS PRN
Status: DISCONTINUED | OUTPATIENT
Start: 2022-08-29 | End: 2022-08-31 | Stop reason: HOSPADM

## 2022-08-29 RX ORDER — HYDRALAZINE HYDROCHLORIDE 20 MG/ML
10 INJECTION INTRAMUSCULAR; INTRAVENOUS
Status: DISCONTINUED | OUTPATIENT
Start: 2022-08-29 | End: 2022-08-30

## 2022-08-29 RX ORDER — BISACODYL 10 MG
10 SUPPOSITORY, RECTAL RECTAL
Status: DISCONTINUED | OUTPATIENT
Start: 2022-08-29 | End: 2022-08-31 | Stop reason: HOSPADM

## 2022-08-29 RX ORDER — ATORVASTATIN CALCIUM 80 MG/1
80 TABLET, FILM COATED ORAL EVERY EVENING
Status: DISCONTINUED | OUTPATIENT
Start: 2022-08-29 | End: 2022-08-31 | Stop reason: HOSPADM

## 2022-08-29 RX ADMIN — ATORVASTATIN CALCIUM 80 MG: 80 TABLET, FILM COATED ORAL at 22:43

## 2022-08-29 ASSESSMENT — ENCOUNTER SYMPTOMS
EYE DISCHARGE: 0
VOMITING: 0
DIZZINESS: 0
BACK PAIN: 0
PALPITATIONS: 0
DIAPHORESIS: 0
CONSTIPATION: 0
SORE THROAT: 0
WEAKNESS: 0
HEADACHES: 1
TREMORS: 0
SPEECH CHANGE: 0
NAUSEA: 0
ABDOMINAL PAIN: 0
CHILLS: 0
DIARRHEA: 0
TINGLING: 0
BLURRED VISION: 0
PHOTOPHOBIA: 0
EYE REDNESS: 0
FEVER: 0
EYE PAIN: 0
COUGH: 0
NECK PAIN: 0
DOUBLE VISION: 0
SHORTNESS OF BREATH: 0
BLOOD IN STOOL: 0

## 2022-08-29 ASSESSMENT — LIFESTYLE VARIABLES
TOTAL SCORE: 0
ALCOHOL_USE: NO
CONSUMPTION TOTAL: NEGATIVE
EVER HAD A DRINK FIRST THING IN THE MORNING TO STEADY YOUR NERVES TO GET RID OF A HANGOVER: NO
TOTAL SCORE: 0
ON A TYPICAL DAY WHEN YOU DRINK ALCOHOL HOW MANY DRINKS DO YOU HAVE: 1
TOTAL SCORE: 0
EVER FELT BAD OR GUILTY ABOUT YOUR DRINKING: NO
HAVE PEOPLE ANNOYED YOU BY CRITICIZING YOUR DRINKING: NO
AVERAGE NUMBER OF DAYS PER WEEK YOU HAVE A DRINK CONTAINING ALCOHOL: 4
DOES PATIENT WANT TO STOP DRINKING: CANNOT ASSESS
HAVE YOU EVER FELT YOU SHOULD CUT DOWN ON YOUR DRINKING: NO
HOW MANY TIMES IN THE PAST YEAR HAVE YOU HAD 5 OR MORE DRINKS IN A DAY: 0

## 2022-08-29 ASSESSMENT — PATIENT HEALTH QUESTIONNAIRE - PHQ9
1. LITTLE INTEREST OR PLEASURE IN DOING THINGS: NOT AT ALL
2. FEELING DOWN, DEPRESSED, IRRITABLE, OR HOPELESS: NOT AT ALL
SUM OF ALL RESPONSES TO PHQ9 QUESTIONS 1 AND 2: 0

## 2022-08-30 ENCOUNTER — HOSPITAL ENCOUNTER (OUTPATIENT)
Dept: RADIOLOGY | Facility: MEDICAL CENTER | Age: 72
End: 2022-08-30

## 2022-08-30 ENCOUNTER — APPOINTMENT (OUTPATIENT)
Dept: RADIOLOGY | Facility: MEDICAL CENTER | Age: 72
DRG: 066 | End: 2022-08-30
Payer: MEDICARE

## 2022-08-30 ENCOUNTER — APPOINTMENT (OUTPATIENT)
Dept: CARDIOLOGY | Facility: MEDICAL CENTER | Age: 72
DRG: 066 | End: 2022-08-30
Payer: MEDICARE

## 2022-08-30 PROBLEM — J45.909 ASTHMA: Status: ACTIVE | Noted: 2022-08-30

## 2022-08-30 LAB
ALBUMIN SERPL BCP-MCNC: 4.3 G/DL (ref 3.2–4.9)
ALBUMIN/GLOB SERPL: 1.7 G/DL
ALP SERPL-CCNC: 67 U/L (ref 30–99)
ALT SERPL-CCNC: 14 U/L (ref 2–50)
ANION GAP SERPL CALC-SCNC: 11 MMOL/L (ref 7–16)
AST SERPL-CCNC: 14 U/L (ref 12–45)
BASOPHILS # BLD AUTO: 0.3 % (ref 0–1.8)
BASOPHILS # BLD: 0.02 K/UL (ref 0–0.12)
BILIRUB SERPL-MCNC: 0.5 MG/DL (ref 0.1–1.5)
BUN SERPL-MCNC: 11 MG/DL (ref 8–22)
CALCIUM SERPL-MCNC: 9.2 MG/DL (ref 8.5–10.5)
CHLORIDE SERPL-SCNC: 105 MMOL/L (ref 96–112)
CHOLEST SERPL-MCNC: 226 MG/DL (ref 100–199)
CO2 SERPL-SCNC: 23 MMOL/L (ref 20–33)
CREAT SERPL-MCNC: 0.6 MG/DL (ref 0.5–1.4)
EOSINOPHIL # BLD AUTO: 0.52 K/UL (ref 0–0.51)
EOSINOPHIL NFR BLD: 7.7 % (ref 0–6.9)
ERYTHROCYTE [DISTWIDTH] IN BLOOD BY AUTOMATED COUNT: 46.5 FL (ref 35.9–50)
EST. AVERAGE GLUCOSE BLD GHB EST-MCNC: 114 MG/DL
GFR SERPLBLD CREATININE-BSD FMLA CKD-EPI: 96 ML/MIN/1.73 M 2
GLOBULIN SER CALC-MCNC: 2.5 G/DL (ref 1.9–3.5)
GLUCOSE SERPL-MCNC: 100 MG/DL (ref 65–99)
HBA1C MFR BLD: 5.6 % (ref 4–5.6)
HCT VFR BLD AUTO: 42 % (ref 37–47)
HDLC SERPL-MCNC: 103 MG/DL
HGB BLD-MCNC: 14.2 G/DL (ref 12–16)
IMM GRANULOCYTES # BLD AUTO: 0.02 K/UL (ref 0–0.11)
IMM GRANULOCYTES NFR BLD AUTO: 0.3 % (ref 0–0.9)
LDLC SERPL CALC-MCNC: 112 MG/DL
LV EJECT FRACT  99904: 60
LV EJECT FRACT MOD 2C 99903: 67.25
LV EJECT FRACT MOD 4C 99902: 65.29
LV EJECT FRACT MOD BP 99901: 64.89
LYMPHOCYTES # BLD AUTO: 1.8 K/UL (ref 1–4.8)
LYMPHOCYTES NFR BLD: 26.7 % (ref 22–41)
MAGNESIUM SERPL-MCNC: 2.1 MG/DL (ref 1.5–2.5)
MCH RBC QN AUTO: 32.4 PG (ref 27–33)
MCHC RBC AUTO-ENTMCNC: 33.8 G/DL (ref 33.6–35)
MCV RBC AUTO: 95.9 FL (ref 81.4–97.8)
MONOCYTES # BLD AUTO: 0.61 K/UL (ref 0–0.85)
MONOCYTES NFR BLD AUTO: 9.1 % (ref 0–13.4)
NEUTROPHILS # BLD AUTO: 3.76 K/UL (ref 2–7.15)
NEUTROPHILS NFR BLD: 55.9 % (ref 44–72)
NRBC # BLD AUTO: 0 K/UL
NRBC BLD-RTO: 0 /100 WBC
PLATELET # BLD AUTO: 324 K/UL (ref 164–446)
PMV BLD AUTO: 8.9 FL (ref 9–12.9)
POTASSIUM SERPL-SCNC: 4 MMOL/L (ref 3.6–5.5)
PROT SERPL-MCNC: 6.8 G/DL (ref 6–8.2)
RBC # BLD AUTO: 4.38 M/UL (ref 4.2–5.4)
SODIUM SERPL-SCNC: 139 MMOL/L (ref 135–145)
TRIGL SERPL-MCNC: 56 MG/DL (ref 0–149)
WBC # BLD AUTO: 6.7 K/UL (ref 4.8–10.8)

## 2022-08-30 PROCEDURE — 700102 HCHG RX REV CODE 250 W/ 637 OVERRIDE(OP)

## 2022-08-30 PROCEDURE — 80053 COMPREHEN METABOLIC PANEL: CPT

## 2022-08-30 PROCEDURE — 97535 SELF CARE MNGMENT TRAINING: CPT

## 2022-08-30 PROCEDURE — 83036 HEMOGLOBIN GLYCOSYLATED A1C: CPT

## 2022-08-30 PROCEDURE — 70498 CT ANGIOGRAPHY NECK: CPT

## 2022-08-30 PROCEDURE — 700111 HCHG RX REV CODE 636 W/ 250 OVERRIDE (IP)

## 2022-08-30 PROCEDURE — A9270 NON-COVERED ITEM OR SERVICE: HCPCS

## 2022-08-30 PROCEDURE — 99223 1ST HOSP IP/OBS HIGH 75: CPT | Mod: GC | Performed by: PSYCHIATRY & NEUROLOGY

## 2022-08-30 PROCEDURE — 93306 TTE W/DOPPLER COMPLETE: CPT

## 2022-08-30 PROCEDURE — A9270 NON-COVERED ITEM OR SERVICE: HCPCS | Performed by: STUDENT IN AN ORGANIZED HEALTH CARE EDUCATION/TRAINING PROGRAM

## 2022-08-30 PROCEDURE — 80061 LIPID PANEL: CPT

## 2022-08-30 PROCEDURE — 99232 SBSQ HOSP IP/OBS MODERATE 35: CPT | Mod: GC | Performed by: INTERNAL MEDICINE

## 2022-08-30 PROCEDURE — 70496 CT ANGIOGRAPHY HEAD: CPT

## 2022-08-30 PROCEDURE — 83735 ASSAY OF MAGNESIUM: CPT

## 2022-08-30 PROCEDURE — 93306 TTE W/DOPPLER COMPLETE: CPT | Mod: 26 | Performed by: INTERNAL MEDICINE

## 2022-08-30 PROCEDURE — 700102 HCHG RX REV CODE 250 W/ 637 OVERRIDE(OP): Performed by: STUDENT IN AN ORGANIZED HEALTH CARE EDUCATION/TRAINING PROGRAM

## 2022-08-30 PROCEDURE — 770020 HCHG ROOM/CARE - TELE (206)

## 2022-08-30 PROCEDURE — 700117 HCHG RX CONTRAST REV CODE 255

## 2022-08-30 PROCEDURE — 85025 COMPLETE CBC W/AUTO DIFF WBC: CPT

## 2022-08-30 RX ORDER — LOSARTAN POTASSIUM 50 MG/1
25 TABLET ORAL DAILY
Status: DISCONTINUED | OUTPATIENT
Start: 2022-08-30 | End: 2022-08-31 | Stop reason: HOSPADM

## 2022-08-30 RX ORDER — DILTIAZEM HYDROCHLORIDE 240 MG/1
240 CAPSULE, COATED, EXTENDED RELEASE ORAL DAILY
Status: DISCONTINUED | OUTPATIENT
Start: 2022-08-30 | End: 2022-08-31 | Stop reason: HOSPADM

## 2022-08-30 RX ORDER — ENOXAPARIN SODIUM 100 MG/ML
30 INJECTION SUBCUTANEOUS DAILY
Status: DISCONTINUED | OUTPATIENT
Start: 2022-08-30 | End: 2022-08-31 | Stop reason: HOSPADM

## 2022-08-30 RX ADMIN — DOCUSATE SODIUM 50 MG AND SENNOSIDES 8.6 MG 2 TABLET: 8.6; 5 TABLET, FILM COATED ORAL at 06:05

## 2022-08-30 RX ADMIN — ENOXAPARIN SODIUM 30 MG: 30 INJECTION SUBCUTANEOUS at 17:14

## 2022-08-30 RX ADMIN — LOSARTAN POTASSIUM 25 MG: 50 TABLET, FILM COATED ORAL at 12:54

## 2022-08-30 RX ADMIN — ATORVASTATIN CALCIUM 80 MG: 80 TABLET, FILM COATED ORAL at 17:14

## 2022-08-30 RX ADMIN — DILTIAZEM HYDROCHLORIDE 240 MG: 240 CAPSULE, COATED, EXTENDED RELEASE ORAL at 10:42

## 2022-08-30 RX ADMIN — IOHEXOL 100 ML: 350 INJECTION, SOLUTION INTRAVENOUS at 16:15

## 2022-08-30 RX ADMIN — ASPIRIN 81 MG: 81 TABLET, COATED ORAL at 06:05

## 2022-08-30 ASSESSMENT — COGNITIVE AND FUNCTIONAL STATUS - GENERAL
MOBILITY SCORE: 24
DAILY ACTIVITIY SCORE: 24
SUGGESTED CMS G CODE MODIFIER MOBILITY: CH
SUGGESTED CMS G CODE MODIFIER DAILY ACTIVITY: CH

## 2022-08-30 ASSESSMENT — ENCOUNTER SYMPTOMS
SPEECH CHANGE: 0
MYALGIAS: 0
SHORTNESS OF BREATH: 0
TINGLING: 0
DIZZINESS: 0
TREMORS: 0
FOCAL WEAKNESS: 0
CHILLS: 0
ABDOMINAL PAIN: 0
NAUSEA: 0
BLURRED VISION: 0
PALPITATIONS: 0
NERVOUS/ANXIOUS: 0
VOMITING: 0
FEVER: 0
HEADACHES: 0
SENSORY CHANGE: 0

## 2022-08-30 ASSESSMENT — PAIN DESCRIPTION - PAIN TYPE: TYPE: ACUTE PAIN

## 2022-08-30 NOTE — PROGRESS NOTES
[facility] Daily Progress Note    Date of Service  8/30/2022    [facility] Team: [facility] Purple Team   Attending: Chiki Whitaker M.d.  Senior Resident: Dr. Adames  Intern:  Dr. Paredes  Contact Number: [phone]    Chief Complaint  Ezio Mora is a 71 y.o. female admitted 8/29/2022 with right sided hemianopsia from [facility]  Ms. Ezio Mora is a 72 yo female with pmhx of HTN, asthma, and SVT who presented with right hemianopsia from [facility]. She states she normally gets a headache and has floaters and sees wavy lines which usually go away after 10 minutes. On Sun at 11:30 she saw some dark spots appear then complete blackout of her right peripheral vision. 4:30 pm she had a headache attributing it to sinus problems and took tylenol x2 with relief of the headache. Peripheral vision loss did not resolve and she went to see her optometrist 08/29 0900 who dilated her eyes and determined that she needed to go the ED for possible stroke. CT head was negative for acute abnormality, showed severe calcification of L vertebral artery. MRI brain w/wo contrast with L occipital lobe infract with petechial hemorrhage. She received ASA 324mg x 1 and transferred to [facility] for direct admission for evaluation by Neurology.  8/30 Neurology recommendations for CT-CTA head and neck and Echo    Interval Problem Update  This AM, pt comfortable in bed states she has no further neurological symptoms aside from right peripheral vision loss that has not returned. No other complaints. Able to ambulate on her own without focal weakness.     Neurology recommendation for further workup with CT-CTA head and neck and ECHO    I have discussed this patient's plan of care and discharge plan at IDT rounds today with Case Management, Nursing, Nursing leadership, and other members of the IDT team.    Consultants/Specialty  neurology    Code Status  Full Code    Disposition  Patient is not  medically cleared for discharge.   Anticipate discharge to to home with close outpatient follow-up.  I have placed the appropriate orders for post-discharge needs.    Review of Systems  Review of Systems   Constitutional:  Negative for chills, fever and malaise/fatigue.   HENT:  Negative for hearing loss.    Eyes:  Negative for blurred vision.        Right peripheral vision loss   Respiratory:  Negative for shortness of breath.    Cardiovascular:  Negative for chest pain and palpitations.        SVT history   Gastrointestinal:  Negative for abdominal pain, nausea and vomiting.   Genitourinary:  Negative for dysuria.   Musculoskeletal:  Negative for myalgias.   Neurological:  Negative for dizziness, tingling, tremors, sensory change, speech change, focal weakness and headaches.   Endo/Heme/Allergies:         Asthma   Psychiatric/Behavioral:  The patient is not nervous/anxious.         Feels well      Physical Exam  Temp:  [36.7 °C (98.1 °F)-37.3 °C (99.1 °F)] 36.8 °C (98.2 °F)  Pulse:  [74-93] 93  Resp:  [12-17] 17  BP: (119-154)/(71-85) 153/85  SpO2:  [93 %-94 %] 94 %    Physical Exam  Constitutional:       General: She is not in acute distress.     Appearance: Normal appearance. She is not ill-appearing.   HENT:      Head: Normocephalic and atraumatic.      Right Ear: External ear normal.      Left Ear: External ear normal.      Nose: Nose normal.      Mouth/Throat:      Mouth: Mucous membranes are moist.   Eyes:      General: Visual field deficit present.      Extraocular Movements: Extraocular movements intact.      Conjunctiva/sclera: Conjunctivae normal.      Pupils: Pupils are equal, round, and reactive to light.   Cardiovascular:      Rate and Rhythm: Normal rate and regular rhythm.      Pulses: Normal pulses.      Heart sounds: Normal heart sounds.   Pulmonary:      Effort: Pulmonary effort is normal.      Breath sounds: Normal breath sounds.   Abdominal:      General: Abdomen is flat. Bowel sounds are  normal.      Palpations: Abdomen is soft.   Musculoskeletal:         General: Normal range of motion.      Cervical back: Normal range of motion and neck supple.   Skin:     General: Skin is warm and dry.   Neurological:      General: No focal deficit present.      Mental Status: She is alert and oriented to person, place, and time.      GCS: GCS eye subscore is 4. GCS verbal subscore is 5. GCS motor subscore is 6.      Cranial Nerves: Cranial nerve deficit and facial asymmetry present. No dysarthria.      Sensory: No sensory deficit.      Motor: No weakness.      Coordination: Coordination normal.      Deep Tendon Reflexes: Reflexes normal. Babinski sign absent on the right side. Babinski sign absent on the left side.      Reflex Scores:       Bicep reflexes are 2+ on the right side and 2+ on the left side.       Brachioradialis reflexes are 2+ on the right side and 2+ on the left side.       Patellar reflexes are 2+ on the right side and 2+ on the left side.     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 Left eye upper right quadrant quadrantanopia.    Psychiatric:         Mood and Affect: Mood normal.         Behavior: Behavior normal.         Thought Content: Thought content normal.         Judgment: Judgment normal.       Fluids  No intake or output data in the 24 hours ending 08/30/22 1543    Laboratory  Recent Labs     08/30/22  0309   WBC 6.7   RBC 4.38   HEMOGLOBIN 14.2   HEMATOCRIT 42.0   MCV 95.9   MCH 32.4   MCHC 33.8   RDW 46.5   PLATELETCT 324   MPV 8.9*     Recent Labs     08/30/22  0309   SODIUM 139   POTASSIUM 4.0   CHLORIDE 105   CO2 23   GLUCOSE 100*   BUN 11   CREATININE 0.60   CALCIUM 9.2             Recent Labs     08/30/22  0309   TRIGLYCERIDE 56      *       Imaging  OUTSIDE IMAGES-MR BRAIN   Final Result      OUTSIDE IMAGES-CT HEAD   Final Result      EC-ECHOCARDIOGRAM COMPLETE W/O CONT    (Results Pending)   CT-CTA  NECK WITH & W/O-POST PROCESSING    (Results Pending)   CT-CTA HEAD WITH & W/O-POST PROCESS    (Results Pending)        Assessment/Plan  Problem Representation:    * Acute CVA (cerebrovascular accident) (HCC)- (present on admission)  Assessment & Plan  MRI brain w/wo contrast showing L occipital lobe infarct with petechial hemorrhage. CT head showing severe calcification of L vertebral artery. ABCD2 score of 4. NIHSS score of 1. Neuro exam showing R lower quadrantic anopsia.  -Lipid panel elevated cholesterol and elevated LDL, HbA1c wnl  -Received ASA 324mg at OSH  -Continue ASA 81mg daily  -Started on atorvastatin 80mg daily  -Neurology consultation with recs for CTA Head and Neck, ECHO    Asthma  Assessment & Plan  -Holding home inhalers    Essential hypertension- (present on admission)  Assessment & Plan  Past 48hrs since onset of neurological symptoms, no longer need for permissive hypertension. Will continue home bp medication for bp control.  -continue home losartan 25mg    PSVT (paroxysmal supraventricular tachycardia) (HCC)- (present on admission)  Assessment & Plan  Diagnosed with tachycardia due to SVTs on Diltiazem 250mg, follows up regularly with cardiologist. Angiogram this past Jan/Feb with no significant findings.   -CTM, currently HR<100  -Continue home diltiazem          VTE prophylaxis: Enoxaparin    I have performed a physical exam and reviewed and updated ROS and Plan today (8/30/2022). In review of yesterday's note (8/29/2022), there are no changes except as documented above.

## 2022-08-30 NOTE — PROGRESS NOTES
"  Neurology Initial Consult H&P  Neurohospitalist Service, [facility]    History of present illness:    Mrs. Ezio Mora  is a 72 yo female with PMH of HTN, asthma, and SVT admitted for Acute CVA presenting with right sided hemianopsia. Patient is being referred to Neurology for Cranial MRI finding done in [facility] suggestive of left occipital lobe infarct per chart review from admitting service. (No MRI uploaded as of yet.)    Patient states that 3 years ago she started to experience occasional episodes of seeing wavy lines on her right eye accompanied by episodes of transient visual change described as \" seeing punched holes of visual field defect on right eye\". She states that these episodes would appear only rarely and would last 10-15 minutes at the most. This is occasionally accompanied by headache which would resolve with tylenol. No prior history of stroke, no body weakness, no changes in speech and other symptoms.     On 08/28/22 at 11:30 am patient experienced   loss of lateral peripheral visual field on right eye. Patient at that time had no headache, no nausea nor vomiting, no noted weakness on any part of the body, was coherent and no changes in her speech. This visual change persisted until she developed headache 4 hours after, which was subsequently resolved with intake of Tylenol. Patient was seen by her optometrist the next day where a dilated fundoscopic exam was done which was negative. Since then, the visual change on her right eye is constant with no other symptoms noted. Patient  was advised to go to ED for further work up where her NIHSS Score was noted to be 1. CT head was negative for acute abnormality, but showed severe calcification of Left vertebral artery. MRI brain w/wo contrast showed right vertebral loss of flow, and findings suggestive of Left occipital lobe infarct. Patient received ASA 324mg x1 and was subsequently transferred to [facility] for " direct admission and further work up/management.     Past medical history:   Past Medical History:   Diagnosis Date    ASTHMA     Chronic fatigue 9/28/2016    Degenerative arthritis of spine     Mild intermittent asthma with acute exacerbation 6/18/2015    Post-menopause on HRT (hormone replacement therapy)     Pulmonary nodule 6/28/2016    Pulmonary, stable CT 8/15    Rheumatic fever     Weight gain 6/28/2016       Past surgical history:   Past Surgical History:   Procedure Laterality Date    FUSION, SPINE, LUMBAR, PLIF      PRIMARY C SECTION      TUBAL LIGATION         Family history:   Family History   Problem Relation Age of Onset    Other Mother         global decline due to age    Heart Disease Father     Thyroid Child        Social history:   Social History     Socioeconomic History    Marital status:      Spouse name: Not on file    Number of children: Not on file    Years of education: Not on file    Highest education level: Not on file   Occupational History    Not on file   Tobacco Use    Smoking status: Never    Smokeless tobacco: Never   Vaping Use    Vaping Use: Never used   Substance and Sexual Activity    Alcohol use: Yes     Alcohol/week: 0.6 - 1.2 oz     Types: 1 - 2 Glasses of wine per week     Comment: occ wine    Drug use: No    Sexual activity: Not on file   Other Topics Concern    Not on file   Social History Narrative         Social Determinants of Health     Financial Resource Strain: Not on file   Food Insecurity: Not on file   Transportation Needs: Not on file   Physical Activity: Not on file   Stress: Not on file   Social Connections: Not on file   Intimate Partner Violence: Not on file   Housing Stability: Not on file       Current medications:   No current facility-administered medications for this visit.     No current outpatient medications on file.     Facility-Administered Medications Ordered in Other Visits   Medication Dose    DILTIAZem CD (CARDIZEM CD) capsule 240  mg  240 mg    senna-docusate (PERICOLACE or SENOKOT S) 8.6-50 MG per tablet 2 Tablet  2 Tablet    And    polyethylene glycol/lytes (MIRALAX) PACKET 1 Packet  1 Packet    And    magnesium hydroxide (MILK OF MAGNESIA) suspension 30 mL  30 mL    And    bisacodyl (DULCOLAX) suppository 10 mg  10 mg    acetaminophen (Tylenol) tablet 650 mg  650 mg    atorvastatin (LIPITOR) tablet 80 mg  80 mg    aspirin EC (ECOTRIN) tablet 81 mg  81 mg       Medication Allergy:  Allergies   Allergen Reactions    Lisinopril Unspecified    Terramycin [Oxytetracycline]      Rash as a child        Review of systems:   Constitutional: denies fever, night sweats, weight loss.   Eyes: denies acute vision change, eye pain or secretion.   Ears, Nose, Mouth, Throat: denies nasal secretion, nasal bleeding, difficulty swallowing, hearing loss, tinnitus, vertigo, ear pain, acute dental problems, oral ulcers or lesions.   Endocrine: denies recent weight changes, heat or cold intolerance, polyuria, polydypsia, polyphagia,abnormal hair growth.  Cardiovascular: denies new onset of chest pain, palpitations, syncope, or dyspnea of exertion.  Pulmonary: denies shortness of breath, new onset of cough, hemoptysis, wheezing, chest pain or flu-like symptoms.   GI: denies nausea, vomiting, diarrhea, GI bleeding, change in appetite, abdominal pain, and change in bowel habits.  : denies dysuria, urinary incontinence, hematuria.  Heme/oncology: denies history of easy bruising or bleeding. No history of cancer, DVTor PE.  Allergy/immunology: denies hives/urticaria, or itching.   Dermatologic: denies new rash, or new skin lesions.  Musculoskeletal:denies joint swelling or pain, muscle pain, neck and back pain.   Psychiatric: denies symptoms of depression, anxiety, hallucinations, mood swings or changes, suicidal or homicidal thoughts.     Physical examination:   /80  MO 79  RR 16  94%     General: Patient in no acute distress, pleasant and  cooperative.  HEENT: Normocephalic, no signs of acute trauma.   Neck: supple, no meningeal signs or carotid bruits. There is normal range of motion. No tenderness on exam.   Chest: clear to auscultation. No cough.   CV: RRR, no murmurs.   Skin: no signs of acute rashes or trauma.   Musculoskeletal: joints exhibit full range of motion, without any pain to palpation. There are no signs of joint or muscle swelling. There is no tenderness to deep palpation of muscles.   Psychiatric: No hallucinatory behavior. Denies symptoms of depression or suicidal ideation. Mood and affect appear normal on exam.     NEUROLOGICAL EXAM:   Mental status, orientation: Awake, alert and fully oriented.   Speech and language: speech is clear and fluent. The patient is able to name, repeat and comprehend.   Memory: There is intact recollection of recent and remote events.   Cranial nerve exam: Pupils are 3-4 mm bilaterally and equally reactive to light and accommodation.   CN II- (+) right sided hemianopsia on confrontation visual field testing  Intact full EOM in all directions of gaze.   There is no nystagmus on primary or secondary gaze. Face appears symmetric. Sensation in the face is intact to light touch. Uvula is midline. Palate elevates symmetrically. Tongue is midline and without any signs of tongue biting or fasciculations. Sternocleidomastoid muscles exhibit is normal strength bilaterally. Shoulder shrug is intact bilaterally.   Motor exam: Strength is 5/5 in all extremities. Tone is normal. No abnormal movements were seen on exam.   Sensory exam reveals normal sense of light touch, proprioception, vibration and pinprick in all extremities.   Deep tendon reflexes:  2+ throughout. Plantar responses are flexor. There is no clonus.   Coordination: shows a normal finger-nose-finger. Normal rapidly alternating movements.     NIH Stroke Scale    1a Level of Consciousness 0  1b Orientation Questions 0  1c Response to Commands 0  2 Gaze  0  3 Visual Fields - 1   4 Facial Movement 0  5 Motor Function (arm) 0  a Left  b Right   6 Motor Function (leg) 0  a Left   b Right   7 Limb Ataxia 0  8 Sensory 0  9 Language 0  10 Articulation 0  11 Extinction/Inattention 0    Score:  1    ANCILLARY DATA REVIEWED:     Lab Data Review:  Recent Results (from the past 24 hour(s))   Lipid Profile    Collection Time: 08/30/22  3:09 AM   Result Value Ref Range    Cholesterol,Tot 226 (H) 100 - 199 mg/dL    Triglycerides 56 0 - 149 mg/dL     >=40 mg/dL     (H) <100 mg/dL   CBC with Differential    Collection Time: 08/30/22  3:09 AM   Result Value Ref Range    WBC 6.7 4.8 - 10.8 K/uL    RBC 4.38 4.20 - 5.40 M/uL    Hemoglobin 14.2 12.0 - 16.0 g/dL    Hematocrit 42.0 37.0 - 47.0 %    MCV 95.9 81.4 - 97.8 fL    MCH 32.4 27.0 - 33.0 pg    MCHC 33.8 33.6 - 35.0 g/dL    RDW 46.5 35.9 - 50.0 fL    Platelet Count 324 164 - 446 K/uL    MPV 8.9 (L) 9.0 - 12.9 fL    Neutrophils-Polys 55.90 44.00 - 72.00 %    Lymphocytes 26.70 22.00 - 41.00 %    Monocytes 9.10 0.00 - 13.40 %    Eosinophils 7.70 (H) 0.00 - 6.90 %    Basophils 0.30 0.00 - 1.80 %    Immature Granulocytes 0.30 0.00 - 0.90 %    Nucleated RBC 0.00 /100 WBC    Neutrophils (Absolute) 3.76 2.00 - 7.15 K/uL    Lymphs (Absolute) 1.80 1.00 - 4.80 K/uL    Monos (Absolute) 0.61 0.00 - 0.85 K/uL    Eos (Absolute) 0.52 (H) 0.00 - 0.51 K/uL    Baso (Absolute) 0.02 0.00 - 0.12 K/uL    Immature Granulocytes (abs) 0.02 0.00 - 0.11 K/uL    NRBC (Absolute) 0.00 K/uL   Comp Metabolic Panel (CMP)    Collection Time: 08/30/22  3:09 AM   Result Value Ref Range    Sodium 139 135 - 145 mmol/L    Potassium 4.0 3.6 - 5.5 mmol/L    Chloride 105 96 - 112 mmol/L    Co2 23 20 - 33 mmol/L    Anion Gap 11.0 7.0 - 16.0    Glucose 100 (H) 65 - 99 mg/dL    Bun 11 8 - 22 mg/dL    Creatinine 0.60 0.50 - 1.40 mg/dL    Calcium 9.2 8.5 - 10.5 mg/dL    AST(SGOT) 14 12 - 45 U/L    ALT(SGPT) 14 2 - 50 U/L    Alkaline Phosphatase 67 30 - 99 U/L     Total Bilirubin 0.5 0.1 - 1.5 mg/dL    Albumin 4.3 3.2 - 4.9 g/dL    Total Protein 6.8 6.0 - 8.2 g/dL    Globulin 2.5 1.9 - 3.5 g/dL    A-G Ratio 1.7 g/dL   Magnesium    Collection Time: 08/30/22  3:09 AM   Result Value Ref Range    Magnesium 2.1 1.5 - 2.5 mg/dL   Hemoglobin A1C    Collection Time: 08/30/22  3:09 AM   Result Value Ref Range    Glycohemoglobin 5.6 4.0 - 5.6 %    Est Avg Glucose 114 mg/dL   ESTIMATED GFR    Collection Time: 08/30/22  3:09 AM   Result Value Ref Range    GFR (CKD-EPI) 96 >60 mL/min/1.73 m 2       Labs reviewed by me.     Records reviewed:       Imaging reviewed by me:     No orders to display         Presumed mechanism by TOAST:  __Large Artery Atherosclerosis  __Small Vessel (Lacunar)  __Cardioembolic  __Other (Sickle Cell, Vasculitis, Hypercoagulable)  __Unknown    Modified Lares Scale (MRS): 1 = No significant disability, despite symptoms; able to perform all usual duties and activities      ASSESSMENT AND PLAN:    Patient is a 72 yo female with PMH of HTN, asthma, and SVT admitted for Acute CVA presenting with right sided hemianopsia. Patient is being referred to Neurology for Cranial MRI findings suggestive of left occipital lobe infarct.      Acute CVA    Patient presenting with right sided hemianopsia  No other focal neurologic deficits noted on physical examination   -MRI findings at [facility] suggestive of left occipital lobe infarct  -Obtain copy of Cranial MRI from [facility]  -On Telemetry, no evidence of Atrial fibrillation    Work up:   08/30/22    Hgb 5.6%    Recommendations:   Transthoracic Echocardiogram  2. CTA of Head and Neck   3. Continue ASA 81 mg , PO OD and Atorvastatin 80 mg, PO OD  4. Blood pressure goal of -130, patient was taking Losartan 25 mg and Dilitiazem 240mg, OD as maintenance medication, (usual BP per patient is 130-140 SBP)- suggest to resume BP medications   5. Recommend cardiac monitoring, Zio patch for 14 day heart  monitoring as outpatient  6. Follow up with Neurology Stroke Clinic    DVT Prophylaxis- SCDs    Nettie Cruz M.D.  [facility]

## 2022-08-30 NOTE — PROGRESS NOTES
Monitor Summary: SR 72-89, NJ 0.21, QRS 0.09, QT 0.39, with couple PACs and PVCs per strip from monitor room.

## 2022-08-30 NOTE — ASSESSMENT & PLAN NOTE
MRI brain w/wo contrast showing L occipital lobe infarct with petechial hemorrhage. CT head showing severe calcification of L vertebral artery. ABCD2 score of 4. NIHSS score of 1. Neuro exam showing R lower quadrantic anopsia.  -Lipid panel elevated cholesterol and elevated LDL, HbA1c wnl  -Received ASA 324mg at OSH  -Continue ASA 81mg daily  -Started on atorvastatin 80mg daily  -Neurology consultation with recs for CTA Head and Neck, ECHO

## 2022-08-30 NOTE — THERAPY
Physical Therapy Contact Note    Patient Name: Ezio Mora  Age:  71 y.o., Sex:  female  Medical Record #: [id number]  Today's Date: 8/30/2022 08/30/22 0831   Interdisciplinary Plan of Care Collaboration   Collaboration Comments PT consult received.  Spoke with pt, she reported no mobility deficits or concerns, primarily limited by vision changes.  Educated pt on role of acute PT and purpose of PT evaluation.  Pt politley declined, reported no mobility concerns with DC home and was able to state compensatory strategies for vision changes.  Per chart review pt with 24/24 6 clicks score.  Will complete order, please reorder if status changes.  RN informed.     Imani Voss, PT, DPT

## 2022-08-30 NOTE — HOSPITAL COURSE
Ms. Eizo Mora is a 72 yo female with pmhx of HTN, asthma, and SVT who presented with right hemianopsia from [facility]. She states she normally gets a headache and has floaters and sees wavy lines which usually go away after 10 minutes. On Sun at 11:30 she saw some dark spots appear then complete blackout of her right peripheral vision. 4:30 pm she had a headache attributing it to sinus problems and took tylenol x2 with relief of the headache. Peripheral vision loss did not resolve and she went to see her optometrist 08/29 0900 who dilated her eyes and determined that she needed to go the ED for possible stroke. CT head was negative for acute abnormality, showed severe calcification of L vertebral artery. MRI brain w/wo contrast with L occipital lobe infract with petechial hemorrhage. She received ASA 324mg x 1 and transferred to [facility] for direct admission for evaluation by Neurology.  8/30 Neurology recommendations for CT-CTA head and neck and Echo

## 2022-08-30 NOTE — CARE PLAN
The patient is Stable - Low risk of patient condition declining or worsening    Shift Goals  Clinical Goals: Monitor neuro status  Patient Goals: Find out more details about plan of care  Family Goals: Get updates    Progress made toward(s) clinical / shift goals:      Problem: Neuro Status  Goal: Neuro status will remain stable or improve  Outcome: Progressing    Q4H neuro checks in place. Pt's neurological status (A/Ox4) remains unchanged throughout shift. Bed alarm is on, call light within reach.     Patient is not progressing towards the following goals:

## 2022-08-30 NOTE — ASSESSMENT & PLAN NOTE
Past 48hrs since onset of neurological symptoms, no longer need for permissive hypertension. Will continue home bp medication for bp control.  -continue home losartan 25mg

## 2022-08-30 NOTE — PROGRESS NOTES
4 Eyes Skin Assessment Completed by CHUYITA Lawrence and CHUYITA Rebolledo.    Head WDL  Ears WDL  Nose WDL  Mouth WDL  Neck WDL  Breast/Chest WDL  Shoulder Blades WDL  Spine Scar from previous back surgery  (R) Arm/Elbow/Hand WDL  (L) Arm/Elbow/Hand WDL  Abdomen WDL  Groin WDL  Scrotum/Coccyx/Buttocks WDL  (R) Leg WDL  (L) Leg WDL  (R) Heel/Foot/Toe WDL  (L) Heel/Foot/Toe WDL          Devices In Places Tele Box and Pulse Ox      Interventions In Place Pillows and Pressure Redistribution Mattress    Possible Skin Injury No    Pictures Uploaded Into Epic N/A  Wound Consult Placed N/A  RN Wound Prevention Protocol Ordered No

## 2022-08-30 NOTE — H&P
" Neurology Initial Consult H&P  Neurohospitalist Service, [facility]     History of present illness:     Mrs. Ezio Mora  is a 70 yo female with PMHx of HTN, asthma, and SVT admitted for Acute CVA presenting with right sided hemianopsia. Patient is being referred to Neurology for Cranial MRI finding done in [facility] suggestive of left occipital lobe infarct per chart review from admitting service. (No MRI uploaded as of yet.)     Patient states that 3 years ago she started to experience occasional episodes of seeing wavy lines on her right eye accompanied by episodes of transient visual change described as \" seeing punched holes of visual field defect on right eye\". She states that these episodes would appear only rarely and would last 10-15 minutes at the most. This is occasionally accompanied by headache which would resolve with tylenol. No prior history of stroke, no body weakness, no changes in speech and other symptoms.      On 08/28/22 at 11:30 am patient experienced loss of lateral peripheral visual field on right eye. Patient at that time had no headache, no nausea nor vomiting, no noted weakness on any part of the body, was coherent and no changes in her speech. This visual change persisted until she developed headache 4 hours after, which was subsequently resolved with intake of Tylenol. Patient was seen by her optometrist the next day where a dilated fundoscopic exam was done which was negative. Since then, the visual change on her right eye is constant with no other symptoms noted. Patient was advised to go to ED for further work up where her NIHSS Score was noted to be 1. CT head was negative for acute abnormality, but showed severe calcification of Left vertebral artery. MRI brain w/wo contrast showed right vertebral loss of flow, and findings suggestive of Left occipital lobe infarct. Patient received ASA 324mg x1 and was subsequently transferred to [facility]" for direct admission and further work up/management.      Past medical history:   Past Medical History        Past Medical History:   Diagnosis Date    ASTHMA      Chronic fatigue 9/28/2016    Degenerative arthritis of spine      Mild intermittent asthma with acute exacerbation 6/18/2015    Post-menopause on HRT (hormone replacement therapy)      Pulmonary nodule 6/28/2016     Pulmonary, stable CT 8/15    Rheumatic fever      Weight gain 6/28/2016            Past surgical history:   Past Surgical History         Past Surgical History:   Procedure Laterality Date    FUSION, SPINE, LUMBAR, PLIF        PRIMARY C SECTION        TUBAL LIGATION                Family history:   Family History         Family History   Problem Relation Age of Onset    Other Mother           global decline due to age    Heart Disease Father      Thyroid Child              Social history:   Social History               Socioeconomic History    Marital status:        Spouse name: Not on file    Number of children: Not on file    Years of education: Not on file    Highest education level: Not on file   Occupational History    Not on file   Tobacco Use    Smoking status: Never    Smokeless tobacco: Never   Vaping Use    Vaping Use: Never used   Substance and Sexual Activity    Alcohol use: Yes       Alcohol/week: 0.6 - 1.2 oz       Types: 1 - 2 Glasses of wine per week       Comment: occ wine    Drug use: No    Sexual activity: Not on file   Other Topics Concern    Not on file   Social History Narrative           Social Determinants of Health      Financial Resource Strain: Not on file   Food Insecurity: Not on file   Transportation Needs: Not on file   Physical Activity: Not on file   Stress: Not on file   Social Connections: Not on file   Intimate Partner Violence: Not on file   Housing Stability: Not on file            Current medications:   No current facility-administered medications for this visit.      No current outpatient  medications on file.           Facility-Administered Medications Ordered in Other Visits   Medication Dose    DILTIAZem CD (CARDIZEM CD) capsule 240 mg  240 mg    senna-docusate (PERICOLACE or SENOKOT S) 8.6-50 MG per tablet 2 Tablet  2 Tablet     And    polyethylene glycol/lytes (MIRALAX) PACKET 1 Packet  1 Packet     And    magnesium hydroxide (MILK OF MAGNESIA) suspension 30 mL  30 mL     And    bisacodyl (DULCOLAX) suppository 10 mg  10 mg    acetaminophen (Tylenol) tablet 650 mg  650 mg    atorvastatin (LIPITOR) tablet 80 mg  80 mg    aspirin EC (ECOTRIN) tablet 81 mg  81 mg      Medication Allergy:        Allergies   Allergen Reactions    Lisinopril Unspecified    Terramycin [Oxytetracycline]         Rash as a child          Review of systems:   Constitutional: denies fever, night sweats, weight loss.   Eyes: denies acute vision change, eye pain or secretion.   Ears, Nose, Mouth, Throat: denies nasal secretion, nasal bleeding, difficulty swallowing, hearing loss, tinnitus, vertigo, ear pain, acute dental problems, oral ulcers or lesions.   Endocrine: denies recent weight changes, heat or cold intolerance, polyuria, polydypsia, polyphagia,abnormal hair growth.  Cardiovascular: denies new onset of chest pain, palpitations, syncope, or dyspnea of exertion.  Pulmonary: denies shortness of breath, new onset of cough, hemoptysis, wheezing, chest pain or flu-like symptoms.   GI: denies nausea, vomiting, diarrhea, GI bleeding, change in appetite, abdominal pain, and change in bowel habits.  : denies dysuria, urinary incontinence, hematuria.  Heme/oncology: denies history of easy bruising or bleeding. No history of cancer, DVTor PE.  Allergy/immunology: denies hives/urticaria, or itching.   Dermatologic: denies new rash, or new skin lesions.  Musculoskeletal:denies joint swelling or pain, muscle pain, neck and back pain.   Psychiatric: denies symptoms of depression, anxiety, hallucinations, mood swings or changes,  suicidal or homicidal thoughts.      Physical examination:   /80  DC 79  RR 16  94%      General: Patient in no acute distress, pleasant and cooperative.  HEENT: Normocephalic, no signs of acute trauma.   Neck: supple, no meningeal signs or carotid bruits. There is normal range of motion. No tenderness on exam.   Chest: clear to auscultation. No cough.   CV: RRR, no murmurs.   Skin: no signs of acute rashes or trauma.   Musculoskeletal: joints exhibit full range of motion, without any pain to palpation. There are no signs of joint or muscle swelling. There is no tenderness to deep palpation of muscles.   Psychiatric: No hallucinatory behavior. Denies symptoms of depression or suicidal ideation. Mood and affect appear normal on exam.      NEUROLOGICAL EXAM:   Mental status, orientation: Awake, alert and fully oriented.   Speech and language: speech is clear and fluent. The patient is able to name, repeat and comprehend.   Memory: There is intact recollection of recent and remote events.   Cranial nerve exam: Pupils are 3-4 mm bilaterally and equally reactive to light and accommodation.   CN II- (+) right sided hemianopsia on confrontation visual field testing  Intact full EOM in all directions of gaze.   There is no nystagmus on primary or secondary gaze. Face appears symmetric. Sensation in the face is intact to light touch. Uvula is midline. Palate elevates symmetrically. Tongue is midline and without any signs of tongue biting or fasciculations. Sternocleidomastoid muscles exhibit is normal strength bilaterally. Shoulder shrug is intact bilaterally.   Motor exam: Strength is 5/5 in all extremities. Tone is normal. No abnormal movements were seen on exam.   Sensory exam reveals normal sense of light touch, proprioception, vibration and pinprick in all extremities.   Deep tendon reflexes:  2+ throughout. Plantar responses are flexor. There is no clonus.   Coordination: shows a normal finger-nose-finger. Normal  rapidly alternating movements.      NIH Stroke Scale     1a Level of Consciousness 0  1b Orientation Questions 0  1c Response to Commands 0  2 Gaze 0  3 Visual Fields - 1   4 Facial Movement 0  5 Motor Function (arm) 0  a Left  b Right   6 Motor Function (leg) 0  a Left   b Right   7 Limb Ataxia 0  8 Sensory 0  9 Language 0  10 Articulation 0  11 Extinction/Inattention 0     Score:  1     ANCILLARY DATA REVIEWED:      Lab Data Review:  Recent Results         Recent Results (from the past 24 hour(s))   Lipid Profile     Collection Time: 08/30/22  3:09 AM   Result Value Ref Range     Cholesterol,Tot 226 (H) 100 - 199 mg/dL     Triglycerides 56 0 - 149 mg/dL      >=40 mg/dL      (H) <100 mg/dL   CBC with Differential     Collection Time: 08/30/22  3:09 AM   Result Value Ref Range     WBC 6.7 4.8 - 10.8 K/uL     RBC 4.38 4.20 - 5.40 M/uL     Hemoglobin 14.2 12.0 - 16.0 g/dL     Hematocrit 42.0 37.0 - 47.0 %     MCV 95.9 81.4 - 97.8 fL     MCH 32.4 27.0 - 33.0 pg     MCHC 33.8 33.6 - 35.0 g/dL     RDW 46.5 35.9 - 50.0 fL     Platelet Count 324 164 - 446 K/uL     MPV 8.9 (L) 9.0 - 12.9 fL     Neutrophils-Polys 55.90 44.00 - 72.00 %     Lymphocytes 26.70 22.00 - 41.00 %     Monocytes 9.10 0.00 - 13.40 %     Eosinophils 7.70 (H) 0.00 - 6.90 %     Basophils 0.30 0.00 - 1.80 %     Immature Granulocytes 0.30 0.00 - 0.90 %     Nucleated RBC 0.00 /100 WBC     Neutrophils (Absolute) 3.76 2.00 - 7.15 K/uL     Lymphs (Absolute) 1.80 1.00 - 4.80 K/uL     Monos (Absolute) 0.61 0.00 - 0.85 K/uL     Eos (Absolute) 0.52 (H) 0.00 - 0.51 K/uL     Baso (Absolute) 0.02 0.00 - 0.12 K/uL     Immature Granulocytes (abs) 0.02 0.00 - 0.11 K/uL     NRBC (Absolute) 0.00 K/uL   Comp Metabolic Panel (CMP)     Collection Time: 08/30/22  3:09 AM   Result Value Ref Range     Sodium 139 135 - 145 mmol/L     Potassium 4.0 3.6 - 5.5 mmol/L     Chloride 105 96 - 112 mmol/L     Co2 23 20 - 33 mmol/L     Anion Gap 11.0 7.0 - 16.0     Glucose  100 (H) 65 - 99 mg/dL     Bun 11 8 - 22 mg/dL     Creatinine 0.60 0.50 - 1.40 mg/dL     Calcium 9.2 8.5 - 10.5 mg/dL     AST(SGOT) 14 12 - 45 U/L     ALT(SGPT) 14 2 - 50 U/L     Alkaline Phosphatase 67 30 - 99 U/L     Total Bilirubin 0.5 0.1 - 1.5 mg/dL     Albumin 4.3 3.2 - 4.9 g/dL     Total Protein 6.8 6.0 - 8.2 g/dL     Globulin 2.5 1.9 - 3.5 g/dL     A-G Ratio 1.7 g/dL   Magnesium     Collection Time: 08/30/22  3:09 AM   Result Value Ref Range     Magnesium 2.1 1.5 - 2.5 mg/dL   Hemoglobin A1C     Collection Time: 08/30/22  3:09 AM   Result Value Ref Range     Glycohemoglobin 5.6 4.0 - 5.6 %     Est Avg Glucose 114 mg/dL   ESTIMATED GFR     Collection Time: 08/30/22  3:09 AM   Result Value Ref Range     GFR (CKD-EPI) 96 >60 mL/min/1.73 m 2            Labs reviewed by me.      Records reviewed:         Imaging reviewed by me:      No orders to display            Presumed mechanism by TOAST:  __Large Artery Atherosclerosis  __Small Vessel (Lacunar)  __Cardioembolic  __Other (Sickle Cell, Vasculitis, Hypercoagulable)  __Unknown     Modified Nineveh Scale (MRS): 1 = No significant disability, despite symptoms; able to perform all usual duties and activities        ASSESSMENT AND PLAN:     Patient is a 70 yo female with PMH of HTN, asthma, and SVT admitted for Acute CVA presenting with right sided hemianopsia. Patient is being referred to Neurology for Cranial MRI findings suggestive of left occipital lobe infarct.       Acute CVA  -Patient presenting with right sided hemianopsia  -No other focal neurologic deficits noted on physical examination   -MRI findings at [facility] suggestive of left occipital lobe infarct  -Obtain copy of Cranial MRI from [facility]  -On Telemetry, no evidence of Atrial fibrillation     Work up:   08/30/22    Hgb 5.6%     Recommendations:   Transthoracic Echocardiogram  2. CTA of Head and Neck   3. Continue ASA 81 mg , PO OD and Atorvastatin 80 mg, PO OD  4. Blood pressure  goal of -130, patient was taking Losartan 25 mg and Dilitiazem 240mg, OD as maintenance medication, (usual BP per patient is 130-140 SBP)- suggest to resume BP medications   5. Recommend cardiac monitoring, Zio patch for 14 day heart monitoring as outpatient  6. Follow up with Neurology Stroke Clinic     DVT Prophylaxis- SCDs     Nettie Cruz M.D.  [facility]

## 2022-08-30 NOTE — ASSESSMENT & PLAN NOTE
Diagnosed with tachycardia due to SVTs on Diltiazem 250mg, follows up regularly with cardiologist. Angiogram this past Jan/Feb with no significant findings.   -CTM, currently HR<100  -Continue home diltiazem

## 2022-08-30 NOTE — PROGRESS NOTES
[facility]PITAL[facility] TRIAGE OFFICER DIRECT ADMISSION REPORT  Transferring facility: [facility]  Transferring physician: Dr. Regina DO  Transferring facility/physician contact number: [facility]  Chief complaint: acute vertebral stroke  Pertinent history & patient course: 71 F presents with right sided heminopsia that started yesterday 11:30 am and symptoms persist. CTH neg. MRI showed right vertebral loss of flow. NIH 1  Pertinent imaging & lab results: see intake  Code Status: continue prior code   Further work up or recommendations per triage officer prior to transfer: None  Consultants called prior to transfer and pertinent input from consultants:  suggested aspirin 324 mg daily.  Patient accepted for transfer: Yes  Consultants to be called upon arrival: neuro in am  Admission status: Inpatient.   Floor requested: neuro tele  If ICU transfer, name of intensivist case discussed with and pertinent input from critical care: none     Please inform the triage officer upon arrival of the patient to [facility] for assignment of a hospitalist to perform admission.      For any question or concerns regarding the care of this patient, please reach out to the assigned hospitalist.

## 2022-08-30 NOTE — H&P
"[facility] Internal Medicine History & Physical Note    Date of Service  8/29/2022    [facility] Team: BRET   Attending: Finn Macias M.d.  Senior Resident: Dr. Darin Alvarenga MD  Contact Number: [phone]    Primary Care Physician  Keke Casas M.D.    Consultants  neurology    Specialist Names:  Galo    Code Status  Full Code    Chief Complaint  No chief complaint on file.      History of Presenting Illness (HPI):   Patient is a 70yo female with PMH of HTN, asthma, and SVT that presents with R hemianopsia from [facility].    Patient states that for the past year, they've been having these episodes that happen intermittently where they will have a headache, notice \"wavy lines\" but will go away after resting for 10-15min. They state that yesterday morning at 1130 they had a similar episode, but it became progressively worse with subsequent \"flashing\" of red/yellow colors. They decided to go to sleep at 1500 due to headache, and woke up at 1830 with no lateral peripheral vision of their R eye. They made an appointment with their optometrist to be seen at 0900 this morning. After assessment by optometrist, which including pupil dilation, no abnormality was found and patient was sent to the ED for possible stroke. CT head was negative for acute abnormality, but did have severe calcification of L vertebral artery. MRI brain w/wo contrast showing L occipital lobe infarct with petechial hemorrhage. Patient received ASA 324mg x1 and transferred to [facility] for direct admission.    I discussed the plan of care with patient.    Review of Systems  Review of Systems   Constitutional:  Negative for chills, diaphoresis and fever.   HENT:  Negative for ear pain, hearing loss and sore throat.    Eyes:  Negative for blurred vision, double vision, photophobia, pain, discharge and redness.        R peripheral vision loss   Respiratory:  Negative for cough and shortness of breath.    Cardiovascular:  Negative for chest " pain, palpitations and leg swelling.   Gastrointestinal:  Negative for abdominal pain, blood in stool, constipation, diarrhea, melena, nausea and vomiting.   Genitourinary:  Negative for dysuria, frequency and hematuria.   Musculoskeletal:  Negative for back pain, joint pain and neck pain.   Neurological:  Positive for headaches. Negative for dizziness, tingling, tremors, speech change and weakness.     Past Medical History   has a past medical history of ASTHMA, Chronic fatigue (9/28/2016), Degenerative arthritis of spine, Mild intermittent asthma with acute exacerbation (6/18/2015), Post-menopause on HRT (hormone replacement therapy), Pulmonary nodule (6/28/2016), Rheumatic fever, and Weight gain (6/28/2016).    Surgical History   has a past surgical history that includes primary c section; tubal ligation; and fusion, spine, lumbar, plif.     Family History  family history includes Heart Disease in her father; Other in her mother; Thyroid in her child.   Family history reviewed with patient.     Social History  Tobacco: Never smoker  Alcohol: Drinks 2 glasses of wine nightly  Recreational drugs (illegal or prescription): Denies  Employment: Retired  Living Situation: Lives with  in [place]  Recent Travel: Denies  Primary Care Provider: Reviewed Dr. Yanci Casas  Other (stressors, spirituality, exposures): None    Allergies  Allergies   Allergen Reactions    Lisinopril Unspecified    Terramycin [Oxytetracycline]      Rash as a child        Medications  Prior to Admission Medications   Prescriptions Last Dose Informant Patient Reported? Taking?   Calcium Carbonate 1500 (600 Ca) MG Tab 8/29 at 0800  Yes No   Sig: Take  by mouth every day.   DILTIAZem (CARDIZEM) 30 MG Tab   No No   Sig: TAKE 1 TABLET BY MOUTH TWICE DAILY   Patient taking differently: 240 mg every day.   DILTIAZem CD (CARDIZEM CD) 240 MG CAPSULE SR 24 HR   Yes No   Magnesium Citrate 100 MG Tab   Yes No   Sig: Take  by mouth every day.    albuterol (PROAIR HFA) 108 (90 Base) MCG/ACT Aero Soln inhalation aerosol   No No   Sig: Inhale 2 Puffs by mouth every 6 hours as needed.   fluticasone-salmeterol (WIXELA INHUB) 100-50 MCG/DOSE AEROSOL POWDER, BREATH ACTIVATED   No No   Sig: Inhale 1 Puff 2 times a day. Rince mouth after use   losartan (COZAAR) 25 MG Tab 8/28 at 2000  Yes No   Sig: Take 25 mg by mouth every day.   methylPREDNISolone (MEDROL DOSEPAK) 4 MG Tablet Therapy Pack   No No   Sig: Follow schedule on package instructions.   montelukast (SINGULAIR) 10 MG Tab   No No   Sig: TAKE 1 TABLET BY MOUTH EVERY DAY   traZODone (DESYREL) 50 MG Tab   No No   Sig: Take 1 Tablet by mouth every evening.   vitamin D (VITAMIND D3) 1000 UNIT Tab   Yes No   Sig: Take 1,000 Units by mouth every day.      Facility-Administered Medications: None       Physical Exam  Temp:  [36.7 °C (98.1 °F)-37.3 °C (99.1 °F)] 36.7 °C (98.1 °F)  Pulse:  [74-84] 84  Resp:  [12] 12  BP: (119-147)/(71-78) 147/78  SpO2:  [93 %-94 %] 94 %  Blood Pressure : 139/74   Temperature: 37.3 °C (99.1 °F)   Pulse: 79   Respiration: 12   Pulse Oximetry: 94 %       Physical Exam  Vitals reviewed.   Constitutional:       General: She is not in acute distress.     Appearance: Normal appearance. She is not ill-appearing.   HENT:      Head: Normocephalic and atraumatic.      Mouth/Throat:      Mouth: Mucous membranes are moist.      Pharynx: No oropharyngeal exudate or posterior oropharyngeal erythema.   Eyes:      General: No scleral icterus.     Extraocular Movements: Extraocular movements intact.      Conjunctiva/sclera: Conjunctivae normal.      Pupils: Pupils are equal, round, and reactive to light.   Cardiovascular:      Rate and Rhythm: Normal rate and regular rhythm.      Heart sounds: Normal heart sounds. No murmur heard.    No friction rub. No gallop.   Pulmonary:      Effort: No respiratory distress.      Breath sounds: Normal breath sounds. No wheezing, rhonchi or rales.   Abdominal:       General: Abdomen is flat. There is no distension.      Palpations: Abdomen is soft. There is no mass.      Tenderness: There is no abdominal tenderness. There is no guarding or rebound.   Musculoskeletal:         General: No swelling or tenderness. Normal range of motion.      Cervical back: Normal range of motion. No tenderness.      Right lower leg: No edema.      Left lower leg: No edema.   Lymphadenopathy:      Cervical: No cervical adenopathy.   Skin:     General: Skin is warm and dry.      Coloration: Skin is not jaundiced.   Neurological:      Mental Status: She is alert and oriented to person, place, and time.      Sensory: No sensory deficit.      Motor: No weakness.      Coordination: Coordination normal.      Gait: Gait normal.      Deep Tendon Reflexes: Reflexes normal.      Comments: Full neurological exam performed with only deficit CNII with R lower quadrantic anopsia. Rest of the cranial nerves intact. Strength intact in all extremities 5/5. Negative romberg. Without pronator drift. Normal gait. DTRs 2+. Cerebellar exam normal.   Psychiatric:         Mood and Affect: Mood normal.         Behavior: Behavior normal.         Thought Content: Thought content normal.         Judgment: Judgment normal.       Laboratory:          No results for input(s): ALTSGPT, ASTSGOT, ALKPHOSPHAT, TBILIRUBIN, DBILIRUBIN, GAMMAGT, AMYLASE, LIPASE, ALB, PREALBUMIN, GLUCOSE in the last 72 hours.      No results for input(s): NTPROBNP in the last 72 hours.      No results for input(s): TROPONINT in the last 72 hours.    Imaging:  No orders to display       no X-Ray or EKG requiring interpretation    Assessment/Plan:  Problem Representation:   Patient is a 70yo female with PMH of HTN, asthma, and SVT that presents with R hemianopsia from [facility]. Found to have L occipital infarct on MRI.     I anticipate this patient will require at least two midnights for appropriate medical management, necessitating inpatient  admission because patient transferred from OSH     Patient will need a Telemetry bed on NEUROLOGY service .  The need is secondary to acute stroke.    * Acute CVA (cerebrovascular accident) (HCC)- (present on admission)  Assessment & Plan  MRI brain w/wo contrast showing L occipital lobe infarct with petechial hemorrhage. CT head showing severe calcification of L vertebral artery. ABCD2 score of 3. NIHSS score of 1. Neuro exam showing R lower quadrantic anopsia.    -Received ASA 324mg at OSH  -Continue ASA 81mg daily  -Started on atorvastatin 80mg daily  -Ordered lipid panel and HbA1c  -Neurology consultation    Asthma  Assessment & Plan  -Holding home inhalers    Essential hypertension- (present on admission)  Assessment & Plan  -Holding home antihypertensives    PSVT (paroxysmal supraventricular tachycardia) (HCC)- (present on admission)  Assessment & Plan  -Holding home diltiazem        VTE prophylaxis: SCDs/TEDs

## 2022-08-30 NOTE — CARE PLAN
The patient is Stable - Low risk of patient condition declining or worsening    Shift Goals  Clinical Goals: monitor neuro status, PT/OT eval  Patient Goals: comfort  Family Goals: aisha    Progress made toward(s) clinical / shift goals:    Problem: Neuro Status  Goal: Neuro status will remain stable or improve  Outcome: Progressing  Per pt, L eye vision gets little bit better than prior to admit  Problem: Knowledge Deficit - Standard  Goal: Patient and family/care givers will demonstrate understanding of plan of care, disease process/condition, diagnostic tests and medications  Outcome: Progressing   Understands POC. Worked with PT/OT.    Patient is not progressing towards the following goals:

## 2022-08-30 NOTE — THERAPY
OT Contact Note    Patient Name: Ezio Mora  Age:  71 y.o., Sex:  female  Medical Record #: [id number]  Today's Date: 8/30/2022 08/30/22 1525   Interdisciplinary Plan of Care Collaboration   IDT Collaboration with  Nursing   Collaboration Comments OT consult received, spoke with pt reported no mobility or ADL concerns states mostly issues with right visual field deficits. Pt given resources for the community including neuro-opthalmologist to contact for outpatient follow up and adaptive strategies. Pt very appreciative, also educated if patient has any concerns for home over next few days let RN know to re-consult.       Emmanuel Mendoza OTD, OTR/L

## 2022-08-31 ENCOUNTER — NON-PROVIDER VISIT (OUTPATIENT)
Dept: CARDIOLOGY | Facility: MEDICAL CENTER | Age: 72
End: 2022-08-31
Payer: MEDICARE

## 2022-08-31 VITALS
HEART RATE: 78 BPM | RESPIRATION RATE: 17 BRPM | TEMPERATURE: 97.9 F | BODY MASS INDEX: 26.91 KG/M2 | SYSTOLIC BLOOD PRESSURE: 116 MMHG | WEIGHT: 137.79 LBS | DIASTOLIC BLOOD PRESSURE: 67 MMHG | OXYGEN SATURATION: 92 %

## 2022-08-31 DIAGNOSIS — I49.1 APC (ATRIAL PREMATURE CONTRACTIONS): ICD-10-CM

## 2022-08-31 DIAGNOSIS — I47.10 SVT (SUPRAVENTRICULAR TACHYCARDIA) (HCC): ICD-10-CM

## 2022-08-31 DIAGNOSIS — I45.4 IVCD (INTRAVENTRICULAR CONDUCTION DEFECT): ICD-10-CM

## 2022-08-31 DIAGNOSIS — I63.9 ACUTE CVA (CEREBROVASCULAR ACCIDENT) (HCC): ICD-10-CM

## 2022-08-31 DIAGNOSIS — I49.3 PVC (PREMATURE VENTRICULAR CONTRACTION): ICD-10-CM

## 2022-08-31 PROCEDURE — A9270 NON-COVERED ITEM OR SERVICE: HCPCS

## 2022-08-31 PROCEDURE — 700102 HCHG RX REV CODE 250 W/ 637 OVERRIDE(OP)

## 2022-08-31 PROCEDURE — 99232 SBSQ HOSP IP/OBS MODERATE 35: CPT | Mod: GC | Performed by: PSYCHIATRY & NEUROLOGY

## 2022-08-31 PROCEDURE — 99239 HOSP IP/OBS DSCHRG MGMT >30: CPT | Mod: GC | Performed by: INTERNAL MEDICINE

## 2022-08-31 PROCEDURE — A9270 NON-COVERED ITEM OR SERVICE: HCPCS | Performed by: STUDENT IN AN ORGANIZED HEALTH CARE EDUCATION/TRAINING PROGRAM

## 2022-08-31 PROCEDURE — 700102 HCHG RX REV CODE 250 W/ 637 OVERRIDE(OP): Performed by: STUDENT IN AN ORGANIZED HEALTH CARE EDUCATION/TRAINING PROGRAM

## 2022-08-31 RX ORDER — BUDESONIDE AND FORMOTEROL FUMARATE DIHYDRATE 80; 4.5 UG/1; UG/1
2 AEROSOL RESPIRATORY (INHALATION)
Status: DISCONTINUED | OUTPATIENT
Start: 2022-08-31 | End: 2022-08-31 | Stop reason: HOSPADM

## 2022-08-31 RX ORDER — DILTIAZEM HYDROCHLORIDE 240 MG/1
240 CAPSULE, COATED, EXTENDED RELEASE ORAL DAILY
Qty: 30 CAPSULE | Refills: 0 | Status: SHIPPED | OUTPATIENT
Start: 2022-09-01 | End: 2022-08-31

## 2022-08-31 RX ORDER — ASPIRIN 81 MG/1
81 TABLET ORAL DAILY
Qty: 30 TABLET | Refills: 0 | Status: SHIPPED | OUTPATIENT
Start: 2022-09-01 | End: 2022-09-07

## 2022-08-31 RX ORDER — DILTIAZEM HYDROCHLORIDE 120 MG/1
240 TABLET, FILM COATED ORAL DAILY
Qty: 30 TABLET | Refills: 0 | Status: SHIPPED | OUTPATIENT
Start: 2022-08-31 | End: 2022-09-06 | Stop reason: SDUPTHER

## 2022-08-31 RX ORDER — ATORVASTATIN CALCIUM 80 MG/1
80 TABLET, FILM COATED ORAL EVERY EVENING
Qty: 30 TABLET | Refills: 0 | Status: SHIPPED | OUTPATIENT
Start: 2022-08-31 | End: 2022-09-06 | Stop reason: SDUPTHER

## 2022-08-31 RX ORDER — MONTELUKAST SODIUM 10 MG/1
10 TABLET ORAL NIGHTLY
Status: DISCONTINUED | OUTPATIENT
Start: 2022-08-31 | End: 2022-08-31 | Stop reason: HOSPADM

## 2022-08-31 RX ADMIN — DILTIAZEM HYDROCHLORIDE 240 MG: 240 CAPSULE, COATED, EXTENDED RELEASE ORAL at 05:14

## 2022-08-31 RX ADMIN — ASPIRIN 81 MG: 81 TABLET, COATED ORAL at 05:13

## 2022-08-31 RX ADMIN — LOSARTAN POTASSIUM 25 MG: 50 TABLET, FILM COATED ORAL at 05:14

## 2022-08-31 ASSESSMENT — PAIN DESCRIPTION - PAIN TYPE: TYPE: ACUTE PAIN

## 2022-08-31 NOTE — PROGRESS NOTES
NEUROLOGY PROGRESS NOTE  Neurohospitalist Service, [facility]    Interval History:   No acute events overnight. Patient states that her right eye peripheral vision is getting better as compared to yesterday. She was able to sleep well and had no complaints noted. Diagnostic work up revealed LDL of 112 and Hgba1C of 5.6 %. Patient was not previously taking ASA and Atorvastatin. She was started on Aspirin 81 mg OD and Atorvastatin 80 mg, OD and with home antihypertensive medications restarted as well (08/30/22).     Transthoracic Echocardiogram done as part of embolic workup revealed mildly dilated LA and LVEF of 60%, no shunts noted. Reviewed results of CTA of Head and Neck, significant for moderate atherosclerotic plaque of V4 segment of left vertebral artery.    Per patient, she respectfully declined PT/OT sessions as she does not have any mobility deficits or concerns , primarily limited only by vision changes. Patient states she will try to ambulate in the hospital with her nurse to check if she would have any difficulty while walking given her vision changes on the right eye.       Past medical history:   Past Medical History:   Diagnosis Date    ASTHMA     Chronic fatigue 9/28/2016    Degenerative arthritis of spine     Mild intermittent asthma with acute exacerbation 6/18/2015    Post-menopause on HRT (hormone replacement therapy)     Pulmonary nodule 6/28/2016    Pulmonary, stable CT 8/15    Rheumatic fever     Weight gain 6/28/2016       Past surgical history:   Past Surgical History:   Procedure Laterality Date    FUSION, SPINE, LUMBAR, PLIF      PRIMARY C SECTION      TUBAL LIGATION         Family history:   Family History   Problem Relation Age of Onset    Other Mother         global decline due to age    Heart Disease Father     Thyroid Child        Social history:   Social History     Socioeconomic History    Marital status:       Spouse name: Not on file    Number of children: Not on file    Years of education: Not on file    Highest education level: Not on file   Occupational History    Not on file   Tobacco Use    Smoking status: Never    Smokeless tobacco: Never   Vaping Use    Vaping Use: Never used   Substance and Sexual Activity    Alcohol use: Yes     Alcohol/week: 0.6 - 1.2 oz     Types: 1 - 2 Glasses of wine per week     Comment: occ wine    Drug use: No    Sexual activity: Not on file   Other Topics Concern    Not on file   Social History Narrative         Social Determinants of Health     Financial Resource Strain: Not on file   Food Insecurity: Not on file   Transportation Needs: Not on file   Physical Activity: Not on file   Stress: Not on file   Social Connections: Not on file   Intimate Partner Violence: Not on file   Housing Stability: Not on file       Current medications:   Current Facility-Administered Medications   Medication Dose    montelukast (SINGULAIR) tablet 10 mg  10 mg    budesonide-formoterol (SYMBICORT) 80-4.5 MCG/ACT inhaler 2 Puff  2 Puff    DILTIAZem CD (CARDIZEM CD) capsule 240 mg  240 mg    losartan (COZAAR) tablet 25 mg  25 mg    enoxaparin (Lovenox) inj 30 mg  30 mg    senna-docusate (PERICOLACE or SENOKOT S) 8.6-50 MG per tablet 2 Tablet  2 Tablet    And    polyethylene glycol/lytes (MIRALAX) PACKET 1 Packet  1 Packet    And    magnesium hydroxide (MILK OF MAGNESIA) suspension 30 mL  30 mL    And    bisacodyl (DULCOLAX) suppository 10 mg  10 mg    acetaminophen (Tylenol) tablet 650 mg  650 mg    atorvastatin (LIPITOR) tablet 80 mg  80 mg    aspirin EC (ECOTRIN) tablet 81 mg  81 mg       Medication Allergy:  Allergies   Allergen Reactions    Lisinopril Unspecified    Terramycin [Oxytetracycline]      Rash as a child        Review of systems:   Constitutional: denies fever, night sweats, weight loss.   Eyes: denies eye pain or secretion.   Ears, Nose, Mouth, Throat: denies nasal secretion, nasal  bleeding, difficulty swallowing, hearing loss, tinnitus, vertigo, ear pain, acute dental problems, oral ulcers or lesions.   Endocrine: denies recent weight changes, heat or cold intolerance, polyuria, polydypsia, polyphagia,abnormal hair growth.  Cardiovascular: denies new onset of chest pain, palpitations, syncope, or dyspnea of exertion.  Pulmonary: denies shortness of breath, new onset of cough, hemoptysis, wheezing, chest pain or flu-like symptoms.   GI: denies nausea, vomiting, diarrhea, GI bleeding, change in appetite, abdominal pain, and change in bowel habits.  : denies dysuria, urinary incontinence, hematuria.  Heme/oncology: denies history of easy bruising or bleeding. No history of cancer, DVTor PE.  Allergy/immunology: denies hives/urticaria, or itching.   Dermatologic: denies new rash, or new skin lesions.  Musculoskeletal:denies joint swelling or pain, muscle pain, neck and back pain.   Psychiatric: denies symptoms of depression, anxiety, hallucinations, mood swings or changes, suicidal or homicidal thoughts.     Physical examination:   Vitals:    08/30/22 2100 08/31/22 0000 08/31/22 0510 08/31/22 0800   BP: 118/68 121/68 118/67 116/67   Pulse: 81 83 80 78   Resp: 16 15 16 17   Temp:  36.6 °C (97.9 °F)     TempSrc: Temporal Temporal Temporal Temporal   SpO2: 92% 95% 92% 92%   Weight:         General: Patient in no acute distress, pleasant and cooperative.  HEENT: Normocephalic, no signs of acute trauma.   Neck: supple, no meningeal signs or carotid bruits. There is normal range of motion. No tenderness on exam.   Chest: clear to auscultation. No cough.   CV: RRR, no murmurs.   Skin: no signs of acute rashes or trauma.   Musculoskeletal: joints exhibit full range of motion, without any pain to palpation. There are no signs of joint or muscle swelling. There is no tenderness to deep palpation of muscles.   Psychiatric: No hallucinatory behavior. Denies symptoms of depression or suicidal ideation. Mood  and affect appear normal on exam.     NEUROLOGICAL EXAM:   Mental status, orientation: Awake, alert and fully oriented.   Speech and language: speech is clear and fluent. The patient is able to name, repeat and comprehend.   Memory: There is intact recollection of recent and remote events.   Cranial nerve exam: Pupils are 3-4 mm bilaterally and equally reactive to light and accommodation.    CN II- (+) right inferior quadrantanopsia on confrontation visual field testing. There is no nystagmus on primary or secondary gaze. Intact full EOM in all directions of gaze. Face appears symmetric. Sensation in the face is intact to light touch. Uvula is midline. Palate elevates symmetrically. Tongue is midline and without any signs of tongue biting or fasciculations. Sternocleidomastoid muscles exhibit is normal strength bilaterally. Shoulder shrug is intact bilaterally.   Motor exam: Strength is 5/5 in all extremities. Tone is normal. No abnormal movements were seen on exam.   Sensory exam reveals normal sense of light touch, proprioception, vibration and pinprick in all extremities.   Deep tendon reflexes:  2+ throughout. Plantar responses are flexor. There is no clonus.   Coordination: shows a normal finger-nose-finger. Normal rapidly alternating movements.       NIH Stroke Scale    1a Level of Consciousness 0  1b Orientation Questions 0  1c Response to Commands 0  2 Gaze 0  3 Visual Fields 1  4 Facial Movement 0  5 Motor Function (arm) 0  a Left   b Right   6 Motor Function (leg) 0  a Left   b Right   7 Limb Ataxia 0  8 Sensory 0  9 Language 0  10 Articulation 0  11 Extinction/Inattention 0    Score: 1    ANCILLARY DATA REVIEWED:     Lab Data Review:  Recent Results (from the past 24 hour(s))   EC-ECHOCARDIOGRAM COMPLETE W/O CONT    Collection Time: 08/30/22  5:05 PM   Result Value Ref Range    Eject.Frac. MOD BP 64.89     Eject.Frac. MOD 4C 65.29     Eject.Frac. MOD 2C 67.25     Left Ventrical Ejection Fraction 60         Labs reviewed by me.     Records reviewed:       Imaging reviewed by me:     EC-ECHOCARDIOGRAM COMPLETE W/O CONT   Final Result      CT-CTA NECK WITH & W/O-POST PROCESSING   Final Result      CT angiogram of the neck within normal limits.      CT-CTA HEAD WITH & W/O-POST PROCESS   Final Result      1.  Moderate atherosclerotic plaquing and ectasia of the V4 segment of the left vertebral artery.      2.  Otherwise unremarkable CT angiogram of the head.      OUTSIDE IMAGES-MR BRAIN   Final Result      OUTSIDE IMAGES-CT HEAD   Final Result          Presumed mechanism by TOAST:  __Large Artery Atherosclerosis  __Small Vessel (Lacunar)  __Cardioembolic  __Other (Sickle Cell, Vasculitis, Hypercoagulable)  __Unknown    Modified Lizet Scale (MRS): 1 = No significant disability, despite symptoms; able to perform all usual duties and activities      ASSESSMENT AND PLAN:    Patient is a 72 yo female with PMH of HTN, asthma, and SVT presenting with right sided hemianopsia. Patient is being followed by Neurology with findings of acute Left occipital infarct with petechial hemorrhagic conversion. Cranial CT Scan and Cranial MRI done at [facility] reviewed. Transthoracic Echocardiogram 08/31/22 as part of embolic work up revealed mildly dilated LA and LVEF of 60%, no shunts noted.  CT Angiogram of Head and Neck done on 08/31/222 revealed moderate atherosclerotic plaque of V4 segment of Left vertebral artery.     Plan:  -No objection to discharge  -Maintain patient's blood pressure on the normotensive stage, patient's blood pressure currently at 116-118/67 (with Losartan and Diltiazem )  -Continue ASA 81 mg  OD and Atorvastatin 80 mg OD (with LDL level goal of <70 mg/dL) as outpatient for secondary stroke prevention  -Long term cardiac monitoring prior to discharge   -[facility] follow up       Nettie Cruz M.D.  [facility]

## 2022-08-31 NOTE — CARE PLAN
The patient is Stable - Low risk of patient condition declining or worsening    Shift Goals  Clinical Goals: Monitor neuro status  Patient Goals: Sleep  Family Goals: aisha    Progress made toward(s) clinical / shift goals:      Problem: Neuro Status  Goal: Neuro status will remain stable or improve  Outcome: Progressing    Q4H neuro checks in place. Pt's neurological status (A/Ox4) remains unchanged throughout shift. Bed alarm is on, call light within reach.     Patient is not progressing towards the following goals:

## 2022-08-31 NOTE — DISCHARGE INSTRUCTIONS
Stroke / CVA / TIA / Hemorrhagic Ischemia Discharge Instructions    You have had a stroke. Your risk factors have been identified as follows:  Age - Over 55  High blood pressure  Artery Disease / Peripheral Vascular Disease     It is important that you reduce your risk factors to avoid another stroke in the future. Here are some general guidelines to follow:    Eat healthy - avoid food high in fat  Maintain a healthy weight  Avoid alcohol and illegal drug use Get regular exercise  Avoid smoking  Take your medications as directed     For more information regarding risk factors, refer to pages 17-19 in your Stroke Patient Education Guide. Stroke Education Guide was given to patient.    Warning signs of a stroke include (which can also be found on page 3 of your Stroke Patient Education Guide):  Sudden numbness of weakness of the face, arm or leg (especially on one side of the body).  Sudden confusion, trouble speaking or understanding.  Sudden trouble seeing in one or both eyes.  Sudden trouble walking, dizziness, loss of balance or coordination.  Sudden severe headache with no known cause.  It is very important to get treatment quickly when a stroke occurs. If you experience any of the above warning signs, call 911 immediately.     Some patients who have had a stroke will be going home on a blood thinner medication called Warfarin (Coumadin).  This medication requires very close monitoring and follow up.  This follow up can be provided by either your Primary Care Physician or by [facility]'s Outpatient Anticoagulation Service.  The Outpatient Anticoagulation Service is located at the [place] [facility] at [facility] ([facility]).  If you do not know when your follow up appointment is scheduled, call [phone] to verify your appointment time.

## 2022-08-31 NOTE — PROGRESS NOTES
Monitor summary: SR 67-90, NC .19, QRS .10, QT .41, with rare PVCs and couplets sper strip from monitor room.

## 2022-08-31 NOTE — PROGRESS NOTES
MS: rhythm: SR/ST, HR 70s-100s, WA 0.17/QRS0.08/QT0.37 with frequent pvc, rare pac and couplet pvc per strip from monitor room

## 2022-09-01 ENCOUNTER — PATIENT OUTREACH (OUTPATIENT)
Dept: MEDICAL GROUP | Facility: MEDICAL CENTER | Age: 72
End: 2022-09-01
Payer: MEDICARE

## 2022-09-01 NOTE — PROGRESS NOTES
Telemedicine Video Visit: Established Patient   This Remote Face to Face encounter was conducted via Zoom. Given the importance of social distancing and other strategies recommended to reduce the risk of COVID-19 transmission, I am providing medical care to this patient via audio/video visit in place of an in person visit at the request of the patient. Verbal consent to telehealth, risks, benefits, and consequences were discussed. Patient retains the right to withdraw at any time. All existing confidentiality protections apply. The patient has access to all transmitted medical information. No dissemination of any patient images or information to other entities without further written consent.      Ezio Mora is a 71 y.o. female who presents for Hospital Follow-up.    POST DISCHARGE CALL:  Discharge Date:8/31/2022   Date of Outreach Call: 9/1/2022 12:03 PM  Now that you're home, how are you doing? Good  Did you receive any new prescriptions? Yes  Were you able to fill those medications? Yes  How did you fill those prescriptions? Pharmacy  If not able to fill prescriptions, why? *    Do you have questions about your medications? No  Do you have a follow-up appointment scheduled?Yes  Any issues or paperwork you wish to discuss with your PCP? No  Does patient qualify for CCM Program? No  If patient qualifies, was CCM Program Introduced? *  If patient does not qualify, comment? *  Number of Attempts: 2  Current or previous attempts completed within two business days of discharge? Yes  Provided education regarding treatment plan, medication, self-management, ADLs? Yes  Has patient completed Advance Directive? If yes, advise them to bring to appointment. *  Care Manager phone number provided? Yes  Is there anything else I can help you with? No  Chief Complaint? Acute CVA  Admitting Dx? Acute CVA  Discharge Diagnosis? Acute CVA  Additional Comments? *    HPI:   Recently hospitalized for acute stroke    Patient  was admitted to [facility] on 8/29, presented with right hemianopsia from [facility] [place].  She had CT scan and MRI were done at [facility]. CT head was negative for acute abnormalities and the MRI demonstrated left occipital lobe infarct with petechial hemorrhage.  So patient was transferred to [facility].  Patient was evaluated by neurology who recommended to continue aspirin, statin and aim for normotensive pressures. While at [facility], CT head and neck demonstrated moderate atherosclerotic plaque of the left vertebral artery.  Echo was also performed in which normal ejection fraction, normal right atrium and mildly dilated left atrium was demonstrated.Neurology recommended patient to be discharge with Zio patch ordered and placed on patient prior to discharge.  Patient was also advised to closely follow-up with Neurology, and Cardiology .  Patient was discharged on 8/31  During this Zoom visit, patient stated that he has been doing fine, however still having a problem with visual field on the right side.  But it has not been deteriorating.  Patient has an appointment with stroke clinic, cardiology.  Requested referral to neuro-ophthalmology for more evaluation as recommended by neurology.    Current medicines (including reconciliation performed today)  Current Outpatient Medications   Medication Sig Dispense Refill    aspirin EC 81 MG EC tablet Take 1 Tablet by mouth every day. 30 Tablet 0    atorvastatin (LIPITOR) 80 MG tablet Take 1 Tablet by mouth every evening. 30 Tablet 0    DILTIAZem (CARDIZEM) 120 MG Tab Take 2 Tablets by mouth every day. 30 Tablet 0    DILTIAZem CD (CARDIZEM CD) 240 MG CAPSULE SR 24 HR Take 240 mg by mouth every day.      Magnesium Citrate 100 MG Tab Take  by mouth every day.      Calcium Carbonate 1500 (600 Ca) MG Tab Take  by mouth every day.      traZODone (DESYREL) 50 MG Tab Take 1 Tablet by mouth every evening. 30 Tablet 3    losartan (COZAAR) 25 MG  Tab Take 25 mg by mouth every day.      vitamin D (VITAMIND D3) 1000 UNIT Tab Take 1,000 Units by mouth every day.      fluticasone-salmeterol (WIXELA INHUB) 100-50 MCG/DOSE AEROSOL POWDER, BREATH ACTIVATED Inhale 1 Puff 2 times a day. Rince mouth after use 1 Each 11    montelukast (SINGULAIR) 10 MG Tab TAKE 1 TABLET BY MOUTH EVERY DAY 90 Tablet 3    albuterol (PROAIR HFA) 108 (90 Base) MCG/ACT Aero Soln inhalation aerosol Inhale 2 Puffs by mouth every 6 hours as needed. 8.5 g 3     No current facility-administered medications for this visit.       Allergies:   Lisinopril and Terramycin [oxytetracycline]    Social History     Tobacco Use    Smoking status: Never    Smokeless tobacco: Never   Vaping Use    Vaping Use: Never used   Substance Use Topics    Alcohol use: Yes     Alcohol/week: 0.6 - 1.2 oz     Types: 1 - 2 Glasses of wine per week     Comment: occ wine    Drug use: No       ROS:      Objective:     There were no vitals filed for this visit.  There is no height or weight on file to calculate BMI.    Physical Exam:    /79     Constitutional: Alert, no distress, well-groomed.  Skin: No rashes in visible areas.  Eye: Round. Conjunctiva clear, lids normal. No icterus.   ENMT: Lips pink without lesions, good dentition, moist mucous membranes. Phonation normal.  Neck: No masses, no thyromegaly. Moves freely without pain.  CV: Pulse as reported by patient  Respiratory: Unlabored respiratory effort, no cough or audible wheeze  Psych: Alert and oriented x3, normal affect and mood.     Assessment and Plan:     1. Hospital discharge follow-up  Discussion reviewed.    2. Acute CVA (cerebrovascular accident) (HCC)  Stable.  No deterioration, no new symptoms  Continue on aspirin 81 mg, continue atorvastatin 80 mg daily  Continue follow-up with neurology and neuro-ophthalmology    - Referral to Neuro Ophthalmology  - atorvastatin (LIPITOR) 80 MG tablet; Take 1 Tablet by mouth every evening.  Dispense: 90 Tablet;  Refill: 2  - DILTIAZem (CARDIZEM) 120 MG Tab; Take 1 Tablet by mouth 2 (two) times a day.  Dispense: 180 Tablet; Refill: 2    3. Essential hypertension  Controlled.    Continue on Cardizem 120 mg twice a day    - DILTIAZem (CARDIZEM) 120 MG Tab; Take 1 Tablet by mouth 2 (two) times a day.  Dispense: 180 Tablet; Refill: 2            - Chart and discharge summary were reviewed.   - Hospitalization and results reviewed with patient.   - Medications reviewed including instructions regarding high risk medications, dosing and side effects.  - Recommended Services:   - Advance directive/POLST on file?      Follow-up:No follow-ups on file.    Face-to-face transitional care management services with  medical decision complexity were provided.     LACE+ Historical Score Over Time (0-28: Low, 29-58: Medium, 59+: High): 50

## 2022-09-01 NOTE — DISCHARGE SUMMARY
[facility] Internal Medicine Discharge Summary    Attending: Dr. Whitaker  Senior Resident: Dr. Adames  Intern:  Dr. Paredes   Contact Number: [phone]    CHIEF COMPLAINT ON ADMISSION  Acute vision changes    Reason for Admission  Acute vertebral stroke     Admission Date  8/29/2022    CODE STATUS  Prior    HPI & HOSPITAL COURSE    Ms. Ezio Mora is a 72 yo female with PMH of HTN, asthma, and SVT who presented with right hemianopsia from [facility].     Patient had CT scan and MRI were done at [facility]. CT head was negative for acute abnormalities and the MRI demonstrated left occipital lobe infarct with petechial hemorrhage. When the patient arrived to [facility], and Neurology evaluated the patient and recommended to continue aspirin, statin and aim for normotensive pressures. While at [facility], CT head and neck demonstrated moderate atherosclerotic plaque of the left vertebral artery.  Echo was also performed in which normal ejection fraction, normal right atrium and mildly dilated left atrium was demonstrated.    After discussion with Neurology and the patient, decision was made to proceed with discharge with Zio patch ordered and placed on patient prior to discharge.  Patient was also advised to closely follow-up with Neurology, Cardiology and Primary care provider.    Therefore, she is discharged in fair and stable condition to home with close outpatient follow-up.    The patient met 2-midnight criteria for an inpatient stay at the time of discharge.    Discharge Date  8/31/2022    Physical Exam on Day of Discharge  Physical Exam  Vitals and nursing note reviewed.   Constitutional:       Appearance: Normal appearance. She is not toxic-appearing.   HENT:      Head: Normocephalic and atraumatic.      Right Ear: External ear normal.      Left Ear: External ear normal.      Nose: No rhinorrhea.   Eyes:      Extraocular Movements: Extraocular movements intact.      Conjunctiva/sclera: Conjunctivae  normal.   Cardiovascular:      Rate and Rhythm: Normal rate and regular rhythm.      Heart sounds: No murmur heard.    No gallop.   Pulmonary:      Breath sounds: Normal breath sounds. No wheezing or rales.   Abdominal:      General: Bowel sounds are normal. There is no distension.      Tenderness: There is no abdominal tenderness.   Musculoskeletal:      Cervical back: Neck supple.      Right lower leg: No edema.      Left lower leg: No edema.   Skin:     Capillary Refill: Capillary refill takes 2 to 3 seconds.      Coloration: Skin is not jaundiced.   Neurological:      Mental Status: She is oriented to person, place, and time.      Motor: No weakness.      Comments: Right inferior quadrantanopsia   Psychiatric:         Mood and Affect: Mood normal.       FOLLOW UP ITEMS POST DISCHARGE  -[facility] follow-up  -Titration of blood pressure medications    DISCHARGE DIAGNOSES  Principal Problem:    Acute CVA (cerebrovascular accident) ([facility]) POA: Yes  Active Problems:    PSVT (paroxysmal supraventricular tachycardia) ([facility]) POA: Yes      Overview: IMO load March 2020    Essential hypertension POA: Yes      Overview: Last Assessment & Plan:       Formatting of this note might be different from the original.      Controlled.    Asthma POA: Unknown  Resolved Problems:    * No resolved hospital problems. *      FOLLOW UP  No future appointments.  No follow-up provider specified.    MEDICATIONS ON DISCHARGE     Medication List        START taking these medications        Instructions   aspirin 81 MG EC tablet  Start taking on: September 1, 2022   Take 1 Tablet by mouth every day.  Dose: 81 mg     atorvastatin 80 MG tablet  Commonly known as: LIPITOR   Take 1 Tablet by mouth every evening.  Dose: 80 mg            CHANGE how you take these medications        Instructions   DILTIAZem 120 MG Tabs  What changed:   medication strength  how much to take  when to take this  Commonly known as: CARDIZEM   Doctor's comments:  **Patient requests 90 days supply**  Take 2 Tablets by mouth every day.  Dose: 240 mg            CONTINUE taking these medications        Instructions   albuterol 108 (90 Base) MCG/ACT Aers inhalation aerosol  Commonly known as: ProAir HFA   Inhale 2 Puffs by mouth every 6 hours as needed.  Dose: 2 Puff     Calcium Carbonate 1500 (600 Ca) MG Tabs   Take  by mouth every day.     DILTIAZem  MG Cp24  Commonly known as: CARDIZEM CD   Take 240 mg by mouth every day.  Dose: 240 mg     fluticasone-salmeterol 100-50 MCG/DOSE Aepb  Commonly known as: Wixela Inhub   Inhale 1 Puff 2 times a day. Rince mouth after use  Dose: 1 Puff     losartan 25 MG Tabs  Commonly known as: COZAAR   Take 25 mg by mouth every day.  Dose: 25 mg     Magnesium Citrate 100 MG Tabs   Take  by mouth every day.     montelukast 10 MG Tabs  Commonly known as: SINGULAIR   TAKE 1 TABLET BY MOUTH EVERY DAY     traZODone 50 MG Tabs  Commonly known as: DESYREL   Take 1 Tablet by mouth every evening.  Dose: 50 mg     vitamin D 1000 UNIT Tabs  Commonly known as: VITAMIND D3   Take 1,000 Units by mouth every day.  Dose: 1,000 Units            STOP taking these medications      methylPREDNISolone 4 MG Tbpk  Commonly known as: MEDROL DOSEPAK              Allergies  Allergies   Allergen Reactions    Lisinopril Unspecified    Terramycin [Oxytetracycline]      Rash as a child        DIET  No orders of the defined types were placed in this encounter.      ACTIVITY  As tolerated.  Weight bearing as tolerated    CONSULTATIONS  Neurology    PROCEDURES  CTA head and neck  Echocardiogram    LABORATORY  Lab Results   Component Value Date    SODIUM 139 08/30/2022    POTASSIUM 4.0 08/30/2022    CHLORIDE 105 08/30/2022    CO2 23 08/30/2022    GLUCOSE 100 (H) 08/30/2022    BUN 11 08/30/2022    CREATININE 0.60 08/30/2022    GLOMRATE 66 02/22/2022        Lab Results   Component Value Date    WBC 6.7 08/30/2022    HEMOGLOBIN 14.2 08/30/2022    HEMATOCRIT 42.0 08/30/2022     PLATELETCT 324 08/30/2022        Total time of the discharge process exceeds 35 minutes.

## 2022-09-01 NOTE — PROGRESS NOTES
"Patient returned this RN's call to complete TCM call. Patient states she is \"doing well, just tired, may have overdone it today\". States she is waiting for a call from [facility]. Patient picked up her medications. Patient has no questions on her medications or discharge instructions. This RN scheduled patient hospital follow-up appointment with PCP for 9/6/2022@2:00pm. Patient states she is going to rest and take it easy the rest of the week. No other immediate needs or concerns to address at this time.   "

## 2022-09-06 ENCOUNTER — TELEMEDICINE (OUTPATIENT)
Dept: MEDICAL GROUP | Facility: MEDICAL CENTER | Age: 72
End: 2022-09-06
Payer: MEDICARE

## 2022-09-06 VITALS — DIASTOLIC BLOOD PRESSURE: 79 MMHG | SYSTOLIC BLOOD PRESSURE: 120 MMHG

## 2022-09-06 DIAGNOSIS — Z09 HOSPITAL DISCHARGE FOLLOW-UP: ICD-10-CM

## 2022-09-06 DIAGNOSIS — I63.9 ACUTE CVA (CEREBROVASCULAR ACCIDENT) (HCC): ICD-10-CM

## 2022-09-06 DIAGNOSIS — I10 ESSENTIAL HYPERTENSION: ICD-10-CM

## 2022-09-06 PROCEDURE — 99214 OFFICE O/P EST MOD 30 MIN: CPT | Mod: 95 | Performed by: FAMILY MEDICINE

## 2022-09-06 RX ORDER — DILTIAZEM HYDROCHLORIDE 120 MG/1
120 TABLET, FILM COATED ORAL
Qty: 180 TABLET | Refills: 2 | Status: SHIPPED | OUTPATIENT
Start: 2022-09-06 | End: 2022-09-14

## 2022-09-06 RX ORDER — ATORVASTATIN CALCIUM 80 MG/1
80 TABLET, FILM COATED ORAL EVERY EVENING
Qty: 90 TABLET | Refills: 2 | Status: SHIPPED | OUTPATIENT
Start: 2022-09-06 | End: 2022-11-14

## 2022-09-07 ENCOUNTER — TELEPHONE (OUTPATIENT)
Dept: CARDIOLOGY | Facility: MEDICAL CENTER | Age: 72
End: 2022-09-07
Payer: MEDICARE

## 2022-09-07 ENCOUNTER — TELEPHONE (OUTPATIENT)
Dept: NEUROLOGY | Facility: MEDICAL CENTER | Age: 72
End: 2022-09-07

## 2022-09-07 DIAGNOSIS — I47.20 VENTRICULAR TACHYCARDIA (HCC): ICD-10-CM

## 2022-09-07 DIAGNOSIS — G43.709 CHRONIC MIGRAINE WITHOUT AURA WITHOUT STATUS MIGRAINOSUS, NOT INTRACTABLE: ICD-10-CM

## 2022-09-07 DIAGNOSIS — I48.0 PAROXYSMAL ATRIAL FIBRILLATION (HCC): ICD-10-CM

## 2022-09-07 NOTE — TELEPHONE ENCOUNTER
Eliqius 5mg Tablet    Request rec'd via MS[facility], unable to RTC as [facility] is down, PA not req'd notifying liaison Vanessa Anders - unsure of copay. - 09/07/2022 2:06pm

## 2022-09-07 NOTE — TELEPHONE ENCOUNTER
I just spoke with her, started eliquis, she will follow with cardiology.      Nicol Bass, MSN, FNP  Nurse Practitioner  [facility] [place]   Instructor of Clinical Neurology  [facility].  [phone] Phone  [phone] Fax

## 2022-09-07 NOTE — TELEPHONE ENCOUNTER
Referral to cardiology already placed. Pt to see DA on 10/26/22    To ordering provider in Neuro.

## 2022-09-07 NOTE — PROGRESS NOTES
I spoke with patient, atrial fibrillation confirmed on ZIO, she did have lightheadedness that corresponded to the Afib on 9/6/2022.    She already has cardiology apt    Instructed to stop ASA 81mg and start Eliquis 5mg BID    Discussed signs of bleeding.    Avoid NSAIDS, acetaminophen only for pain.    She verbalizes understanding.    Rx sent to Tri Bass, MSN, FNP  Nurse Practitioner  [facility] [place]   Instructor of Clinical Neurology  [facility].  [phone] Phone  [phone] Fax

## 2022-09-07 NOTE — TELEPHONE ENCOUNTER
LAMONT (ADD)      Caller: Ezio Mora    Medication Name and Dosage: apixaban (ELIQUIS) 5mg Tab      Did patient contact pharmacy (If no, please call pharmacy first): yes  Medication amount left: 0  Preferred Pharmacy: [facility] in [place]  Other questions (Topic): n/a  Callback Number (Will only call for issues): [phone] (home)       Thank you      -Finn SAENZ

## 2022-09-16 ENCOUNTER — TELEPHONE (OUTPATIENT)
Dept: CARDIOLOGY | Facility: MEDICAL CENTER | Age: 72
End: 2022-09-16
Payer: MEDICARE

## 2022-09-19 PROCEDURE — 93248 EXT ECG>7D<15D REV&INTERPJ: CPT | Performed by: INTERNAL MEDICINE

## 2022-10-06 ENCOUNTER — TELEPHONE (OUTPATIENT)
Dept: CARDIOLOGY | Facility: MEDICAL CENTER | Age: 72
End: 2022-10-06
Payer: MEDICARE

## 2022-10-06 NOTE — TELEPHONE ENCOUNTER
Spoke to patient in regards to records for NP appointment with Dr. Gomez. Patient has never been treated by a cardiologist, all recent records in epic including blood work, cardiac testing, and EKG. Confirmed appt date, time and location.    Patient states she had an EKG at  [facility].

## 2022-10-27 ENCOUNTER — TELEMEDICINE (OUTPATIENT)
Dept: CARDIOLOGY | Facility: MEDICAL CENTER | Age: 72
End: 2022-10-27
Attending: NURSE PRACTITIONER
Payer: MEDICARE

## 2022-10-27 VITALS
DIASTOLIC BLOOD PRESSURE: 72 MMHG | SYSTOLIC BLOOD PRESSURE: 110 MMHG | WEIGHT: 137 LBS | BODY MASS INDEX: 26.9 KG/M2 | HEIGHT: 60 IN

## 2022-10-27 DIAGNOSIS — I63.9 ACUTE CVA (CEREBROVASCULAR ACCIDENT) (HCC): ICD-10-CM

## 2022-10-27 DIAGNOSIS — I48.91 ATRIAL FIBRILLATION, NEW ONSET (HCC): ICD-10-CM

## 2022-10-27 DIAGNOSIS — I48.0 PAROXYSMAL ATRIAL FIBRILLATION (HCC): ICD-10-CM

## 2022-10-27 DIAGNOSIS — I47.29 NONSUSTAINED VENTRICULAR TACHYCARDIA (HCC): ICD-10-CM

## 2022-10-27 DIAGNOSIS — D68.69 HYPERCOAGULABLE STATE DUE TO ATRIAL FIBRILLATION (HCC): ICD-10-CM

## 2022-10-27 DIAGNOSIS — I48.91 HYPERCOAGULABLE STATE DUE TO ATRIAL FIBRILLATION (HCC): ICD-10-CM

## 2022-10-27 DIAGNOSIS — I10 ESSENTIAL HYPERTENSION: ICD-10-CM

## 2022-10-27 DIAGNOSIS — I47.10 PAROXYSMAL SUPRAVENTRICULAR TACHYCARDIA (HCC): ICD-10-CM

## 2022-10-27 DIAGNOSIS — Z79.01 CHRONIC ANTICOAGULATION: ICD-10-CM

## 2022-10-27 PROCEDURE — 99204 OFFICE O/P NEW MOD 45 MIN: CPT | Mod: 95 | Performed by: INTERNAL MEDICINE

## 2022-10-27 ASSESSMENT — ENCOUNTER SYMPTOMS
IRREGULAR HEARTBEAT: 0
COUGH: 0
DIAPHORESIS: 0
HEADACHES: 0
WEAKNESS: 1
CONSTIPATION: 0
DIZZINESS: 0
BLURRED VISION: 0
DYSPNEA ON EXERTION: 0
SLEEP DISTURBANCES DUE TO BREATHING: 0
EXCESSIVE DAYTIME SLEEPINESS: 0
FALLS: 0
DIARRHEA: 0
SYNCOPE: 0
NUMBNESS: 0
WHEEZING: 0
ORTHOPNEA: 0
MYALGIAS: 0
PARESTHESIAS: 0
LOSS OF BALANCE: 0
SHORTNESS OF BREATH: 0
BACK PAIN: 0
FEVER: 0
PALPITATIONS: 1
NAUSEA: 0
FLANK PAIN: 0
NIGHT SWEATS: 0
VOMITING: 0
SORE THROAT: 0
LIGHT-HEADEDNESS: 0
NEAR-SYNCOPE: 0
PND: 0
BLOATING: 0
DOUBLE VISION: 0
DECREASED APPETITE: 0

## 2022-10-27 ASSESSMENT — FIBROSIS 4 INDEX: FIB4 SCORE: 0.82

## 2022-10-27 NOTE — PROGRESS NOTES
Cardiology Telemedicine Visit Initial Consultation Note    Date of note:    10/27/2022    Primary Care Provider: Keke Casas M.D.  Referring Provider: Nicol Bass A.P.R.*     Patient Name: Ezio Mora   YOB: 1950  MRN:              [id number]      This evaluation was conducted via Zoom, using secure and encrypted videoconferencing technology.    The patient was at home in the [facility].  The patient's identity was confirmed and verbal consent for the telemedicine encounter was obtained.       Chief Complaint   Patient presents with    Palpitations       History of Present Illness: Ms. Ezio Mora is a 72 y.o. female whose current medical problems include CVA in 8/2022, newly diagnosed Afib, now on Eliquis, HTN and SVT who is here for cardiac consultation for newly diagnosed Afib and CVA.    Patient states that in August 2022 she woke up with flashing lights.  Urgently saw her ophthalmologist who recommended ER evaluation.  CT and MRI head were done at [facility].  CT was unremarkable but MRI demonstrated left occipital lobe infarct with petechial hemorrhage.  She was urgently transferred to [facility] for higher level of care.  CT head/neck showed moderate atherosclerotic plaque of left vertebral artery.  Echocardiogram was unremarkable except for mildly dilated left atrium.  She was discharged home with Zio patch monitor.    Has history of SVT and nonsustained VT and has been on diltiazem 120 mg for several years.  In regards to nonsustained VT, was noted on treadmill stress test.  Underwent coronary angiogram which was negative for obstructive CAD.    For the past few months, she was having several breakthrough episodes and diltiazem was increased to 120 mg twice daily.  While wearing the Zio patch monitor she had her previous episode of tachycardia and triggered the device.  Found to be in new onset atrial fibrillation with RVR.  Self converted.  Was  started on anticoagulation with Eliquis 5 mg twice daily which she has been tolerating well.    Continues to have breakthrough episodes of palpitations which are short-lived.  Has noticed an increase in fatigue and is wondering if this is from high-intensity statin medication.    In terms of physical activity, is active and able to perform her ADLs without any limiting factors.  Has residual loss of peripheral vision in left eye otherwise no impairment from CVA.    Cardiovascular Risk Factors:  1. Smoking status: Never smoker  2. Type II Diabetes Mellitus: No  Lab Results   Component Value Date/Time    HBA1C 5.6 08/30/2022 03:09 AM     3. Hypertension: Yes  4. Dyslipidemia: Yes   Cholesterol,Tot   Date Value Ref Range Status   08/30/2022 226 (H) 100 - 199 mg/dL Final     LDL   Date Value Ref Range Status   08/30/2022 112 (H) <100 mg/dL Final     HDL   Date Value Ref Range Status   08/30/2022 103 >=40 mg/dL Final     Triglycerides   Date Value Ref Range Status   08/30/2022 56 0 - 149 mg/dL Final     5. Family history of early Coronary Artery Disease in a first degree relative (Male less than 55 years of age; Female less than 65 years of age): Denies      Review of Systems   Constitutional: Positive for malaise/fatigue. Negative for decreased appetite, diaphoresis, fever and night sweats.   HENT:  Negative for congestion and sore throat.    Eyes:  Positive for visual disturbance. Negative for blurred vision and double vision.   Cardiovascular:  Positive for palpitations. Negative for chest pain, cyanosis, dyspnea on exertion, irregular heartbeat, leg swelling, near-syncope, orthopnea, paroxysmal nocturnal dyspnea and syncope.   Respiratory:  Negative for cough, shortness of breath, sleep disturbances due to breathing and wheezing.    Endocrine: Negative for cold intolerance and heat intolerance.   Musculoskeletal:  Negative for back pain, falls and myalgias.   Gastrointestinal:  Negative for bloating, constipation,  diarrhea, nausea and vomiting.   Genitourinary:  Negative for dysuria and flank pain.   Neurological:  Positive for weakness. Negative for excessive daytime sleepiness, dizziness, headaches, light-headedness, loss of balance, numbness and paresthesias.         Past Medical History:   Diagnosis Date    ASTHMA     Chronic fatigue 9/28/2016    Degenerative arthritis of spine     Mild intermittent asthma with acute exacerbation 6/18/2015    Post-menopause on HRT (hormone replacement therapy)     Pulmonary nodule 6/28/2016    Pulmonary, stable CT 8/15    Rheumatic fever     Weight gain 6/28/2016         Past Surgical History:   Procedure Laterality Date    FUSION, SPINE, LUMBAR, PLIF      PRIMARY C SECTION      TUBAL LIGATION           Current Outpatient Medications   Medication Sig Dispense Refill    metoprolol tartrate (LOPRESSOR) 25 MG Tab Take 1 Tablet by mouth 2 times a day as needed (palpitations). 60 Tablet 3    DILTIAZem (CARDIZEM) 120 MG Tab TAKE 2 TABLETS BY MOUTH EVERY DAY (Patient taking differently: 240 mg every day.) 180 Tablet 2    apixaban (ELIQUIS) 5mg Tab Take 1 Tablet by mouth 2 times a day. 60 Tablet 1    atorvastatin (LIPITOR) 80 MG tablet Take 1 Tablet by mouth every evening. 90 Tablet 2    Magnesium Citrate 100 MG Tab Take  by mouth every day.      Calcium Carbonate 1500 (600 Ca) MG Tab Take  by mouth every day.      traZODone (DESYREL) 50 MG Tab Take 1 Tablet by mouth every evening. 30 Tablet 3    losartan (COZAAR) 25 MG Tab Take 25 mg by mouth every day.      vitamin D (VITAMIND D3) 1000 UNIT Tab Take 1,000 Units by mouth every day.      fluticasone-salmeterol (WIXELA INHUB) 100-50 MCG/DOSE AEROSOL POWDER, BREATH ACTIVATED Inhale 1 Puff 2 times a day. Rince mouth after use 1 Each 11    montelukast (SINGULAIR) 10 MG Tab TAKE 1 TABLET BY MOUTH EVERY DAY 90 Tablet 3    albuterol (PROAIR HFA) 108 (90 Base) MCG/ACT Aero Soln inhalation aerosol Inhale 2 Puffs by mouth every 6 hours as needed. 8.5 g 3      No current facility-administered medications for this visit.         Allergies   Allergen Reactions    Lisinopril Unspecified    Terramycin [Oxytetracycline]      Rash as a child          Family History   Problem Relation Age of Onset    Other Mother         global decline due to age    Heart Disease Father     Thyroid Child          Social History     Socioeconomic History    Marital status:      Spouse name: Not on file    Number of children: Not on file    Years of education: Not on file    Highest education level: Not on file   Occupational History    Not on file   Tobacco Use    Smoking status: Never    Smokeless tobacco: Never   Vaping Use    Vaping Use: Never used   Substance and Sexual Activity    Alcohol use: Yes     Alcohol/week: 0.6 - 1.2 oz     Types: 1 - 2 Glasses of wine per week     Comment: occ wine    Drug use: No    Sexual activity: Not on file   Other Topics Concern    Not on file   Social History Narrative         Social Determinants of Health     Financial Resource Strain: Not on file   Food Insecurity: Not on file   Transportation Needs: Not on file   Physical Activity: Not on file   Stress: Not on file   Social Connections: Not on file   Intimate Partner Violence: Not on file   Housing Stability: Not on file         Physical Exam:  Vitals as provided by the patient   /72 (BP Location: Left arm, Patient Position: Sitting, BP Cuff Size: Adult)   Ht 1.524 m (5')   Wt 62.1 kg (137 lb)    Oxygen Therapy:     BP Readings from Last 4 Encounters:   10/27/22 110/72   09/06/22 120/79   08/31/22 116/67   08/23/22 130/70         Weight/BMI: Body mass index is 26.76 kg/m².  Wt Readings from Last 4 Encounters:   10/27/22 62.1 kg (137 lb)   08/29/22 62.5 kg (137 lb 12.6 oz)   08/23/22 63.8 kg (140 lb 10.5 oz)   03/24/22 66.9 kg (147 lb 8 oz)       Physical Exam:  General: No acute distress. Well nourished.   HEENT: EOM grossly intact, no scleral icterus, no pharyngeal erythema.    Neck:  No JVD noted at 90 degrees, trachea midline  CVS: Pulse as reported by patient, no visible LE edema.  Resp: Unlabored respiratory effort, no cough or audible wheeze  MSK/Ext: No clubbing or cyanosis visible appreciated.  Skin: No rashes in visible areas.  Neurological: CN III-XII grossly intact. No focal deficits.   Psych: A&O x 3, appropriate affect, good judgement, well groomed      Lab Data Review:  Lab Results   Component Value Date/Time    CHOLSTRLTOT 226 (H) 08/30/2022 03:09 AM     (H) 08/30/2022 03:09 AM     08/30/2022 03:09 AM    TRIGLYCERIDE 56 08/30/2022 03:09 AM       Lab Results   Component Value Date/Time    SODIUM 139 08/30/2022 03:09 AM    POTASSIUM 4.0 08/30/2022 03:09 AM    CHLORIDE 105 08/30/2022 03:09 AM    CO2 23 08/30/2022 03:09 AM    GLUCOSE 100 (H) 08/30/2022 03:09 AM    BUN 11 08/30/2022 03:09 AM    CREATININE 0.60 08/30/2022 03:09 AM    GLOMRATE 66 02/22/2022 10:34 AM     Lab Results   Component Value Date/Time    ALKPHOSPHAT 67 08/30/2022 03:09 AM    ASTSGOT 14 08/30/2022 03:09 AM    ALTSGPT 14 08/30/2022 03:09 AM    TBILIRUBIN 0.5 08/30/2022 03:09 AM      Lab Results   Component Value Date/Time    WBC 6.7 08/30/2022 03:09 AM     Lab Results   Component Value Date/Time    HBA1C 5.6 08/30/2022 03:09 AM         Cardiac Imaging and Procedures Review:    EKG dated 8/29/2022: My personal interpretation is sinus rhythm    Echo dated 8/30/2022:   My personal interpretation is normal left trickle size and function.  LVEF 60%, no valvular abnormality.  Mildly dilated LA    Outside Echo 6/25/2021:  The Ejection Fraction estimate is 60-65%   No regional wall motion abnormalities noted.   Normal diastolic function   The right ventricular systolic function is mildly reduced.   There is mild to moderate aortic regurgitation.   Doppler findings do not suggest pulmonary hypertension.   There is no pericardial effusion.     Holter monitor (9/16/2022):   New diagnosis of A. fib with  RVR  Episodes of nonsustained VT    Outside Holter Monitor (2/16/2022):   18 days for palpitations   sinus  (74)   1st degree heart block, no significant pauses   1% PVC, 5% PACs   No AF detected      Outside LHC at [facility] (2/25/2022):   · Nonobstructive CAD   · Successful LHC and coronary angiography via right radial approach.       Radiology test Review:  CTA neck 8/30/2022: Unremarkable    CTA head (8/30/2022): IMPRESSION:  1.  Moderate atherosclerotic plaquing and ectasia of the V4 segment of the left vertebral artery.   2.  Otherwise unremarkable CT angiogram of the head.      Assessment & Plan     1. Atrial fibrillation, new onset (HCC)  metoprolol tartrate (LOPRESSOR) 25 MG Tab    NM-CARDIAC STRESS TEST      2. Paroxysmal atrial fibrillation (HCC)  metoprolol tartrate (LOPRESSOR) 25 MG Tab    NM-CARDIAC STRESS TEST      3. PSVT (paroxysmal supraventricular tachycardia) (HCC)  NM-CARDIAC STRESS TEST      4. Hypercoagulable state due to atrial fibrillation (HCC)        5. Chronic anticoagulation        6. Essential hypertension        7. Acute CVA (cerebrovascular accident) (HCC)        8. Nonsustained ventricular tachycardia              Shared Medical Decision Making:  We discussed natural history, pathophysiology, treatment options and progression of atrial fibrillation.  She is symptomatic with palpitations and has been noticing more breakthrough episodes in the past few months.  He is currently on diltiazem 120 mg twice daily.  After discussion initiate metoprolol 25 mg as needed for palpitations that are sustaining greater than 15 minutes.    We also discussed initiation of class Ic antiarrhythmic agent for pill in the pocket approach.  Obtain nuclear cardiac stress test to rule out obstructive CAD prior to initiation of class Ic agent.    Tolerating anticoagulation without any bleeding concerns.  Is planning for a colonoscopy in the near future.  Okay to hold anticoagulation 2 to 3 days prior  to the procedure.    We also discussed EP referral for ablation procedure if symptoms are not controlled with medications to which she is in agreement with.  Does prefer less invasive approach.      A total of 61 minutes of time was spent on day of encounter reviewing medical record, performing history and examination, counseling, ordering medication/test/consults and documentation.    All of patient's excellent questions were answered to the best of my knowledge and to her satisfaction.  It was a pleasure seeing Ms. Ezio Mora in my clinic today. Return in about 7 weeks (around 12/15/2022). Patient is aware to call the cardiology clinic with any questions or concerns.      Ramon Gomez MD  [facility]  [facility], [address]  [address] [place] [address]  Phone: [phone]  Fax: [phone]

## 2022-11-03 ENCOUNTER — TELEPHONE (OUTPATIENT)
Dept: NEUROLOGY | Facility: MEDICAL CENTER | Age: 72
End: 2022-11-03
Payer: MEDICARE

## 2022-11-03 DIAGNOSIS — I48.0 PAROXYSMAL ATRIAL FIBRILLATION (HCC): ICD-10-CM

## 2022-11-04 ENCOUNTER — TELEPHONE (OUTPATIENT)
Dept: CARDIOLOGY | Facility: MEDICAL CENTER | Age: 72
End: 2022-11-04
Payer: MEDICARE

## 2022-11-04 DIAGNOSIS — I48.0 PAROXYSMAL ATRIAL FIBRILLATION (HCC): ICD-10-CM

## 2022-11-04 NOTE — TELEPHONE ENCOUNTER
Refill sent to pharmacy per pt request      apixaban (ELIQUIS) 5mg Tab 180 Tablet 3/3 11/4/2022 11/4/2023    Sig - Route: Take 1 Tablet by mouth 2 times a day. - Oral    Sent to pharmacy as: Apixaban 5 MG Oral Tablet (ELIQUIS)    Cosign for Ordering: Required by Ramon Gomez M.D.    E-Prescribing Status: Receipt confirmed by pharmacy (11/4/2022 12:10 PM PDT)      Pharmacy    [id number] - [place], [place] - [address] AT [facility] [address]

## 2022-11-04 NOTE — TELEPHONE ENCOUNTER
SHERRI    Caller: Aundrea Holguin    Medication Name and Dosage:    apixaban (ELIQUIS) 5mg Tab (Order #[id number]) on 9/7/22    Medication amount left: 0    Preferred Pharmacy:   [facility]    Other questions (Topic): N/A    Callback Number (Will only call for issues):   [phone]    Thank you,    Luna URBAN

## 2022-11-07 ENCOUNTER — TELEPHONE (OUTPATIENT)
Dept: NEUROLOGY | Facility: MEDICAL CENTER | Age: 72
End: 2022-11-07
Payer: MEDICARE

## 2022-11-07 NOTE — TELEPHONE ENCOUNTER
New Patient     [facility] Patient is checked in Patient Demographics? Yes    Is visit type and length correct?  Yes    Is referral attached to visit? Yes    Were records received from referring provider? Yes, referring provider is a [facility] provider so records are in [facility].    Patient was not contacted to have someone accompany them to visit?    Is this appointment scheduled as a Hospital Follow-Up?  Yes    Does the patient require any pre procedure or post procedure follow up? No    If any orders were placed at last visit or intended to be done for this visit do we have Results/Consult Notes? Yes, notes are in Epic.   Labs -  Recent labs are in [facility].   Imaging - Recent imaging done and results are in Epic.   Referrals - No referrals were ordered at last office visit.        10.  If patient appointment is for Botox - is order pended for provider? No

## 2022-11-14 ENCOUNTER — OFFICE VISIT (OUTPATIENT)
Dept: NEUROLOGY | Facility: MEDICAL CENTER | Age: 72
End: 2022-11-14
Attending: NURSE PRACTITIONER
Payer: MEDICARE

## 2022-11-14 VITALS
SYSTOLIC BLOOD PRESSURE: 138 MMHG | HEART RATE: 95 BPM | OXYGEN SATURATION: 95 % | HEIGHT: 60 IN | DIASTOLIC BLOOD PRESSURE: 62 MMHG | TEMPERATURE: 98 F | WEIGHT: 144.18 LBS | BODY MASS INDEX: 28.31 KG/M2

## 2022-11-14 DIAGNOSIS — I48.0 PAROXYSMAL ATRIAL FIBRILLATION (HCC): ICD-10-CM

## 2022-11-14 DIAGNOSIS — I69.30 LATE EFFECT OF STROKE: ICD-10-CM

## 2022-11-14 PROCEDURE — 99214 OFFICE O/P EST MOD 30 MIN: CPT | Performed by: NURSE PRACTITIONER

## 2022-11-14 PROCEDURE — 99212 OFFICE O/P EST SF 10 MIN: CPT | Performed by: NURSE PRACTITIONER

## 2022-11-14 RX ORDER — ATORVASTATIN CALCIUM 40 MG/1
40 TABLET, FILM COATED ORAL
Qty: 90 TABLET | Refills: 0 | Status: SHIPPED | OUTPATIENT
Start: 2022-11-14 | End: 2023-02-24 | Stop reason: SDUPTHER

## 2022-11-14 ASSESSMENT — FIBROSIS 4 INDEX: FIB4 SCORE: 0.82

## 2022-11-14 ASSESSMENT — ENCOUNTER SYMPTOMS
SPEECH CHANGE: 0
BLURRED VISION: 1
BLOOD IN STOOL: 0
WEAKNESS: 0
FOCAL WEAKNESS: 0
FALLS: 0
SENSORY CHANGE: 0
COUGH: 0
BRUISES/BLEEDS EASILY: 0
HEADACHES: 1

## 2022-11-14 NOTE — PROGRESS NOTES
Subjective       HPI      Lottieoh PIPPA Mora is a 72 y.o.  left handed female who presents to [facility] for evaluation of left occipital lobe infarct.  She presented to [facility] on 8/29/2022 with right visual field deficits.  NIHSS on admission unknown.     She was transferred to [facility] for occipital stroke with hemorrhagic infarct.     She was sent home with ZIO patch which showed Afib and was started on Eliquis.     She was not on Atorvastatin prior to stroke.    She is here alone today, she feels like there may be some deficits in far right visual fields. No new symptoms, she is tolerating Eliquis.      Review of Systems   HENT:  Negative for nosebleeds.    Eyes:  Positive for blurred vision.   Respiratory:  Negative for cough.    Cardiovascular:  Negative for chest pain.   Gastrointestinal:  Negative for blood in stool.   Genitourinary:  Negative for hematuria.   Musculoskeletal:  Negative for falls.   Neurological:  Positive for headaches. Negative for sensory change, speech change, focal weakness and weakness.   Endo/Heme/Allergies:  Does not bruise/bleed easily.        Current Outpatient Medications on File Prior to Visit   Medication Sig Dispense Refill    apixaban (ELIQUIS) 5mg Tab Take 1 Tablet by mouth 2 times a day. 180 Tablet 3    metoprolol tartrate (LOPRESSOR) 25 MG Tab Take 1 Tablet by mouth 2 times a day as needed (palpitations). 60 Tablet 3    DILTIAZem (CARDIZEM) 120 MG Tab TAKE 2 TABLETS BY MOUTH EVERY DAY (Patient taking differently: 2 Tablets every day.) 180 Tablet 2    atorvastatin (LIPITOR) 80 MG tablet Take 1 Tablet by mouth every evening. 90 Tablet 2    Magnesium Citrate 100 MG Tab Take  by mouth every day.      Calcium Carbonate 1500 (600 Ca) MG Tab Take  by mouth every day.      traZODone (DESYREL) 50 MG Tab Take 1 Tablet by mouth every evening. 30 Tablet 3    losartan (COZAAR) 25 MG Tab Take 1 Tablet by mouth every day.      vitamin D (VITAMIND D3) 1000 UNIT Tab Take  1 Tablet by mouth every day.      fluticasone-salmeterol (WIXELA INHUB) 100-50 MCG/DOSE AEROSOL POWDER, BREATH ACTIVATED Inhale 1 Puff 2 times a day. Rince mouth after use 1 Each 11    montelukast (SINGULAIR) 10 MG Tab TAKE 1 TABLET BY MOUTH EVERY DAY 90 Tablet 3    albuterol (PROAIR HFA) 108 (90 Base) MCG/ACT Aero Soln inhalation aerosol Inhale 2 Puffs by mouth every 6 hours as needed. 8.5 g 3     No current facility-administered medications on file prior to visit.         Past Medical History:   Diagnosis Date    ASTHMA     Chronic fatigue 9/28/2016    Degenerative arthritis of spine     Mild intermittent asthma with acute exacerbation 6/18/2015    Post-menopause on HRT (hormone replacement therapy)     Pulmonary nodule 6/28/2016    Pulmonary, stable CT 8/15    Rheumatic fever     Weight gain 6/28/2016      Social History     Tobacco Use    Smoking status: Never    Smokeless tobacco: Never   Vaping Use    Vaping Use: Never used   Substance Use Topics    Alcohol use: Yes     Alcohol/week: 0.6 - 1.2 oz     Types: 1 - 2 Glasses of wine per week     Comment: occ wine    Drug use: No        Objective       I personally reviewed imaging below and agree with the findings    MRI brain ([facility]), 8/29/2022  Acute left occipital lobe infarct with petechial hemorrhage.    CTA head 8/30/2022  1.  Moderate atherosclerotic plaquing and ectasia of the V4 segment of the left vertebral artery.     2.  Otherwise unremarkable CT angiogram of the head.  CTA neck 8/30/2022  CT angiogram of the neck within normal limits.  TTE 8/30/2022    LVEF 60%, normal diastolic function, mildly dilated LA    Stroke Labs:  Creat 0.60, A1C 5.6, .  Discharged with ZIO patch on 8/31/2022,  Afib noted on 9/6/202,, Eliquis started and ASA stopped 9/7/2022.     /62 (BP Location: Right arm, Patient Position: Sitting, BP Cuff Size: Adult)   Pulse 95   Temp 36.7 °C (98 °F) (Temporal)   Ht 1.524 m (5')   Wt 65.4 kg (144 lb 2.9 oz)    SpO2 95%   BMI 28.16 kg/m²        PHYSICAL ASSESSMENT  Constitutional:  Alert, no apparent distress,  Psych:   mood and affect WNL  Muskuloskeletal:  Moves all extremities equally, strength 5/5  BUE/BLE flexors/extensors, no drift  NEUROLOGICAL ASSESSMENT  Oriented X 4, speech fluent, naming and memory intact  CN II: Visual fields are full to confrontation. Fundoscopic exam is normal with sharp discs and no vascular changes. Pupils are 4 mm and briskly reactive to light.   CN III: IV, VI  EOMs intact, no ptosis  CN V: Facial sensation is intact to pinprick in all 3 divisions bilaterally. Corneal responses are intact.  CN VII: Face is symmetric with normal eye closure and smile.  CN VIII Hearing is normal to rubbing fingers  CN IX, X: Palate elevates symmetrically. Phonation is normal.  CN XI: Head turning and shoulder shrug are intact  CN XII: Tongue is midline with normal movements and no atrophy.                           Sensation to PP equal bilaterally                 No limb ataxia with finger to nose and heel to shin                 Ambulates with steady gait.                 Rhomberg negative                Biceps,brachioradialis, tricep, and patellar reflexes all 2+     Cardiovascular:    S1S2, no abnormal rhythm auscultated, no peripheral edema  Neck:                     No carotid bruits noted   Pulmonary:            Respirations easy, lungs clear to auscultation all fields.     Skin:                     No obvious rashes.     Iniital NIHSS unknown      Current NIHSS    1a. LOC: 0  1b. LOC Questions: 0  1c. LOC Commands: 0  2. Best Gaze:0  3. Visual Fields: 0  4. Facial Paresis: 0  5a. Motor arm left: 0  5b. Motor arm right: 0  6a. Motor leg left: 0  6b. Motor leg right: 0  7. Sensory: 0  8. Best Language: 0  9. Limb Ataxia: 0  10. Dysarthria: 0  11. Extinction/Inattention: 0    Total Score Current 0          Current mRS 1        Assessment & Plan       1. Late effect of stroke    Presumed Mechanism by  TOAST:  Left occipital lobe infarct likely secondary to newly diagnosed atrial fibrillation.  No visual field deficits to finger counting.      __  Large Artery Atherosclerosis  __  Small Vessel (lacunar)  __XX   Cardioembolic  __   Other (Sickle cell, vasculitis, hypercoagulable)  __   Unknown      Stroke risk factors:  Afib, HLD  Blood pressure goal less than 130/80, current 138/62., at home blood pressure in 110s/60s-70s.        LDL goal < 70, current 112, decrease Atorvastatin to 40mg, ,I filled for 90 days, she will need to have Dr. Casas refill after that,  discussed medication side effects, will need follow up with primary care evaluate liver function at intervals and refill  Exercise at least 30 minutes daily, avoid/minimize red meat, fried foods, butter, cheese.   Eat 5-6 servings of vegetables and fruits daily, choose lean white meat without skin (chicken, turkey, white fish)--baked, broiled or grilled.    She should follow up with ophthalmology or optometry for [place]rales's Test.    - Referral back to [facility]CP    2. Paroxysmal atrial fibrillation (HCC)  Continue  Eliquis 5mg BID, avoid NSAIDS  May use acetaminophen for pain.        - Referral back to [facility]CP        If any new signs of stroke:  sudden weakness, numbness, speech difficulty (slurring or difficulty finding words), imbalance, incoordination, worse headache of life or vision loss occur, call 911.      Take all medications as prescribed unless instructed by your provider.        II have counseled patient on stroke prevention strategies, stroke symptoms and mimics.  Diet and exercise modifications.  We discussed medication side effects and instructions.       I have provided patient a written personalized stroke prevention plan, it is filed under the media tab under '[facility]”.      Referral back to primary care.

## 2022-11-21 ENCOUNTER — TELEPHONE (OUTPATIENT)
Dept: HEALTH INFORMATION MANAGEMENT | Facility: OTHER | Age: 72
End: 2022-11-21
Payer: MEDICARE

## 2022-12-28 ENCOUNTER — APPOINTMENT (OUTPATIENT)
Dept: RADIOLOGY | Facility: MEDICAL CENTER | Age: 72
End: 2022-12-28
Attending: INTERNAL MEDICINE
Payer: MEDICARE

## 2023-01-11 ENCOUNTER — APPOINTMENT (OUTPATIENT)
Dept: RADIOLOGY | Facility: MEDICAL CENTER | Age: 73
End: 2023-01-11
Attending: INTERNAL MEDICINE
Payer: MEDICARE

## 2023-01-12 ENCOUNTER — PATIENT MESSAGE (OUTPATIENT)
Dept: SLEEP MEDICINE | Facility: MEDICAL CENTER | Age: 73
End: 2023-01-12
Payer: MEDICARE

## 2023-01-16 ENCOUNTER — APPOINTMENT (OUTPATIENT)
Dept: SLEEP MEDICINE | Facility: MEDICAL CENTER | Age: 73
End: 2023-01-16
Attending: FAMILY MEDICINE
Payer: MEDICARE

## 2023-01-23 ENCOUNTER — APPOINTMENT (OUTPATIENT)
Dept: RADIOLOGY | Facility: MEDICAL CENTER | Age: 73
End: 2023-01-23
Attending: INTERNAL MEDICINE
Payer: MEDICARE

## 2023-02-06 ENCOUNTER — HOSPITAL ENCOUNTER (OUTPATIENT)
Dept: PULMONOLOGY | Facility: MEDICAL CENTER | Age: 73
End: 2023-02-06
Attending: FAMILY MEDICINE
Payer: MEDICARE

## 2023-02-06 DIAGNOSIS — J45.40 MODERATE PERSISTENT ASTHMA WITHOUT COMPLICATION: ICD-10-CM

## 2023-02-06 PROCEDURE — 94060 EVALUATION OF WHEEZING: CPT | Mod: 26 | Performed by: INTERNAL MEDICINE

## 2023-02-06 PROCEDURE — 94726 PLETHYSMOGRAPHY LUNG VOLUMES: CPT | Mod: 26 | Performed by: INTERNAL MEDICINE

## 2023-02-06 PROCEDURE — 94729 DIFFUSING CAPACITY: CPT

## 2023-02-06 PROCEDURE — 94726 PLETHYSMOGRAPHY LUNG VOLUMES: CPT

## 2023-02-06 PROCEDURE — 94060 EVALUATION OF WHEEZING: CPT

## 2023-02-06 PROCEDURE — 94729 DIFFUSING CAPACITY: CPT | Mod: 26 | Performed by: INTERNAL MEDICINE

## 2023-02-06 RX ADMIN — Medication 2.5 MG: at 12:39

## 2023-02-06 ASSESSMENT — PULMONARY FUNCTION TESTS
FEV1/FVC_PERCENT_CHANGE: 2
FEV1/FVC: 87
FEV1_PERCENT_CHANGE: 1
FVC_LLN: 2.03
FEV1/FVC_PERCENT_CHANGE: -100
FEV1/FVC_PERCENT_PREDICTED: 110
FEV1: 1.94
FVC_PERCENT_PREDICTED: 91
FEV1_PERCENT_PREDICTED: 101
FEV1/FVC: 89
FEV1/FVC_PERCENT_LLN: 66
FEV1_LLN: 1.59
FVC_PREDICTED: 2.43
FEV1_LLN: 1.59
FEV1/FVC_PREDICTED: 79
FEV1/FVC_PERCENT_PREDICTED: 114
FEV1/FVC: 87
FEV1_PREDICTED: 1.9
FEV1_PERCENT_CHANGE: -1
FVC_LLN: 2.03
FEV1/FVC_PERCENT_PREDICTED: 111
FVC: 2.2
FVC_PERCENT_PREDICTED: 90
FVC: 2.23
FEV1: 1.96
FEV1_PERCENT_PREDICTED: 103
FEV1/FVC_PERCENT_LLN: 66
FEV1/FVC_PERCENT_PREDICTED: 78
FEV1/FVC_PERCENT_PREDICTED: 113
FEV1/FVC: 89.09

## 2023-02-07 ENCOUNTER — HOSPITAL ENCOUNTER (OUTPATIENT)
Dept: RADIOLOGY | Facility: MEDICAL CENTER | Age: 73
End: 2023-02-07
Attending: INTERNAL MEDICINE
Payer: MEDICARE

## 2023-02-07 DIAGNOSIS — I47.10 PAROXYSMAL SUPRAVENTRICULAR TACHYCARDIA (HCC): ICD-10-CM

## 2023-02-07 DIAGNOSIS — I48.91 ATRIAL FIBRILLATION, NEW ONSET (HCC): ICD-10-CM

## 2023-02-07 DIAGNOSIS — I48.0 PAROXYSMAL ATRIAL FIBRILLATION (HCC): ICD-10-CM

## 2023-02-07 PROCEDURE — 78452 HT MUSCLE IMAGE SPECT MULT: CPT | Mod: 26 | Performed by: INTERNAL MEDICINE

## 2023-02-07 PROCEDURE — 93018 CV STRESS TEST I&R ONLY: CPT | Performed by: INTERNAL MEDICINE

## 2023-02-07 PROCEDURE — 78452 HT MUSCLE IMAGE SPECT MULT: CPT

## 2023-02-07 RX ORDER — REGADENOSON 0.08 MG/ML
INJECTION, SOLUTION INTRAVENOUS
Status: DISPENSED
Start: 2023-02-07 | End: 2023-02-07

## 2023-02-08 ENCOUNTER — PATIENT MESSAGE (OUTPATIENT)
Dept: CARDIOLOGY | Facility: MEDICAL CENTER | Age: 73
End: 2023-02-08
Payer: MEDICARE

## 2023-02-09 NOTE — PROCEDURES
DATE OF SERVICE:  02/06/2023     PULMONARY FUNCTION TEST INTERPRETATION     REFERRING PHYSICIAN:  Keke Casas MD     INTERPRETING PHYSICIAN:  Hannah Alexander MD     REASON FOR STUDY:  Moderate persistent asthma without complication.     STUDY ADEQUACY:  Good effort; patient met ATS standards by flow volume loop   and expiratory time.     RESULTS:  SPIROMETRY:  FVC is 2.23, which is 91% predicted without significant change   postbronchodilator.  FEV1 is 1.94 liters, which is 101% predicted without significant change   postbronchodilator.  FEV1/FVC is 89.     LUNG VOLUMES:  TLC is 4.46 liters, which is 100% predicted.  RV is 2.28 liters, which is 113% predicted.     DIFFUSION CAPACITY:  DLCO is 19.34, which is 113% predicted.  DL/VA is 5.35, which is 139% predicted.     INTERPRETATION:  1.  Spirometry is normal.  2.  There is no significant change postbronchodilator.  3.  The flow volume loop is consistent with the spirometry data.  4.  Lung volumes are normal.  5.  Diffusion capacity is normal.  6.  In comparison to the prior study from 08/27/2020, the FEV1 has improved from 1.88 to 1.94.        ______________________________  Hannah Alexander MD    MAIRA/ALLYSSA    DD:  02/08/2023 17:05  DT:  02/08/2023 17:12    Job#:  [id number]

## 2023-02-15 ENCOUNTER — APPOINTMENT (OUTPATIENT)
Dept: CARDIOLOGY | Facility: MEDICAL CENTER | Age: 73
End: 2023-02-15
Payer: MEDICARE

## 2023-02-15 ENCOUNTER — TELEMEDICINE (OUTPATIENT)
Dept: CARDIOLOGY | Facility: MEDICAL CENTER | Age: 73
End: 2023-02-15
Payer: MEDICARE

## 2023-02-15 VITALS — WEIGHT: 135 LBS | BODY MASS INDEX: 26.5 KG/M2 | HEIGHT: 60 IN | HEART RATE: 84 BPM

## 2023-02-15 DIAGNOSIS — Z79.01 CHRONIC ANTICOAGULATION: ICD-10-CM

## 2023-02-15 DIAGNOSIS — Z86.73 HISTORY OF CVA (CEREBROVASCULAR ACCIDENT): ICD-10-CM

## 2023-02-15 DIAGNOSIS — E78.5 DYSLIPIDEMIA: ICD-10-CM

## 2023-02-15 DIAGNOSIS — I48.0 PAROXYSMAL ATRIAL FIBRILLATION (HCC): ICD-10-CM

## 2023-02-15 DIAGNOSIS — I47.10 PAROXYSMAL SUPRAVENTRICULAR TACHYCARDIA (HCC): ICD-10-CM

## 2023-02-15 DIAGNOSIS — I48.91 HYPERCOAGULABLE STATE DUE TO ATRIAL FIBRILLATION (HCC): ICD-10-CM

## 2023-02-15 DIAGNOSIS — D68.69 HYPERCOAGULABLE STATE DUE TO ATRIAL FIBRILLATION (HCC): ICD-10-CM

## 2023-02-15 DIAGNOSIS — I10 ESSENTIAL HYPERTENSION: ICD-10-CM

## 2023-02-15 DIAGNOSIS — I47.29 NONSUSTAINED VENTRICULAR TACHYCARDIA (HCC): ICD-10-CM

## 2023-02-15 PROCEDURE — 99214 OFFICE O/P EST MOD 30 MIN: CPT | Mod: 95 | Performed by: INTERNAL MEDICINE

## 2023-02-15 RX ORDER — FLECAINIDE ACETATE 150 MG/1
TABLET ORAL
Qty: 60 TABLET | Refills: 3 | Status: SHIPPED | OUTPATIENT
Start: 2023-02-15 | End: 2023-03-09 | Stop reason: SDUPTHER

## 2023-02-15 ASSESSMENT — FIBROSIS 4 INDEX: FIB4 SCORE: 0.82

## 2023-02-15 NOTE — PROGRESS NOTES
Cardiology Telemedicine Visit Follow-Up Consultation Note    Date of note:    2/15/2023  Primary Care Provider: Keke Casas M.D.  Referring Provider: Nicol Bass A.P.R.*     Patient Name: Ezio Mora   YOB: 1950  MRN:              [id number]      This evaluation was conducted via Zoom, using secure and encrypted videoconferencing technology.    The patient was at home in the [facility].  The patient's identity was confirmed and verbal consent for the telemedicine encounter was obtained.       Chief Complaint   Patient presents with    Palpitations    Paroxysmal Supraventricular Tachycardia (PSVT)    Atrial Fibrillation     F/v Dx:Paroxysmal atrial fibrillation (HCC)             History of Present Illness: Ms. Ezio Mora is a 72 y.o. female whose current medical problems include CVA in 8/2022, newly diagnosed Afib, now on Eliquis, HTN and SVT who is here for follow up.    Pertinent History:  CVA: Woke up with flashing lights.  Urgently saw her ophthalmologist who recommended ER evaluation.  CT and MRI head were done at [facility].  CT was unremarkable but MRI demonstrated left occipital lobe infarct with petechial hemorrhage.  Was urgently transferred to [facility].  CT head/neck showed moderate atherosclerotic plaque of left vertebral artery.  Echocardiogram was unremarkable except for mildly dilated left atrium.  Was discharged home with Zio patch monitor.  Has residual loss of peripheral vision in left eye otherwise no impairment from CVA.  History of SVT and nonsustained VT and has been on diltiazem 120 mg for several years.  In regards to nonsustained VT, was noted on treadmill stress test.  Underwent coronary angiogram which was negative for obstructive CAD.  Paroxysmal atrial fibrillation: Diagnosed via Zio patch monitor.  Is symptomatic with palpitations  [id number] score 5, prior CVA  On chronic anticoagulation with Eliquis    Continues to have  breakthrough episodes of palpitations which are short-lived.  Has noticed an increase in fatigue and is wondering if this is from high-intensity statin medication.    In terms of physical activity, is active and able to perform her ADLs without any limiting factors.      Last clinic visit: 10/27/2022    Interim events:  Since her last visit, she has been having several breakthrough episodes of atrial fibrillation.  Episodes are usually short-lived with self conversion but are quite bothersome.    Is compliant with anticoagulation.  Has been taking both diltiazem and metoprolol.    Has been noticing positional lightheadedness and dizziness.    Past Medical History:   Diagnosis Date    ASTHMA     Chronic fatigue 9/28/2016    Degenerative arthritis of spine     Mild intermittent asthma with acute exacerbation 6/18/2015    Post-menopause on HRT (hormone replacement therapy)     Pulmonary nodule 6/28/2016    Pulmonary, stable CT 8/15    Rheumatic fever     Weight gain 6/28/2016         Past Surgical History:   Procedure Laterality Date    FUSION, SPINE, LUMBAR, PLIF      PRIMARY C SECTION      TUBAL LIGATION           Current Outpatient Medications   Medication Sig Dispense Refill    metoprolol tartrate (LOPRESSOR) 25 MG Tab Take 1 Tablet by mouth 2 times a day. 200 Tablet 3    flecainide (TAMBOCOR) 150 MG Tab Take 1 tablet with onset of Afib. Take second tablet if still in Afib after 1 hour. Do not exceed more than 2 tablets in 24 hours. 60 Tablet 3    WIXELA INHUB 100-50 MCG/ACT AEROSOL POWDER, BREATH ACTIVATED INHALE 1 PUFF TWICE DAILY, RINSE MOUTH AFTER USE 1 Each 5    atorvastatin (LIPITOR) 40 MG Tab Take 1 Tablet by mouth at bedtime. 90 Tablet 0    apixaban (ELIQUIS) 5mg Tab Take 1 Tablet by mouth 2 times a day. 180 Tablet 3    Magnesium Citrate 100 MG Tab Take  by mouth every day.      Calcium Carbonate 1500 (600 Ca) MG Tab Take  by mouth every day.      traZODone (DESYREL) 50 MG Tab Take 1 Tablet by mouth every  evening. 30 Tablet 3    losartan (COZAAR) 25 MG Tab Take 1 Tablet by mouth every day.      vitamin D (VITAMIND D3) 1000 UNIT Tab Take 1 Tablet by mouth every day.      montelukast (SINGULAIR) 10 MG Tab TAKE 1 TABLET BY MOUTH EVERY DAY 90 Tablet 3    albuterol (PROAIR HFA) 108 (90 Base) MCG/ACT Aero Soln inhalation aerosol Inhale 2 Puffs by mouth every 6 hours as needed. 8.5 g 3     No current facility-administered medications for this visit.         Allergies   Allergen Reactions    Lisinopril Unspecified    Terramycin [Oxytetracycline]      Rash as a child          Family History   Problem Relation Age of Onset    Other Mother         global decline due to age    Heart Disease Father     Thyroid Child          Social History     Socioeconomic History    Marital status:      Spouse name: Not on file    Number of children: Not on file    Years of education: Not on file    Highest education level: Not on file   Occupational History    Not on file   Tobacco Use    Smoking status: Never    Smokeless tobacco: Never   Vaping Use    Vaping Use: Never used   Substance and Sexual Activity    Alcohol use: Yes     Alcohol/week: 0.6 - 1.2 oz     Types: 1 - 2 Glasses of wine per week     Comment: occ wine    Drug use: No    Sexual activity: Not on file   Other Topics Concern    Not on file   Social History Narrative         Social Determinants of Health     Financial Resource Strain: Not on file   Food Insecurity: Not on file   Transportation Needs: Not on file   Physical Activity: Not on file   Stress: Not on file   Social Connections: Not on file   Intimate Partner Violence: Not on file   Housing Stability: Not on file         Physical Exam:  Vitals as provided by the patient   Pulse 84   Ht 1.524 m (5')   Wt 61.2 kg (135 lb)    Oxygen Therapy:  Pulse Oximetry:  (unable to obtain no equipment)  BP Readings from Last 4 Encounters:   11/14/22 138/62   10/27/22 110/72   09/06/22 120/79   08/31/22 116/67          Weight/BMI: Body mass index is 26.37 kg/m².  Wt Readings from Last 4 Encounters:   02/15/23 61.2 kg (135 lb)   11/14/22 65.4 kg (144 lb 2.9 oz)   10/27/22 62.1 kg (137 lb)   08/29/22 62.5 kg (137 lb 12.6 oz)       Physical Exam:  General: No acute distress. Well nourished.   HEENT: EOM grossly intact, no scleral icterus, no pharyngeal erythema.   Neck:  No JVD noted at 90 degrees, trachea midline  CVS: Pulse as reported by patient, no visible LE edema.  Resp: Unlabored respiratory effort, no cough or audible wheeze  MSK/Ext: No clubbing or cyanosis visible appreciated.  Skin: No rashes in visible areas.  Neurological: CN III-XII grossly intact. No focal deficits.   Psych: A&O x 3, appropriate affect, good judgement, well groomed      Lab Data Review:  Lab Results   Component Value Date/Time    CHOLSTRLTOT 226 (H) 08/30/2022 03:09 AM     (H) 08/30/2022 03:09 AM     08/30/2022 03:09 AM    TRIGLYCERIDE 56 08/30/2022 03:09 AM       Lab Results   Component Value Date/Time    SODIUM 139 08/30/2022 03:09 AM    POTASSIUM 4.0 08/30/2022 03:09 AM    CHLORIDE 105 08/30/2022 03:09 AM    CO2 23 08/30/2022 03:09 AM    GLUCOSE 100 (H) 08/30/2022 03:09 AM    BUN 11 08/30/2022 03:09 AM    CREATININE 0.60 08/30/2022 03:09 AM    GLOMRATE 66 02/22/2022 10:34 AM     Lab Results   Component Value Date/Time    ALKPHOSPHAT 67 08/30/2022 03:09 AM    ASTSGOT 14 08/30/2022 03:09 AM    ALTSGPT 14 08/30/2022 03:09 AM    TBILIRUBIN 0.5 08/30/2022 03:09 AM      Lab Results   Component Value Date/Time    WBC 6.7 08/30/2022 03:09 AM     Lab Results   Component Value Date/Time    HBA1C 5.6 08/30/2022 03:09 AM         Cardiac Imaging and Procedures Review:    EKG dated 8/29/2022: My personal interpretation is sinus rhythm    Echo dated 8/30/2022:   My personal interpretation is normal left ventricular size and function.  LVEF 60%, no valvular abnormality.  Mildly dilated LA    Outside Echo 6/25/2021:  The Ejection Fraction  estimate is 60-65%   No regional wall motion abnormalities noted.   Normal diastolic function   The right ventricular systolic function is mildly reduced.   There is mild to moderate aortic regurgitation.   Doppler findings do not suggest pulmonary hypertension.   There is no pericardial effusion.     Holter monitor (9/16/2022):   New diagnosis of A. fib with RVR  Episodes of nonsustained VT    Outside Holter Monitor (2/16/2022):   18 days for palpitations   sinus  (74)   1st degree heart block, no significant pauses   1% PVC, 5% PACs   No AF detected      Outside LHC at [facility] (2/25/2022):   · Nonobstructive CAD   · Successful LHC and coronary angiography via right radial approach.       Radiology test Review:  CTA neck 8/30/2022: Unremarkable    CTA head (8/30/2022): IMPRESSION:  1.  Moderate atherosclerotic plaquing and ectasia of the V4 segment of the left vertebral artery.   2.  Otherwise unremarkable CT angiogram of the head.      Assessment & Plan     1. Paroxysmal atrial fibrillation (HCC)  metoprolol tartrate (LOPRESSOR) 25 MG Tab    flecainide (TAMBOCOR) 150 MG Tab      2. Hypercoagulable state due to atrial fibrillation (HCC)        3. PSVT (paroxysmal supraventricular tachycardia) ([facility])        4. Nonsustained ventricular tachycardia        5. Chronic anticoagulation        6. Essential hypertension        7. History of CVA (cerebrovascular accident)        8. Dyslipidemia            She has been having breakthrough episodes of atrial fibrillation and has been taking both metoprolol and diltiazem with increase in dizziness.  Discontinue diltiazem and continue metoprolol 25 mg twice daily.     We discussed different treatment options for atrial fibrillation with using AAD as pill in the pocket, on a regular basis versus ablation procedure.  She wishes to keep ablation procedure as a last resort.  After discussion, he is interested in using flecainide as pill in the pocket.  Prescription sent to  her pharmacy.  Discussed potential side effects from the medication.      Tolerating anticoagulation without any bleeding concerns.  Continue Eliquis 5 mg twice daily.    Reports well-controlled blood pressure.  Continue losartan 25 mg daily.    Doing better on lipitor 40 mg.    All of patient's excellent questions were answered to the best of my knowledge and to her satisfaction.  It was a pleasure seeing Ms. Ezio Mora in my clinic today. Return in about 3 months (around 5/15/2023). Patient is aware to call the cardiology clinic with any questions or concerns.      Ramon Gomez MD  [facility]  [facility], [address]  [address] [address]  Phone: [phone]  Fax: [phone]

## 2023-02-16 ENCOUNTER — TELEMEDICINE (OUTPATIENT)
Dept: SLEEP MEDICINE | Facility: MEDICAL CENTER | Age: 73
End: 2023-02-16
Payer: MEDICARE

## 2023-02-16 VITALS
HEART RATE: 71 BPM | BODY MASS INDEX: 26.5 KG/M2 | SYSTOLIC BLOOD PRESSURE: 142 MMHG | HEIGHT: 60 IN | DIASTOLIC BLOOD PRESSURE: 76 MMHG | WEIGHT: 135 LBS | RESPIRATION RATE: 16 BRPM

## 2023-02-16 DIAGNOSIS — J45.40 MODERATE PERSISTENT ASTHMA WITHOUT COMPLICATION: ICD-10-CM

## 2023-02-16 DIAGNOSIS — R91.1 PULMONARY NODULE: ICD-10-CM

## 2023-02-16 PROCEDURE — 99215 OFFICE O/P EST HI 40 MIN: CPT | Mod: 95 | Performed by: INTERNAL MEDICINE

## 2023-02-16 RX ORDER — FLUTICASONE PROPIONATE AND SALMETEROL XINAFOATE 115; 21 UG/1; UG/1
2 AEROSOL, METERED RESPIRATORY (INHALATION) 2 TIMES DAILY
Qty: 12 G | Refills: 11 | Status: SHIPPED | OUTPATIENT
Start: 2023-02-16 | End: 2023-03-08

## 2023-02-16 ASSESSMENT — ENCOUNTER SYMPTOMS
HEADACHES: 0
SHORTNESS OF BREATH: 1
CHILLS: 0
PALPITATIONS: 1
WHEEZING: 1
DIZZINESS: 0
FEVER: 0
COUGH: 1

## 2023-02-16 ASSESSMENT — FIBROSIS 4 INDEX: FIB4 SCORE: 0.82

## 2023-02-16 NOTE — PROGRESS NOTES
Pulmonary Clinic Note    Telemedicine Video Visit: Established Patient   This visit was conducted via Zoom using secure and encrypted videoconferencing technology. The patient was in a private location in the state [facility]. The patient's identity was confirmed and verbal consent was obtained for this virtual visit. Given the importance of social distancing and other strategies recommended to reduce the risk of COVID-19 transmission, I am providing medical care to this patient via audio/video visit in place of an in person visit at the request of the patient. Verbal consent to telehealth, risks, benefits, and consequences were discussed. Patient retains the right to withdraw at any time. All existing confidentiality protections apply. The patient has access to all transmitted medical information. No dissemination of any patient images or information to other entities without further written consent.    Chief Complaint:  Chief Complaint   Patient presents with    Follow-Up     Last seen 1/31/22 RENU [place]    Results     PFT 2/6/23     HPI:   This patient is a 72 y.o. female whom is followed in our clinic for moderate asthma last seen by Barb Voss on 1/31/2022.   Patient reports that overall, she is doing well, but she has had some increase in her shortness of breath and wheezing since changing to Wixela.  She feels that she is not able to take a deep inhalation of it of the powder get stuck in her mouth.  She says that she had better control over her symptoms when she was on her previous inhalers, which she cannot remember at this time.  She completed her PFTs which were normal.  She does report that she had an overnight oximetry test, but I cannot find the report of this.  She reports that she was told that it was normal.  She continues to take Claritin, Flonase, Singulair for her allergies.  She was admitted in August 2022 with a left occipital stroke, her only residual deficit is right peripheral  vision impairment.  At that time, they diagnosed her with paroxysmal atrial fibrillation.  She is now on full dose anticoagulation as well as metoprolol.  She also uses flecainide as needed for palpitations and RVR.  She states that she has been using her albuterol sparingly because she does not want to send herself into rapid atrial fibrillation.      Past Medical History:   Diagnosis Date    ASTHMA     Chronic fatigue 9/28/2016    Degenerative arthritis of spine     Mild intermittent asthma with acute exacerbation 6/18/2015    Post-menopause on HRT (hormone replacement therapy)     Pulmonary nodule 6/28/2016    Pulmonary, stable CT 8/15    Rheumatic fever     Weight gain 6/28/2016       Social History     Socioeconomic History    Marital status:      Spouse name: Not on file    Number of children: Not on file    Years of education: Not on file    Highest education level: Not on file   Occupational History    Not on file   Tobacco Use    Smoking status: Never    Smokeless tobacco: Never   Vaping Use    Vaping Use: Never used   Substance and Sexual Activity    Alcohol use: Yes     Alcohol/week: 0.6 - 1.2 oz     Types: 1 - 2 Glasses of wine per week     Comment: occ wine    Drug use: No    Sexual activity: Not on file   Other Topics Concern    Not on file   Social History Narrative         Social Determinants of Health     Financial Resource Strain: Not on file   Food Insecurity: Not on file   Transportation Needs: Not on file   Physical Activity: Not on file   Stress: Not on file   Social Connections: Not on file   Intimate Partner Violence: Not on file   Housing Stability: Not on file            Current Outpatient Medications on File Prior to Visit   Medication Sig Dispense Refill    metoprolol tartrate (LOPRESSOR) 25 MG Tab Take 1 Tablet by mouth 2 times a day. 200 Tablet 3    flecainide (TAMBOCOR) 150 MG Tab Take 1 tablet with onset of Afib. Take second tablet if still in Afib after 1 hour. Do not  exceed more than 2 tablets in 24 hours. 60 Tablet 3    traZODone (DESYREL) 50 MG Tab TAKE 1 TABLET BY MOUTH EVERY EVENING 90 Tablet 3    WIXELA INHUB 100-50 MCG/ACT AEROSOL POWDER, BREATH ACTIVATED INHALE 1 PUFF TWICE DAILY, RINSE MOUTH AFTER USE 1 Each 5    atorvastatin (LIPITOR) 40 MG Tab Take 1 Tablet by mouth at bedtime. 90 Tablet 0    apixaban (ELIQUIS) 5mg Tab Take 1 Tablet by mouth 2 times a day. 180 Tablet 3    Magnesium Citrate 100 MG Tab Take  by mouth every day.      Calcium Carbonate 1500 (600 Ca) MG Tab Take  by mouth every day.      losartan (COZAAR) 25 MG Tab Take 1 Tablet by mouth every day.      vitamin D (VITAMIND D3) 1000 UNIT Tab Take 1 Tablet by mouth every day.      montelukast (SINGULAIR) 10 MG Tab TAKE 1 TABLET BY MOUTH EVERY DAY 90 Tablet 3    albuterol (PROAIR HFA) 108 (90 Base) MCG/ACT Aero Soln inhalation aerosol Inhale 2 Puffs by mouth every 6 hours as needed. 8.5 g 3     No current facility-administered medications on file prior to visit.     Review of Systems   Constitutional:  Negative for chills and fever.   Respiratory:  Positive for cough, shortness of breath and wheezing.    Cardiovascular:  Positive for palpitations. Negative for chest pain.   Neurological:  Negative for dizziness and headaches.     Vitals:  BP (!) 142/76 (BP Location: Right arm, Patient Position: Sitting, BP Cuff Size: Adult)   Pulse 71   Resp 16   Ht 1.524 m (5')   Wt 61.2 kg (135 lb)     Physical Exam  Unable to complete, virtual visit     Immunizations:  Flu:11/8/21  Pneumovax 23:2020  Prevnar 13:recommend  COVID-19: 9/1/21, 2/9/21, 1/12/21    Pertinent Studies:    PFTs as reviewed by me personally show:  8/2018 - normal   2/2023 - normal     Imaging as reviewed by me personally show:    HRCT 9/3/2015   IMPRESSION:  1.  Right lower lobe pulmonary nodule measuring 5 mm and right middle lobe pulmonary nodule measuring 2 mm. Recommend followup low dose chest CT in 12 months. Lung rads category 2.  2.  Mild  diffuse borderline bronchiectasis.    CT Chest 10/6/2016  IMPRESSION:  Multiple stable pulmonary nodules, the largest width measuring 5 mm in the right lower lobe.  One tiny 2.4 mm right middle lobe nodule is new.  Atherosclerotic plaque.    Echo:  8/30/2022  CONCLUSIONS  Normal left ventricular chamber size.  The left ventricular ejection fraction is visually estimated to be 60%.  Normal diastolic function.  Normal inferior vena cava size and inspiratory collapse.  Estimated right ventricular systolic pressure is 24  mmHg.    Assessment/Plan:  Problem List Items Addressed This Visit       Moderate persistent asthma without complication     PFTs: Normal  Eos: 520 in 8/20/2022  IgE: None  Allergies: Seasonal  Maintenance inhalers: Wixela    Patient feels that she continues to have some chest tightness and wheezing shortness of breath.  She feels that this is worse since starting Wixela, she feels that she cannot take a proper inhalation of the medication and has had a noticeable decline in her respiratory status since starting the inhaler    We will change patient to Advair HFA with a spacer  Continue with Zyrtec and Singulair for allergies  If patient continues to be symptomatic, may consider a biologic in light of the fact that she has significant eosinophilia  Continue with albuterol as needed, she states that she is using it sparingly due to rapid atrial fibrillation, but agrees to use it if she needs it.  Plan to order an IgE level with her next set of labs           Relevant Medications    fluticasone-salmeterol (ADVAIR HFA) 115-21 MCG/ACT inhaler    Pulmonary nodule     Stable from 2016, patient is at low risk, no follow-up needed            Return in about 6 weeks (around 3/30/2023).      Time spent in record review prior to patient arrival, reviewing results, and in face-to-face encounter totaled 42 min, excluding any procedures if performed.    Lilo Terry, DO   Pulmonary and Critical Care

## 2023-02-17 NOTE — ASSESSMENT & PLAN NOTE
PFTs: Normal  Eos: 520 in 8/20/2022  IgE: None  Allergies: Seasonal  Maintenance inhalers: Wixela    Patient feels that she continues to have some chest tightness and wheezing shortness of breath.  She feels that this is worse since starting Wixela, she feels that she cannot take a proper inhalation of the medication and has had a noticeable decline in her respiratory status since starting the inhaler    We will change patient to Advair HFA with a spacer  Continue with Zyrtec and Singulair for allergies  If patient continues to be symptomatic, may consider a biologic in light of the fact that she has significant eosinophilia  Continue with albuterol as needed, she states that she is using it sparingly due to rapid atrial fibrillation, but agrees to use it if she needs it.  Plan to order an IgE level with her next set of labs

## 2023-02-22 ENCOUNTER — TELEPHONE (OUTPATIENT)
Dept: CARDIOLOGY | Facility: MEDICAL CENTER | Age: 73
End: 2023-02-22
Payer: MEDICARE

## 2023-02-22 NOTE — TELEPHONE ENCOUNTER
Detail Level: Detailed SHERRI    Caller: - Mayr    Reported Symptoms: Reporting on and off Afib for past several days    Recent Blood Pressure/Heart Rate Reading: N/A    Callback Number: [phone]    Thank you,   Luna URBAN    Quality 110: Preventive Care And Screening: Influenza Immunization: Influenza Immunization Administered during Influenza season

## 2023-02-22 NOTE — TELEPHONE ENCOUNTER
Called pt  [phone]  Pt reports several breakthrough episodes of afib (verified via Access [employer] 3). Pt c/o palpitations, only during episodes and subsides after taking pill in pocket Flecainide. Pt denies SOB, CP, swelling. /65 HR 83. Cardiac medications verified with name/dose/frequency. Pt still NOT willing to discuss ablation. Pt asking DA for further medication recommendations. Okay to respond via uberVUhart.        To DA  Any recommendations?

## 2023-02-24 ENCOUNTER — PATIENT MESSAGE (OUTPATIENT)
Dept: CARDIOLOGY | Facility: MEDICAL CENTER | Age: 73
End: 2023-02-24
Payer: MEDICARE

## 2023-02-24 DIAGNOSIS — I47.10 PAROXYSMAL SUPRAVENTRICULAR TACHYCARDIA (HCC): ICD-10-CM

## 2023-02-24 DIAGNOSIS — I48.0 PAROXYSMAL ATRIAL FIBRILLATION (HCC): ICD-10-CM

## 2023-02-24 NOTE — TELEPHONE ENCOUNTER
Would recommend starting flecainide 50 mg twice daily on a regular basis to prevent episodes of atrial fibrillation.  Please send a prescription if patient is in agreement with the plan.  Thanks.

## 2023-02-28 ENCOUNTER — APPOINTMENT (RX ONLY)
Dept: URBAN - NONMETROPOLITAN AREA CLINIC 15 | Facility: CLINIC | Age: 73
Setting detail: DERMATOLOGY
End: 2023-02-28

## 2023-02-28 DIAGNOSIS — L81.4 OTHER MELANIN HYPERPIGMENTATION: ICD-10-CM

## 2023-02-28 DIAGNOSIS — L82.1 OTHER SEBORRHEIC KERATOSIS: ICD-10-CM

## 2023-02-28 PROCEDURE — ? LIQUID NITROGEN

## 2023-02-28 PROCEDURE — 99202 OFFICE O/P NEW SF 15 MIN: CPT

## 2023-02-28 PROCEDURE — ? COUNSELING

## 2023-02-28 ASSESSMENT — LOCATION SIMPLE DESCRIPTION DERM
LOCATION SIMPLE: RIGHT CHEEK
LOCATION SIMPLE: RIGHT CALF
LOCATION SIMPLE: RIGHT ANKLE
LOCATION SIMPLE: LEFT CHEEK

## 2023-02-28 ASSESSMENT — LOCATION DETAILED DESCRIPTION DERM
LOCATION DETAILED: RIGHT PROXIMAL LATERAL CALF
LOCATION DETAILED: RIGHT DISTAL CALF
LOCATION DETAILED: RIGHT DISTAL LATERAL CALF
LOCATION DETAILED: RIGHT POSTERIOR ANKLE
LOCATION DETAILED: RIGHT MEDIAL MALAR CHEEK
LOCATION DETAILED: RIGHT PROXIMAL CALF
LOCATION DETAILED: LEFT INFERIOR CENTRAL MALAR CHEEK

## 2023-02-28 ASSESSMENT — LOCATION ZONE DERM
LOCATION ZONE: LEG
LOCATION ZONE: FACE

## 2023-02-28 NOTE — PROCEDURE: LIQUID NITROGEN
Show Spray Paint Technique Variable?: Yes
Medical Necessity Information: It is in your best interest to select a reason for this procedure from the list below. All of these items fulfill various CMS LCD requirements except the new and changing color options.
Detail Level: Detailed
Render Note In Bullet Format When Appropriate: No
Duration Of Freeze Thaw-Cycle (Seconds): 0
Spray Paint Text: The liquid nitrogen was applied to the skin utilizing a spray paint frosting technique.
Consent: The patient's consent was obtained including but not limited to risks of crusting, scabbing, blistering, scarring, darker or lighter pigmentary change, recurrence, incomplete removal and infection.
Medical Necessity Clause: This procedure was medically necessary because the lesions that were treated were:  If lesion does not resolve, bx is needed.
Post-Care Instructions: I reviewed with the patient in detail post-care instructions. Patient is to wear sunprotection, and avoid picking at any of the treated lesions. Pt may apply Vaseline to crusted or scabbing areas.

## 2023-03-08 RX ORDER — FLUTICASONE PROPIONATE AND SALMETEROL 250; 50 UG/1; UG/1
1 POWDER RESPIRATORY (INHALATION) EVERY 12 HOURS
Qty: 1 EACH | Refills: 11 | Status: SHIPPED | OUTPATIENT
Start: 2023-03-08 | End: 2024-03-06

## 2023-03-09 RX ORDER — FLECAINIDE ACETATE 50 MG/1
50 TABLET ORAL 2 TIMES DAILY
Qty: 180 TABLET | Refills: 1 | Status: SHIPPED | OUTPATIENT
Start: 2023-03-09 | End: 2023-08-14 | Stop reason: SDUPTHER

## 2023-03-11 NOTE — TELEPHONE ENCOUNTER
Last ov 9/6/22    Received request via: Pharmacy    Was the patient seen in the last year in this department? Yes    Does the patient have an active prescription (recently filled or refills available) for medication(s) requested? No    Does the patient have [facility] and need 100 day supply (blood pressure, diabetes and cholesterol meds only)? Patient does not have SCP

## 2023-03-13 RX ORDER — MONTELUKAST SODIUM 10 MG/1
TABLET ORAL
Qty: 90 TABLET | Refills: 3 | Status: SHIPPED | OUTPATIENT
Start: 2023-03-13 | End: 2024-03-04 | Stop reason: SDUPTHER

## 2023-05-15 ENCOUNTER — OFFICE VISIT (OUTPATIENT)
Dept: CARDIOLOGY | Facility: MEDICAL CENTER | Age: 73
End: 2023-05-15
Attending: INTERNAL MEDICINE
Payer: MEDICARE

## 2023-05-15 VITALS
HEART RATE: 59 BPM | OXYGEN SATURATION: 92 % | RESPIRATION RATE: 16 BRPM | HEIGHT: 60 IN | WEIGHT: 131 LBS | BODY MASS INDEX: 25.72 KG/M2 | SYSTOLIC BLOOD PRESSURE: 124 MMHG | DIASTOLIC BLOOD PRESSURE: 72 MMHG

## 2023-05-15 DIAGNOSIS — Z79.899 HIGH RISK MEDICATION USE: ICD-10-CM

## 2023-05-15 DIAGNOSIS — D68.69 HYPERCOAGULABLE STATE DUE TO PAROXYSMAL ATRIAL FIBRILLATION (HCC): ICD-10-CM

## 2023-05-15 DIAGNOSIS — Z79.01 CHRONIC ANTICOAGULATION: ICD-10-CM

## 2023-05-15 DIAGNOSIS — I48.0 HYPERCOAGULABLE STATE DUE TO PAROXYSMAL ATRIAL FIBRILLATION (HCC): ICD-10-CM

## 2023-05-15 DIAGNOSIS — I48.0 PAROXYSMAL ATRIAL FIBRILLATION (HCC): ICD-10-CM

## 2023-05-15 DIAGNOSIS — I47.20 VENTRICULAR TACHYCARDIA (HCC): ICD-10-CM

## 2023-05-15 DIAGNOSIS — E78.5 DYSLIPIDEMIA: ICD-10-CM

## 2023-05-15 DIAGNOSIS — I44.7 NEW ONSET LEFT BUNDLE BRANCH BLOCK (LBBB): ICD-10-CM

## 2023-05-15 LAB — EKG IMPRESSION: NORMAL

## 2023-05-15 PROCEDURE — 99215 OFFICE O/P EST HI 40 MIN: CPT | Performed by: INTERNAL MEDICINE

## 2023-05-15 PROCEDURE — 93010 ELECTROCARDIOGRAM REPORT: CPT | Performed by: INTERNAL MEDICINE

## 2023-05-15 PROCEDURE — 99214 OFFICE O/P EST MOD 30 MIN: CPT | Performed by: INTERNAL MEDICINE

## 2023-05-15 PROCEDURE — 3078F DIAST BP <80 MM HG: CPT | Performed by: INTERNAL MEDICINE

## 2023-05-15 PROCEDURE — 3074F SYST BP LT 130 MM HG: CPT | Performed by: INTERNAL MEDICINE

## 2023-05-15 PROCEDURE — 93005 ELECTROCARDIOGRAM TRACING: CPT | Performed by: INTERNAL MEDICINE

## 2023-05-15 ASSESSMENT — FIBROSIS 4 INDEX: FIB4 SCORE: 0.82

## 2023-05-15 NOTE — PROGRESS NOTES
Cardiology Telemedicine Visit Follow-Up Consultation Note    Date of note:    5/15/2023  Primary Care Provider: Keke Casas M.D.  Referring Provider: Nicol Bass A.P.R.*     Patient Name: Ezio Mora   YOB: 1950  MRN:              [id number]      This evaluation was conducted via Zoom, using secure and encrypted videoconferencing technology.    The patient was at home in the [facility].  The patient's identity was confirmed and verbal consent for the telemedicine encounter was obtained.       Chief Complaint   Patient presents with    Ventricular Tachycardia       History of Present Illness: Ms. Ezio Mora is a 72 y.o. female whose current medical problems include CVA in 8/2022, newly diagnosed Afib, now on Eliquis, HTN and SVT who is here for follow up.    Pertinent History:  CVA: Woke up with flashing lights.  Urgently saw her ophthalmologist who recommended ER evaluation.  CT and MRI head were done at [facility].  CT was unremarkable but MRI demonstrated left occipital lobe infarct with petechial hemorrhage.  Was urgently transferred to [facility].  CT head/neck showed moderate atherosclerotic plaque of left vertebral artery.  Echocardiogram was unremarkable except for mildly dilated left atrium.  Was discharged home with Zio patch monitor.  Has residual loss of peripheral vision in left eye otherwise no impairment from CVA.  History of SVT and nonsustained VT and has been on diltiazem 120 mg for several years.  In regards to nonsustained VT, was noted on treadmill stress test.  Underwent coronary angiogram which was negative for obstructive CAD.  Paroxysmal atrial fibrillation: Diagnosed via Zio patch monitor.  Is symptomatic with palpitations  [id number] score 5, prior CVA  On chronic anticoagulation with Eliquis    In terms of physical activity, is active and able to perform her ADLs without any limiting factors.      Last clinic visit:  2/15/2023    Interim events:  Since her last visit, she was started on flecainide 50 mg twice daily for breakthrough episodes of atrial fibrillation.  Feels significantly improved after initiation of antiarrhythmic therapy with no concerns for palpitations or recurrent episodes of A-fib.  She is very happy about how well she feels.    Has been active around her home, lives in [place].5 acre property.  Has been able to do the yard work without worrying about palpitations or recurrent A-fib episodes.    Past Medical History:   Diagnosis Date    ASTHMA     Chronic fatigue 9/28/2016    Degenerative arthritis of spine     Mild intermittent asthma with acute exacerbation 6/18/2015    Post-menopause on HRT (hormone replacement therapy)     Pulmonary nodule 6/28/2016    Pulmonary, stable CT 8/15    Rheumatic fever     Weight gain 6/28/2016         Past Surgical History:   Procedure Laterality Date    FUSION, SPINE, LUMBAR, PLIF      PRIMARY C SECTION      TUBAL LIGATION           Current Outpatient Medications   Medication Sig Dispense Refill    montelukast (SINGULAIR) 10 MG Tab TAKE 1 TABLET BY MOUTH EVERY DAY 90 Tablet 3    flecainide (TAMBOCOR) 50 MG tablet Take 1 Tablet by mouth 2 times a day. 180 Tablet 1    fluticasone-salmeterol (WIXELA INHUB) 250-50 MCG/ACT AEROSOL POWDER, BREATH ACTIVATED Inhale 1 Puff every 12 hours. 1 Each 11    atorvastatin (LIPITOR) 40 MG Tab Take 1 Tablet by mouth at bedtime. 90 Tablet 3    metoprolol tartrate (LOPRESSOR) 25 MG Tab Take 1 Tablet by mouth 2 times a day. 200 Tablet 3    traZODone (DESYREL) 50 MG Tab TAKE 1 TABLET BY MOUTH EVERY EVENING 90 Tablet 3    apixaban (ELIQUIS) 5mg Tab Take 1 Tablet by mouth 2 times a day. 180 Tablet 3    Magnesium Citrate 100 MG Tab Take  by mouth every day.      Calcium Carbonate 1500 (600 Ca) MG Tab Take  by mouth every day.      losartan (COZAAR) 25 MG Tab Take 1 Tablet by mouth every day.      vitamin D (VITAMIND D3) 1000 UNIT Tab Take 1 Tablet by mouth  every day.      albuterol (PROAIR HFA) 108 (90 Base) MCG/ACT Aero Soln inhalation aerosol Inhale 2 Puffs by mouth every 6 hours as needed. 8.5 g 3     No current facility-administered medications for this visit.         Allergies   Allergen Reactions    Lisinopril Unspecified    Terramycin [Oxytetracycline]      Rash as a child          Family History   Problem Relation Age of Onset    Other Mother         global decline due to age    Heart Disease Father     Thyroid Child          Social History     Socioeconomic History    Marital status:      Spouse name: Not on file    Number of children: Not on file    Years of education: Not on file    Highest education level: Not on file   Occupational History    Not on file   Tobacco Use    Smoking status: Never    Smokeless tobacco: Never   Vaping Use    Vaping Use: Never used   Substance and Sexual Activity    Alcohol use: Yes     Alcohol/week: 0.6 - 1.2 oz     Types: 1 - 2 Glasses of wine per week     Comment: occ wine    Drug use: No    Sexual activity: Not on file   Other Topics Concern    Not on file   Social History Narrative         Social Determinants of Health     Financial Resource Strain: Not on file   Food Insecurity: Not on file   Transportation Needs: Not on file   Physical Activity: Not on file   Stress: Not on file   Social Connections: Not on file   Intimate Partner Violence: Not on file   Housing Stability: Not on file         Physical Exam:  Vitals as provided by the patient   /72 (BP Location: Left arm, Patient Position: Sitting, BP Cuff Size: Adult)   Pulse (!) 59   Resp 16   Ht 1.524 m (5')   Wt 59.4 kg (131 lb)   SpO2 92%    Oxygen Therapy:  Pulse Oximetry: 92 %  BP Readings from Last 4 Encounters:   05/15/23 124/72   02/16/23 (!) 142/76   11/14/22 138/62   10/27/22 110/72         Weight/BMI: Body mass index is 25.58 kg/m².  Wt Readings from Last 4 Encounters:   05/15/23 59.4 kg (131 lb)   04/11/23 59.4 kg (131 lb)   02/16/23  61.2 kg (135 lb)   02/15/23 61.2 kg (135 lb)       Physical Exam:  General: No acute distress. Well nourished.   HEENT: EOM grossly intact, no scleral icterus, no pharyngeal erythema.   Neck:  No JVD noted at 90 degrees, trachea midline  CVS: RRR.  No murmur/rub/gallops  Resp: CTAB, no wheezes  MSK/Ext: No clubbing or cyanosis visible appreciated.  No BLE edema  Skin: No rashes in visible areas.  Neurological: CN III-XII grossly intact. No focal deficits.   Psych: A&O x 3, appropriate affect, good judgement, well groomed      Lab Data Review:  Lab Results   Component Value Date/Time    CHOLSTRLTOT 226 (H) 08/30/2022 03:09 AM     (H) 08/30/2022 03:09 AM     08/30/2022 03:09 AM    TRIGLYCERIDE 56 08/30/2022 03:09 AM       Lab Results   Component Value Date/Time    SODIUM 139 08/30/2022 03:09 AM    POTASSIUM 4.0 08/30/2022 03:09 AM    CHLORIDE 105 08/30/2022 03:09 AM    CO2 23 08/30/2022 03:09 AM    GLUCOSE 100 (H) 08/30/2022 03:09 AM    BUN 11 08/30/2022 03:09 AM    CREATININE 0.60 08/30/2022 03:09 AM    GLOMRATE 66 02/22/2022 10:34 AM     Lab Results   Component Value Date/Time    ALKPHOSPHAT 67 08/30/2022 03:09 AM    ASTSGOT 14 08/30/2022 03:09 AM    ALTSGPT 14 08/30/2022 03:09 AM    TBILIRUBIN 0.5 08/30/2022 03:09 AM      Lab Results   Component Value Date/Time    WBC 6.7 08/30/2022 03:09 AM     Lab Results   Component Value Date/Time    HBA1C 5.6 08/30/2022 03:09 AM         Cardiac Imaging and Procedures Review:    EKG dated 5/15/2023: My personal interpretation is sinus rhythm with LBBB    EKG dated 8/29/2022: My personal interpretation is sinus rhythm    Echo dated 8/30/2022:   My personal interpretation is normal left ventricular size and function.  LVEF 60%, no valvular abnormality.  Mildly dilated LA    Outside Echo 6/25/2021:  The Ejection Fraction estimate is 60-65%   No regional wall motion abnormalities noted.   Normal diastolic function   The right ventricular systolic function is mildly  reduced.   There is mild to moderate aortic regurgitation.   Doppler findings do not suggest pulmonary hypertension.   There is no pericardial effusion.     NM cardiac stress test 2/7/2023:  No evidence of ischemia or infarction    Holter monitor (9/16/2022):   New diagnosis of A. fib with RVR  Episodes of nonsustained VT    Outside Holter Monitor (2/16/2022):   18 days for palpitations   sinus  (74)   1st degree heart block, no significant pauses   1% PVC, 5% PACs   No AF detected      Outside LHC at [facility] (2/25/2022):   · Nonobstructive CAD   · Successful LHC and coronary angiography via right radial approach.       Radiology test Review:  CTA neck 8/30/2022: Unremarkable    CTA head (8/30/2022): IMPRESSION:  1.  Moderate atherosclerotic plaquing and ectasia of the V4 segment of the left vertebral artery.   2.  Otherwise unremarkable CT angiogram of the head.      Assessment & Plan     1. Paroxysmal atrial fibrillation (HCC)        2. Ventricular tachycardia (HCC)  EKG      3. Hypercoagulable state due to paroxysmal atrial fibrillation (HCC)        4. Chronic anticoagulation        5. Dyslipidemia        6. New onset left bundle branch block (LBBB)  EKG      7. High risk medication use  EKG          Patient with today's EKG revealing new onset LBBB.  She is on high risk medication with flecainide 50 mg twice daily which puts her at a risk for life-threatening arrhythmias.  Repeat EKG in 1 month to monitor QRS complex.  Continue metoprolol 25 mg twice daily.    Tolerating anticoagulation without any bleeding concerns.  Continue Eliquis 5 twice daily.      Well-controlled blood pressure.  Continue losartan 25 mg daily.    For dyslipidemia, tolerating Lipitor 40 mg daily.    All of patient's excellent questions were answered to the best of my knowledge and to her satisfaction.  It was a pleasure seeing Ms. Ezio Mora in my clinic today. Return in about 3 months (around 8/15/2023). Patient is aware to  call the cardiology clinic with any questions or concerns.      Ramon Gomez MD  [facility]  [facility], [address]  [address] [address]  Phone: [phone]  Fax: [phone]

## 2023-06-15 DIAGNOSIS — I47.10 PAROXYSMAL SUPRAVENTRICULAR TACHYCARDIA (HCC): ICD-10-CM

## 2023-06-15 DIAGNOSIS — I48.0 PAROXYSMAL ATRIAL FIBRILLATION (HCC): ICD-10-CM

## 2023-06-22 ENCOUNTER — TELEPHONE (OUTPATIENT)
Dept: HEALTH INFORMATION MANAGEMENT | Facility: OTHER | Age: 73
End: 2023-06-22

## 2023-06-22 NOTE — TELEPHONE ENCOUNTER
OUTCOME: [facility]    PLEASE TRANSFER TO [facility] [phone] WHEN PT RETURNS CALL.    ATTEMPT # 1.

## 2023-06-26 ENCOUNTER — PATIENT MESSAGE (OUTPATIENT)
Dept: CARDIOLOGY | Facility: MEDICAL CENTER | Age: 73
End: 2023-06-26
Payer: MEDICARE

## 2023-06-28 NOTE — PATIENT COMMUNICATION
To Radha: can you please print the EKG order and sent to pt when you have a chance? Can you let her know how you are sending it to her so she can look out for it, thanks! She said either her address on file or her email: [email or web address]

## 2023-06-30 RX ORDER — FLECAINIDE ACETATE 50 MG/1
TABLET ORAL
Qty: 180 TABLET | Refills: 1 | OUTPATIENT
Start: 2023-06-30

## 2023-06-30 NOTE — TELEPHONE ENCOUNTER
Cosigner:  Ramon Gomez M.D. Signed:  3/9/2023 13:56     Pharmacy    [id number] - [place], [place] - [address] AT [facility] [address]   [address] [place] [address]   Phone:  [phone]  Fax:  [phone]   RAHEEL #:  [id number]   VAIBHAV Reason: --     Outpatient Medication Detail     Disp Refills Start End    flecainide (TAMBOCOR) 50 MG tablet 180 Tablet 1/1 3/9/2023     Sig - Route: Take 1 Tablet by mouth 2 times a day. - Oral    Sent to pharmacy as: Flecainide Acetate 50 MG Oral Tablet (TAMBOCOR)    Cosign for Ordering: Accepted by Ramon Gomez M.D. on 3/9/2023  1:56 PM    E-Prescribing Status: Receipt confirmed by pharmacy (3/9/2023 12:22 PM PST)

## 2023-07-03 ENCOUNTER — PATIENT MESSAGE (OUTPATIENT)
Dept: CARDIOLOGY | Facility: MEDICAL CENTER | Age: 73
End: 2023-07-03
Payer: MEDICARE

## 2023-07-06 ENCOUNTER — PATIENT MESSAGE (OUTPATIENT)
Dept: CARDIOLOGY | Facility: MEDICAL CENTER | Age: 73
End: 2023-07-06
Payer: MEDICARE

## 2023-07-13 DIAGNOSIS — Z79.899 HIGH RISK MEDICATION USE: ICD-10-CM

## 2023-07-13 DIAGNOSIS — I44.7 NEW ONSET LEFT BUNDLE BRANCH BLOCK (LBBB): ICD-10-CM

## 2023-07-24 NOTE — TELEPHONE ENCOUNTER
I sent her a message on 7/13 regarding the EKG and looks like she viewed the message on 7/16.  Thanks.

## 2023-08-08 ENCOUNTER — TELEPHONE (OUTPATIENT)
Dept: CARDIOLOGY | Facility: MEDICAL CENTER | Age: 73
End: 2023-08-08
Payer: MEDICARE

## 2023-08-08 DIAGNOSIS — I10 ESSENTIAL HYPERTENSION: ICD-10-CM

## 2023-08-08 RX ORDER — LOSARTAN POTASSIUM 25 MG/1
25 TABLET ORAL DAILY
Qty: 90 TABLET | Refills: 0 | Status: SHIPPED | OUTPATIENT
Start: 2023-08-08 | End: 2023-10-20

## 2023-08-08 NOTE — TELEPHONE ENCOUNTER
DA      Caller: Ezio    Medication Name and Dosage:     losartan (COZAAR) 25 MG Tab    Medication amount left: 0    Preferred Pharmacy:     [id number] -   [address] [place] [address] [place] [address]   Phone:  [phone]  Fax:  [phone]   RAHEEL #:  [id number]    Other questions (Topic): Pt called to have DA refill this medication as she spoke with her that her old provider in [place] used to refill this script. DA stated she would approve and refill once her current script was out.    Callback Number (Will only call for issues): [phone]

## 2023-08-08 NOTE — TELEPHONE ENCOUNTER
90 day refill sent to pharmacy per pt request  FU with DA 8/14/23      losartan (COZAAR) 25 MG Tab 90 Tablet 0/0 8/8/2023     Sig - Route: Take 1 Tablet by mouth every day. - Oral    Sent to pharmacy as: Losartan Potassium 25 MG Oral Tablet (Cozaar)    Cosign for Ordering: Required by Ramon Gomze M.D.    E-Prescribing Status: Receipt confirmed by pharmacy (8/8/2023 11:11 AM PDT)      Pharmacy    [facility] #[id number] - [place], [place] - [address] AT [facility] [address]

## 2023-08-14 ENCOUNTER — OFFICE VISIT (OUTPATIENT)
Dept: CARDIOLOGY | Facility: MEDICAL CENTER | Age: 73
End: 2023-08-14
Attending: INTERNAL MEDICINE
Payer: MEDICARE

## 2023-08-14 VITALS
HEIGHT: 60 IN | DIASTOLIC BLOOD PRESSURE: 70 MMHG | SYSTOLIC BLOOD PRESSURE: 112 MMHG | WEIGHT: 141 LBS | OXYGEN SATURATION: 93 % | RESPIRATION RATE: 16 BRPM | HEART RATE: 71 BPM | BODY MASS INDEX: 27.68 KG/M2

## 2023-08-14 DIAGNOSIS — I48.0 HYPERCOAGULABLE STATE DUE TO PAROXYSMAL ATRIAL FIBRILLATION (HCC): ICD-10-CM

## 2023-08-14 DIAGNOSIS — I47.10 PAROXYSMAL SUPRAVENTRICULAR TACHYCARDIA (HCC): ICD-10-CM

## 2023-08-14 DIAGNOSIS — Z86.73 HISTORY OF CVA (CEREBROVASCULAR ACCIDENT): ICD-10-CM

## 2023-08-14 DIAGNOSIS — I44.7 LBBB (LEFT BUNDLE BRANCH BLOCK): ICD-10-CM

## 2023-08-14 DIAGNOSIS — D68.69 HYPERCOAGULABLE STATE DUE TO PAROXYSMAL ATRIAL FIBRILLATION (HCC): ICD-10-CM

## 2023-08-14 DIAGNOSIS — I10 ESSENTIAL HYPERTENSION: ICD-10-CM

## 2023-08-14 DIAGNOSIS — E78.5 DYSLIPIDEMIA: ICD-10-CM

## 2023-08-14 DIAGNOSIS — I48.0 PAROXYSMAL ATRIAL FIBRILLATION (HCC): ICD-10-CM

## 2023-08-14 DIAGNOSIS — Z79.01 CHRONIC ANTICOAGULATION: ICD-10-CM

## 2023-08-14 PROCEDURE — 3078F DIAST BP <80 MM HG: CPT | Performed by: INTERNAL MEDICINE

## 2023-08-14 PROCEDURE — 99213 OFFICE O/P EST LOW 20 MIN: CPT | Performed by: INTERNAL MEDICINE

## 2023-08-14 PROCEDURE — 99214 OFFICE O/P EST MOD 30 MIN: CPT | Performed by: INTERNAL MEDICINE

## 2023-08-14 PROCEDURE — 3074F SYST BP LT 130 MM HG: CPT | Performed by: INTERNAL MEDICINE

## 2023-08-14 RX ORDER — FLECAINIDE ACETATE 50 MG/1
50 TABLET ORAL 2 TIMES DAILY
Qty: 180 TABLET | Refills: 3 | Status: SHIPPED | OUTPATIENT
Start: 2023-08-14 | End: 2024-03-15 | Stop reason: SDUPTHER

## 2023-08-14 ASSESSMENT — FIBROSIS 4 INDEX: FIB4 SCORE: 0.82

## 2023-08-14 NOTE — PROGRESS NOTES
Cardiology Follow-Up Consultation Note    Date of note:    8/14/2023  Primary Care Provider: Keke Csaas M.D.    Patient Name: Ezio Mora   YOB: 1950  MRN:              [id number]        History of Present Illness: Ms. Ezio Mora is a 72 y.o. female whose current medical problems include CVA in 8/2022, newly diagnosed Afib, now on Eliquis, HTN and SVT who is here for follow up.    Pertinent History:  CVA: Woke up with flashing lights.  Urgently saw her ophthalmologist who recommended ER evaluation.  CT and MRI head were done at [facility].  CT was unremarkable but MRI demonstrated left occipital lobe infarct with petechial hemorrhage.  Was urgently transferred to [facility].  CT head/neck showed moderate atherosclerotic plaque of left vertebral artery.  Echocardiogram was unremarkable except for mildly dilated left atrium.  Was discharged home with Zio patch monitor.  Has residual loss of peripheral vision in left eye otherwise no impairment from CVA.  History of SVT and nonsustained VT and has been on diltiazem 120 mg for several years.  In regards to nonsustained VT, was noted on treadmill stress test.  Underwent coronary angiogram which was negative for obstructive CAD.  Paroxysmal atrial fibrillation: Diagnosed via Zio patch monitor.  Is symptomatic with palpitations  [id number] score 5, prior CVA  On chronic anticoagulation with Eliquis    In terms of physical activity, is active and able to perform her ADLs without any limiting factors.      During last clinic visit 5/15/2023:  Since her last visit, she was started on flecainide 50 mg twice daily for breakthrough episodes of atrial fibrillation.  Feels significantly improved after initiation of antiarrhythmic therapy with no concerns for palpitations or recurrent episodes of A-fib.  She is very happy about how well she feels.    Has been active around her home, lives in [place] acre property.  Has been able to do  the yard work without worrying about palpitations or recurrent A-fib episodes.    Interim events:  Since her last visit, no breakthrough episodes of atrial fibrillation being on flecainide and metoprolol XL.  Continues to remain active without chest pain, pressure or palpitations.    Past Medical History:   Diagnosis Date    ASTHMA     Chronic fatigue 9/28/2016    Degenerative arthritis of spine     Mild intermittent asthma with acute exacerbation 6/18/2015    Post-menopause on HRT (hormone replacement therapy)     Pulmonary nodule 6/28/2016    Pulmonary, stable CT 8/15    Rheumatic fever     Weight gain 6/28/2016         Past Surgical History:   Procedure Laterality Date    FUSION, SPINE, LUMBAR, PLIF      PRIMARY C SECTION      TUBAL LIGATION           Current Outpatient Medications   Medication Sig Dispense Refill    flecainide (TAMBOCOR) 50 MG tablet Take 1 Tablet by mouth 2 times a day. 180 Tablet 3    losartan (COZAAR) 25 MG Tab Take 1 Tablet by mouth every day. 90 Tablet 0    montelukast (SINGULAIR) 10 MG Tab TAKE 1 TABLET BY MOUTH EVERY DAY 90 Tablet 3    fluticasone-salmeterol (WIXELA INHUB) 250-50 MCG/ACT AEROSOL POWDER, BREATH ACTIVATED Inhale 1 Puff every 12 hours. 1 Each 11    atorvastatin (LIPITOR) 40 MG Tab Take 1 Tablet by mouth at bedtime. 90 Tablet 3    metoprolol tartrate (LOPRESSOR) 25 MG Tab Take 1 Tablet by mouth 2 times a day. 200 Tablet 3    traZODone (DESYREL) 50 MG Tab TAKE 1 TABLET BY MOUTH EVERY EVENING 90 Tablet 3    apixaban (ELIQUIS) 5mg Tab Take 1 Tablet by mouth 2 times a day. 180 Tablet 3    Magnesium Citrate 100 MG Tab Take  by mouth every day.      Calcium Carbonate 1500 (600 Ca) MG Tab Take  by mouth every day.      vitamin D (VITAMIND D3) 1000 UNIT Tab Take 1 Tablet by mouth every day.      albuterol (PROAIR HFA) 108 (90 Base) MCG/ACT Aero Soln inhalation aerosol Inhale 2 Puffs by mouth every 6 hours as needed. 8.5 g 3     No current facility-administered medications for this  visit.         Allergies   Allergen Reactions    Lisinopril Unspecified    Terramycin [Oxytetracycline]      Rash as a child          Family History   Problem Relation Age of Onset    Other Mother         global decline due to age    Heart Disease Father     Thyroid Child          Social History     Socioeconomic History    Marital status:      Spouse name: Not on file    Number of children: Not on file    Years of education: Not on file    Highest education level: Not on file   Occupational History    Not on file   Tobacco Use    Smoking status: Never    Smokeless tobacco: Never   Vaping Use    Vaping Use: Never used   Substance and Sexual Activity    Alcohol use: Yes     Alcohol/week: 0.6 - 1.2 oz     Types: 1 - 2 Glasses of wine per week     Comment: occ wine    Drug use: No    Sexual activity: Not on file   Other Topics Concern    Not on file   Social History Narrative         Social Determinants of Health     Financial Resource Strain: Not on file   Food Insecurity: Not on file   Transportation Needs: Not on file   Physical Activity: Not on file   Stress: Not on file   Social Connections: Not on file   Intimate Partner Violence: Not on file   Housing Stability: Not on file         Physical Exam:  Vitals as provided by the patient   /70 (BP Location: Left arm, Patient Position: Sitting, BP Cuff Size: Adult)   Pulse 71   Resp 16   Ht 1.524 m (5')   Wt 64 kg (141 lb)   SpO2 93%    Oxygen Therapy:  Pulse Oximetry: 93 %  BP Readings from Last 4 Encounters:   08/14/23 112/70   05/15/23 124/72   02/16/23 (!) 142/76   11/14/22 138/62         Weight/BMI: Body mass index is 27.54 kg/m².  Wt Readings from Last 4 Encounters:   08/14/23 64 kg (141 lb)   05/15/23 59.4 kg (131 lb)   04/11/23 59.4 kg (131 lb)   02/16/23 61.2 kg (135 lb)       Physical Exam:  General: No acute distress. Well nourished.   HEENT: EOM grossly intact, no scleral icterus, no pharyngeal erythema.   Neck:  No JVD noted at 90  degrees, trachea midline  CVS: RRR.  No murmur/rub/gallops  Resp: CTAB, no wheezes  MSK/Ext: No clubbing or cyanosis visible appreciated.  No BLE edema  Skin: No rashes in visible areas.  Neurological: CN III-XII grossly intact. No focal deficits.   Psych: A&O x 3, appropriate affect, good judgement, well groomed      Lab Data Review:  Lab Results   Component Value Date/Time    CHOLSTRLTOT 226 (H) 08/30/2022 03:09 AM     (H) 08/30/2022 03:09 AM     08/30/2022 03:09 AM    TRIGLYCERIDE 56 08/30/2022 03:09 AM       Lab Results   Component Value Date/Time    SODIUM 139 08/30/2022 03:09 AM    POTASSIUM 4.0 08/30/2022 03:09 AM    CHLORIDE 105 08/30/2022 03:09 AM    CO2 23 08/30/2022 03:09 AM    GLUCOSE 100 (H) 08/30/2022 03:09 AM    BUN 11 08/30/2022 03:09 AM    CREATININE 0.60 08/30/2022 03:09 AM    GLOMRATE 66 02/22/2022 10:34 AM     Lab Results   Component Value Date/Time    ALKPHOSPHAT 67 08/30/2022 03:09 AM    ASTSGOT 14 08/30/2022 03:09 AM    ALTSGPT 14 08/30/2022 03:09 AM    TBILIRUBIN 0.5 08/30/2022 03:09 AM      Lab Results   Component Value Date/Time    WBC 6.7 08/30/2022 03:09 AM     Lab Results   Component Value Date/Time    HBA1C 5.6 08/30/2022 03:09 AM         Cardiac Imaging and Procedures Review:    Outside EKG dated 7/13/2023: My personal interpretation is sinus rhythm with LBBB    EKG dated 5/15/2023: My personal interpretation is sinus rhythm with LBBB    EKG dated 8/29/2022: My personal interpretation is sinus rhythm    Echo dated 8/30/2022:   My personal interpretation is normal left ventricular size and function.  LVEF 60%, no valvular abnormality.  Mildly dilated LA    Outside Echo 6/25/2021:  The Ejection Fraction estimate is 60-65%   No regional wall motion abnormalities noted.   Normal diastolic function   The right ventricular systolic function is mildly reduced.   There is mild to moderate aortic regurgitation.   Doppler findings do not suggest pulmonary hypertension.   There is  no pericardial effusion.     NM cardiac stress test 2/7/2023:  No evidence of ischemia or infarction    Holter monitor (9/16/2022):   New diagnosis of A. fib with RVR  Episodes of nonsustained VT    Outside Holter Monitor (2/16/2022):   18 days for palpitations   sinus  (74)   1st degree heart block, no significant pauses   1% PVC, 5% PACs   No AF detected      Outside LHC at [facility] (2/25/2022):   · Nonobstructive CAD   · Successful LHC and coronary angiography via right radial approach.       Radiology test Review:  CTA neck 8/30/2022: Unremarkable    CTA head (8/30/2022): IMPRESSION:  1.  Moderate atherosclerotic plaquing and ectasia of the V4 segment of the left vertebral artery.   2.  Otherwise unremarkable CT angiogram of the head.      Assessment & Plan     1. PSVT (paroxysmal supraventricular tachycardia) (HCC)        2. Paroxysmal atrial fibrillation (HCC)  flecainide (TAMBOCOR) 50 MG tablet      3. Hypercoagulable state due to paroxysmal atrial fibrillation (HCC)        4. Chronic anticoagulation        5. History of CVA (cerebrovascular accident)        6. Dyslipidemia        7. Essential hypertension        8. LBBB (left bundle branch block)        9. Paroxysmal supraventricular tachycardia (HCC)  flecainide (TAMBOCOR) 50 MG tablet          Patient is doing well from a cardiac perspective.  Outside EKG reviewed after initiating flecainide which shows stable QRS complex.  Continue flecainide 50 mg twice daily and metoprolol 25 mg twice daily.    Remains on anticoagulation with Eliquis without any bleeding concerns.  Labs reviewed which shows stable kidney function and hemoglobin.    Blood pressure well controlled.  Continue losartan 25 mg daily.    All of patient's excellent questions were answered to the best of my knowledge and to her satisfaction.  It was a pleasure seeing Ms. Ezio Mora in my clinic today. Return in about 6 months (around 2/14/2024). Patient is aware to call the  cardiology clinic with any questions or concerns.      Ramon Gomez MD  [facility]  [facility], [address]  [address] [address]  Phone: [phone]  Fax: [phone]

## 2023-08-25 ENCOUNTER — OFFICE VISIT (OUTPATIENT)
Dept: MEDICAL GROUP | Facility: MEDICAL CENTER | Age: 73
End: 2023-08-25
Payer: MEDICARE

## 2023-08-25 VITALS
WEIGHT: 143 LBS | OXYGEN SATURATION: 98 % | TEMPERATURE: 97 F | HEIGHT: 60 IN | HEART RATE: 73 BPM | DIASTOLIC BLOOD PRESSURE: 78 MMHG | BODY MASS INDEX: 28.07 KG/M2 | SYSTOLIC BLOOD PRESSURE: 160 MMHG

## 2023-08-25 DIAGNOSIS — D68.69 HYPERCOAGULABLE STATE DUE TO PAROXYSMAL ATRIAL FIBRILLATION (HCC): ICD-10-CM

## 2023-08-25 DIAGNOSIS — J45.40 MODERATE PERSISTENT ASTHMA WITHOUT COMPLICATION: ICD-10-CM

## 2023-08-25 DIAGNOSIS — I10 ESSENTIAL HYPERTENSION: ICD-10-CM

## 2023-08-25 DIAGNOSIS — I48.0 PAROXYSMAL ATRIAL FIBRILLATION (HCC): ICD-10-CM

## 2023-08-25 DIAGNOSIS — Z86.73 HISTORY OF CVA (CEREBROVASCULAR ACCIDENT): ICD-10-CM

## 2023-08-25 DIAGNOSIS — Z00.00 MEDICARE ANNUAL WELLNESS VISIT, SUBSEQUENT: ICD-10-CM

## 2023-08-25 DIAGNOSIS — E78.5 DYSLIPIDEMIA: ICD-10-CM

## 2023-08-25 DIAGNOSIS — F51.04 CHRONIC INSOMNIA: ICD-10-CM

## 2023-08-25 DIAGNOSIS — I69.30 LATE EFFECT OF STROKE: ICD-10-CM

## 2023-08-25 DIAGNOSIS — I48.0 HYPERCOAGULABLE STATE DUE TO PAROXYSMAL ATRIAL FIBRILLATION (HCC): ICD-10-CM

## 2023-08-25 PROCEDURE — 3077F SYST BP >= 140 MM HG: CPT | Performed by: FAMILY MEDICINE

## 2023-08-25 PROCEDURE — G0439 PPPS, SUBSEQ VISIT: HCPCS | Performed by: FAMILY MEDICINE

## 2023-08-25 PROCEDURE — 3078F DIAST BP <80 MM HG: CPT | Performed by: FAMILY MEDICINE

## 2023-08-25 RX ORDER — ATORVASTATIN CALCIUM 80 MG/1
80 TABLET, FILM COATED ORAL NIGHTLY
Qty: 80 TABLET | Refills: 2 | Status: SHIPPED | OUTPATIENT
Start: 2023-08-25 | End: 2024-03-01

## 2023-08-25 ASSESSMENT — ACTIVITIES OF DAILY LIVING (ADL): BATHING_REQUIRES_ASSISTANCE: 0

## 2023-08-25 ASSESSMENT — ENCOUNTER SYMPTOMS: GENERAL WELL-BEING: GOOD

## 2023-08-25 ASSESSMENT — FIBROSIS 4 INDEX: FIB4 SCORE: 0.82

## 2023-08-25 ASSESSMENT — PATIENT HEALTH QUESTIONNAIRE - PHQ9: CLINICAL INTERPRETATION OF PHQ2 SCORE: 0

## 2023-08-25 NOTE — PROGRESS NOTES
Chief Complaint   Patient presents with    Annual Exam     Preventative        HPI:  Ezio Mora is a 72 y.o. here for Medicare Annual Wellness Visit     Patient Active Problem List    Diagnosis Date Noted    LBBB (left bundle branch block) 08/14/2023    History of CVA (cerebrovascular accident) 02/15/2023    Dyslipidemia 02/15/2023    Late effect of stroke 11/14/2022    Atrial fibrillation, new onset (HCC) 10/27/2022    Hypercoagulable state due to atrial fibrillation (HCC) 10/27/2022    Paroxysmal atrial fibrillation (HCC) 10/27/2022    Chronic anticoagulation 10/27/2022    Asthma 08/30/2022    Acute CVA (cerebrovascular accident) (HCC) 08/29/2022    Abnormal stress test 02/01/2022    Essential hypertension 07/20/2021    Nonsustained ventricular tachycardia 07/20/2021    Right hip pain 11/25/2019    PSVT (paroxysmal supraventricular tachycardia) ([facility]) 04/12/2018    Chronic nonseasonal allergic rhinitis due to pollen 04/12/2018    Anxiety 08/28/2017    Palpitations 08/07/2017    Blocked eustachian tube 12/30/2016    Chronic fatigue 09/28/2016    Pulmonary nodule 06/28/2016    Moderate persistent asthma without complication 10/28/2014       Current Outpatient Medications   Medication Sig Dispense Refill    flecainide (TAMBOCOR) 50 MG tablet Take 1 Tablet by mouth 2 times a day. 180 Tablet 3    losartan (COZAAR) 25 MG Tab Take 1 Tablet by mouth every day. 90 Tablet 0    montelukast (SINGULAIR) 10 MG Tab TAKE 1 TABLET BY MOUTH EVERY DAY 90 Tablet 3    fluticasone-salmeterol (WIXELA INHUB) 250-50 MCG/ACT AEROSOL POWDER, BREATH ACTIVATED Inhale 1 Puff every 12 hours. 1 Each 11    atorvastatin (LIPITOR) 40 MG Tab Take 1 Tablet by mouth at bedtime. 90 Tablet 3    metoprolol tartrate (LOPRESSOR) 25 MG Tab Take 1 Tablet by mouth 2 times a day. 200 Tablet 3    traZODone (DESYREL) 50 MG Tab TAKE 1 TABLET BY MOUTH EVERY EVENING 90 Tablet 3    apixaban (ELIQUIS) 5mg Tab Take 1 Tablet by mouth 2 times a day. 180 Tablet  3    Magnesium Citrate 100 MG Tab Take  by mouth every day.      Calcium Carbonate 1500 (600 Ca) MG Tab Take  by mouth every day.      vitamin D (VITAMIND D3) 1000 UNIT Tab Take 1 Tablet by mouth every day.      albuterol (PROAIR HFA) 108 (90 Base) MCG/ACT Aero Soln inhalation aerosol Inhale 2 Puffs by mouth every 6 hours as needed. 8.5 g 3     No current facility-administered medications for this visit.          Current supplements as per medication list.     Allergies: Lisinopril and Terramycin [oxytetracycline]    Current social contact/activities:      She  reports that she has never smoked. She has never used smokeless tobacco. She reports current alcohol use of about 0.6 - 1.2 oz of alcohol per week. She reports that she does not use drugs.  Counseling given: Not Answered      ROS:    Gait: Uses no assistive device  Ostomy: No  Other tubes: No  Amputations: No  Chronic oxygen use: No  Last eye exam: 2022  Wears hearing aids: No   : Denies any urinary leakage during the last 6 months    Screening:  Depression Screening  Little interest or pleasure in doing things?  0 - not at all  Feeling down, depressed , or hopeless? 0 - not at all  Patient Health Questionnaire Score: 0     If depressive symptoms identified deferred to follow up visit unless specifically addressed in assessment and plan.    Interpretation of PHQ-9 Total Score   Score Severity   1-4 No Depression   5-9 Mild Depression   10-14 Moderate Depression   15-19 Moderately Severe Depression   20-27 Severe Depression    Screening for Cognitive Impairment  Three Minute Recall (daughter, heaven, mountain) 2/3    Vincent clock face with all 12 numbers and set the hands to show 10 past 11.  Yes    Cognitive concerns identified deferred for follow up unless specifically addressed in assessment and plan.    Fall Risk Assessment  Has the patient had two or more falls in the last year or any fall with injury in the last year?  No    Safety Assessment  Throw rugs  on floor.     Handrails on all stairs.     Good lighting in all hallways.     Difficulty hearing.  No  Patient counseled about all safety risks that were identified.    Functional Assessment ADLs  Are there any barriers preventing you from cooking for yourself or meeting nutritional needs?  No.    Are there any barriers preventing you from driving safely or obtaining transportation?  No.    Are there any barriers preventing you from using a telephone or calling for help?  No.    Are there any barriers preventing you from shopping?  No.    Are there any barriers preventing you from taking care of your own finances?  No.    Are there any barriers preventing you from managing your medications?  No.    Are there any barriers preventing you from showering, bathing or dressing yourself?  No.    Are you currently engaging in any exercise or physical activity?  Yes.     What is your perception of your health?  Good    Advance Care Planning  Do you have an Advance Directive, Living Will, Durable Power of , or POLST? No                 Health Maintenance Summary            Overdue - Hepatitis C Screening (Once) Overdue - never done      No completion history exists for this topic.              Overdue - Zoster (Shingles) Vaccines (1 of 2) Overdue - never done      No completion history exists for this topic.              Overdue - Colorectal Cancer Screening (Colonoscopy - Every 10 Years) Overdue since 10/28/2019      10/28/2009  Colonoscopy (Done - Gastrointerologist Kwan)              Overdue - Pneumococcal Vaccine: 65+ Years (2 - PCV) Overdue since 2/10/2021      02/10/2020  Imm Admin: Pneumococcal polysaccharide vaccine (PPSV-23)              Overdue - COVID-19 Vaccine (2 - Pfizer series) Overdue since 4/27/2023 12/27/2022  Imm Admin: MODERNA BIVALENT BOOSTER SARS-COV-2 VACCINE (6+)    05/23/2022  Imm Admin: MODERNA SARS-COV-2 VACCINE (12+)    09/01/2021  Imm Admin: MODERNA SARS-COV-2 VACCINE (12+)     02/09/2021  Imm Admin: MODERNA SARS-COV-2 VACCINE (12+)    01/12/2021  Imm Admin: MODERNA SARS-COV-2 VACCINE (12+)              Overdue - Annual Wellness Visit (Every 366 Days) Overdue since 8/24/2023 08/23/2022  Visit Dx: Medicare annual wellness visit, subsequent    08/23/2022  Subsequent Annual Wellness Visit - Includes PPPS ()    08/14/2019  Subsequent Annual Wellness Visit - Includes PPPS ()    08/14/2019  Visit Dx: Encounter for Medicare annual wellness exam              Influenza Vaccine (1) Next due on 9/1/2023      10/10/2022  Imm Admin: Influenza, Unspecified - HISTORICAL DATA    11/08/2021  Imm Admin: Influenza Vaccine Adult HD    10/07/2020  Imm Admin: Influenza Vaccine Quad Inj (Pf)    09/27/2019  Imm Admin: Influenza Vaccine Adult HD    10/11/2018  Imm Admin: Influenza Vaccine Adult HD    Only the first 5 history entries have been loaded, but more history exists.              Mammogram (Yearly) Next due on 8/7/2024 08/07/2023  MA-SCREENING MAMMO BILAT W/IMPLANTS W/SAMANTA W/O CAD    04/03/2018  MA-MAMMO SCREEN BILAT IMPLANTS SAMANTA CAD    09/14/2016  [id number]    10/28/2012  Done - banner              Bone Density Scan (Every 5 Years) Tentatively due on 3/5/2025      03/05/2020  DS-BONE DENSITY STUDY (DEXA)              IMM DTaP/Tdap/Td Vaccine (3 - Td or Tdap) Next due on 11/12/2028 11/12/2018  Imm Admin: Tdap Vaccine    11/02/2012  Imm Admin: Tdap Vaccine              Hepatitis A Vaccine (Hep A) (Series Information) Aged Out      No completion history exists for this topic.              Hepatitis B Vaccine (Hep B) (Series Information) Aged Out      No completion history exists for this topic.              HPV Vaccines (Series Information) Aged Out      No completion history exists for this topic.              Polio Vaccine (Inactivated Polio) (Series Information) Aged Out      No completion history exists for this topic.              IMM MENINGOCOCCAL ACWY VACCINE  (Series Information) Aged Out      No completion history exists for this topic.              Discontinued - Cervical Cancer Screening  Discontinued        Frequency changed to Never automatically (Topic No Longer Applies)    05/28/2014  Done - [facility]              Discontinued - Annual Pulmonary Function Test / Spirometry  Discontinued        Frequency changed to Never automatically (Topic No Longer Applies)    02/06/2023  PFT DICTATED RESULTS    08/27/2020  PULMONARY FUNCTION TESTS -Test requested: Complete Pulmonary Function Test                    Patient Care Team:  Keke Casas M.D. as PCP - General (Geriatrics)        Social History     Tobacco Use    Smoking status: Never    Smokeless tobacco: Never   Vaping Use    Vaping Use: Never used   Substance Use Topics    Alcohol use: Yes     Alcohol/week: 0.6 - 1.2 oz     Types: 1 - 2 Glasses of wine per week     Comment: occ wine    Drug use: No     Family History   Problem Relation Age of Onset    Other Mother         global decline due to age    Heart Disease Father     Thyroid Child      She  has a past medical history of ASTHMA, Chronic fatigue (9/28/2016), Degenerative arthritis of spine, Mild intermittent asthma with acute exacerbation (6/18/2015), Post-menopause on HRT (hormone replacement therapy), Pulmonary nodule (6/28/2016), Rheumatic fever, and Weight gain (6/28/2016).   Past Surgical History:   Procedure Laterality Date    FUSION, SPINE, LUMBAR, PLIF      PRIMARY C SECTION      TUBAL LIGATION         Exam:   There were no vitals taken for this visit. There is no height or weight on file to calculate BMI.    Hearing good.    Dentition good  Alert, oriented in no acute distress.  Eye contact is good, speech goal directed, affect calm    Assessment and Plan. The following treatment and monitoring plan is recommended:     72 y.o. female     1. Medicare annual wellness visit, subsequent  Preventive measures and chronic medical issues  reviewed.    - Subsequent Annual Wellness Visit - Includes PPPS ()    2. Essential hypertension  Slightly elevated.  However patient stated that she has been checking her blood pressure at home once in a while, and it has been normal.  For now continue on losartan 25 mg daily, and metoprolol 25 mg twice a day.  Patient advised to check her blood pressure 3 times a day for a week and send it to me for reevaluation.    - Subsequent Annual Wellness Visit - Includes PPPS ()    3. Paroxysmal atrial fibrillation (HCC)  No RVR.  Continue on metoprolol 25 mg twice a day, flecainide 50 mg twice a day, and Eliquis 5 mg twice a day.    - Subsequent Annual Wellness Visit - Includes PPPS ()    4. Hypercoagulable state due to paroxysmal atrial fibrillation (HCC)  Due to A-fib.  Continue Eliquis twice a day    - Subsequent Annual Wellness Visit - Includes PPPS ()    5. Late effect of stroke  6. History of CVA (cerebrovascular accident)  Patient presented with right hemianopsia from [facility] at [place] on 8/29/2022.  She had CT scan and MRI were done at [facility]. CT head was negative for acute abnormalities and the MRI demonstrated left occipital lobe infarct with petechial hemorrhage.  So patient was transferred to [facility].  Patient was evaluated by neurology who recommended to continue aspirin, statin and aim for normotensive pressures. While at [facility], CT head and neck demonstrated moderate atherosclerotic plaque of the left vertebral artery.  Currently she has permanent right homonymous hemianopia.  Continue on statin, continue proper control of A-fib with oral anticoagulant  Continue follow-up with neurology.    - Subsequent Annual Wellness Visit - Includes PPPS ()    7. Moderate persistent asthma without complication  Stable.  Continue Wixela inhaler twice a day, and albuterol as needed.    - Subsequent Annual Wellness Visit - Includes PPPS ()    8.  Dyslipidemia  Most recent lipid panel showed high LDL and total cholesterol.  Currently on atorvastatin 40 mg daily.  Will increase atorvastatin to 80 mg daily.  Repeat a lipid panel in 3 to 4 months.  Return to the clinic in 4 months to discuss it.    - Subsequent Annual Wellness Visit - [facility]cludes [facility] ()  - Lipid Profile; Future  - atorvastatin (LIPITOR) 80 MG tablet; Take 1 Tablet by mouth every evening.  Dispense: 80 Tablet; Refill: 2    9. Chronic insomnia  She has been doing fine on trazodone 50 mg daily.    - Subsequent Annual Wellness Visit - Includes [facility] ()    Services suggested:   Health Care Screening: Age-appropriate preventive services recommended by USPTF and ACIP covered by Medicare were discussed today. Services ordered if indicated and agreed upon by the patient.  Referrals offered: Community-based lifestyle interventions to reduce health risks and promote self-management and wellness, fall prevention, nutrition, physical activity, tobacco-use cessation, weight loss, and mental health services as per orders if indicated.    Discussion today about general wellness and lifestyle habits:    Prevent falls and reduce trip hazards; Cautioned about securing or removing rugs.  Have a working fire alarm and carbon monoxide detector;   Engage in regular physical activity and social activities     Follow-up: No follow-ups on file.

## 2023-10-20 DIAGNOSIS — I10 ESSENTIAL HYPERTENSION: ICD-10-CM

## 2023-10-20 DIAGNOSIS — I48.0 PAROXYSMAL ATRIAL FIBRILLATION (HCC): ICD-10-CM

## 2023-10-20 RX ORDER — LOSARTAN POTASSIUM 25 MG/1
25 TABLET ORAL DAILY
Qty: 90 TABLET | Refills: 0 | OUTPATIENT
Start: 2023-10-20

## 2023-10-20 RX ORDER — LOSARTAN POTASSIUM 25 MG/1
25 TABLET ORAL DAILY
Qty: 90 TABLET | Refills: 0 | Status: SHIPPED | OUTPATIENT
Start: 2023-10-20 | End: 2023-12-05

## 2023-10-20 RX ORDER — APIXABAN 5 MG/1
5 TABLET, FILM COATED ORAL 2 TIMES DAILY
Qty: 180 TABLET | Refills: 0 | Status: SHIPPED | OUTPATIENT
Start: 2023-10-20 | End: 2024-03-15 | Stop reason: SDUPTHER

## 2023-10-20 RX ORDER — APIXABAN 5 MG/1
5 TABLET, FILM COATED ORAL 2 TIMES DAILY
Qty: 180 TABLET | Refills: 1 | OUTPATIENT
Start: 2023-10-20

## 2023-10-20 NOTE — TELEPHONE ENCOUNTER
To VR: DA patient, okay to fill while patient reestablishes with new cardiologist? Thank you!    To schedulers: Please reach out to schedule office visit to establish with new provider, thank you!

## 2023-12-04 DIAGNOSIS — I10 ESSENTIAL HYPERTENSION: ICD-10-CM

## 2023-12-05 RX ORDER — LOSARTAN POTASSIUM 25 MG/1
25 TABLET ORAL DAILY
Qty: 90 TABLET | Refills: 0 | Status: SHIPPED | OUTPATIENT
Start: 2023-12-05 | End: 2023-12-14

## 2023-12-05 NOTE — TELEPHONE ENCOUNTER
To SW: Prior DA patient. Scheduled with you for 02/12/24. Requesting refill for losartan. Please advise if OK to fill. Thank you.

## 2023-12-14 ENCOUNTER — TELEMEDICINE (OUTPATIENT)
Dept: MEDICAL GROUP | Facility: MEDICAL CENTER | Age: 73
End: 2023-12-14
Payer: MEDICARE

## 2023-12-14 VITALS
BODY MASS INDEX: 27.15 KG/M2 | DIASTOLIC BLOOD PRESSURE: 92 MMHG | SYSTOLIC BLOOD PRESSURE: 145 MMHG | HEART RATE: 75 BPM | WEIGHT: 139 LBS

## 2023-12-14 DIAGNOSIS — I10 ESSENTIAL HYPERTENSION: ICD-10-CM

## 2023-12-14 DIAGNOSIS — D68.69 HYPERCOAGULABLE STATE DUE TO PAROXYSMAL ATRIAL FIBRILLATION (HCC): ICD-10-CM

## 2023-12-14 DIAGNOSIS — I48.0 PAROXYSMAL ATRIAL FIBRILLATION (HCC): ICD-10-CM

## 2023-12-14 DIAGNOSIS — I48.0 HYPERCOAGULABLE STATE DUE TO PAROXYSMAL ATRIAL FIBRILLATION (HCC): ICD-10-CM

## 2023-12-14 DIAGNOSIS — Z86.73 HISTORY OF STROKE: ICD-10-CM

## 2023-12-14 DIAGNOSIS — J45.40 MODERATE PERSISTENT ASTHMA WITHOUT COMPLICATION: ICD-10-CM

## 2023-12-14 PROCEDURE — 99214 OFFICE O/P EST MOD 30 MIN: CPT | Mod: 95 | Performed by: FAMILY MEDICINE

## 2023-12-14 RX ORDER — LOSARTAN POTASSIUM 50 MG/1
50 TABLET ORAL DAILY
Qty: 90 TABLET | Refills: 2 | Status: SHIPPED | OUTPATIENT
Start: 2023-12-14 | End: 2024-03-15

## 2023-12-14 ASSESSMENT — FIBROSIS 4 INDEX: FIB4 SCORE: 0.83

## 2023-12-14 NOTE — PROGRESS NOTES
Virtual Visit: Established Patient   This visit was conducted via Zoom using secure and encrypted videoconferencing technology.   The patient was in their home in the [facility].    The patient's identity was confirmed and verbal consent was obtained for this virtual visit.     Subjective:   CC:   Chief Complaint   Patient presents with    Follow-Up     Blood work        Ezio Mora is a 73 y.o. female presenting for evaluation and management of:    Essential hypertension  Patient's blood pressure is elevated, however denies any headache, chest pain, or shortness of breath.  Patient stated that she has been taking her blood pressure has been slightly high, stated that she has been having some family stressors lately.  Patient has been on losartan 25 mg daily.    Paroxysmal atrial fibrillation (HCC)/ Hypercoagulable state due to paroxysmal atrial fibrillation (HCC)  Currently denies any palpitation, chest pain, shortness of breath.  Patient has been on Eliquis 5 mg twice a day, flecainide 50 mg twice a day, and metoprolol 25 mg twice a day.  Patient has history of stroke    History of stroke  Patient with history of stroke in August 2022.  Currently with permanent right homonymous hemianopia.  Has been on atorvastatin 80 mg daily.  Has been on proper control of A-fib with oral anticoagulant.  She follows up with neurology    Moderate persistent asthma without complication  Stable.  Asymptomatic.  Currently denies any cough or shortness of breath.  She has been on Wixela twice a day, albuterol as needed, and singular 10 mg daily.    ROS       Current medicines (including changes today)  Current Outpatient Medications   Medication Sig Dispense Refill    losartan (COZAAR) 50 MG Tab Take 1 Tablet by mouth every day. 90 Tablet 2    ELIQUIS 5 MG Tab TAKE 1 TABLET BY MOUTH TWICE DAILY 180 Tablet 0    atorvastatin (LIPITOR) 80 MG tablet Take 1 Tablet by mouth every evening. 80 Tablet 2    flecainide (TAMBOCOR)  50 MG tablet Take 1 Tablet by mouth 2 times a day. 180 Tablet 3    montelukast (SINGULAIR) 10 MG Tab TAKE 1 TABLET BY MOUTH EVERY DAY 90 Tablet 3    fluticasone-salmeterol (WIXELA INHUB) 250-50 MCG/ACT AEROSOL POWDER, BREATH ACTIVATED Inhale 1 Puff every 12 hours. 1 Each 11    atorvastatin (LIPITOR) 40 MG Tab Take 1 Tablet by mouth at bedtime. 90 Tablet 3    metoprolol tartrate (LOPRESSOR) 25 MG Tab Take 1 Tablet by mouth 2 times a day. 200 Tablet 3    traZODone (DESYREL) 50 MG Tab TAKE 1 TABLET BY MOUTH EVERY EVENING 90 Tablet 3    Magnesium Citrate 100 MG Tab Take  by mouth every day.      Calcium Carbonate 1500 (600 Ca) MG Tab Take  by mouth every day.      vitamin D (VITAMIND D3) 1000 UNIT Tab Take 1 Tablet by mouth every day.      albuterol (PROAIR HFA) 108 (90 Base) MCG/ACT Aero Soln inhalation aerosol Inhale 2 Puffs by mouth every 6 hours as needed. 8.5 g 3     No current facility-administered medications for this visit.       Patient Active Problem List    Diagnosis Date Noted    LBBB (left bundle branch block) 08/14/2023    History of CVA (cerebrovascular accident) 02/15/2023    Dyslipidemia 02/15/2023    Late effect of stroke 11/14/2022    Atrial fibrillation, new onset (HCC) 10/27/2022    Hypercoagulable state due to atrial fibrillation (HCC) 10/27/2022    Paroxysmal atrial fibrillation (HCC) 10/27/2022    Chronic anticoagulation 10/27/2022    Asthma 08/30/2022    Acute CVA (cerebrovascular accident) (HCC) 08/29/2022    Abnormal stress test 02/01/2022    Essential hypertension 07/20/2021    Nonsustained ventricular tachycardia 07/20/2021    Right hip pain 11/25/2019    PSVT (paroxysmal supraventricular tachycardia) ([facility]) 04/12/2018    Chronic nonseasonal allergic rhinitis due to pollen 04/12/2018    Anxiety 08/28/2017    Palpitations 08/07/2017    Blocked eustachian tube 12/30/2016    Chronic fatigue 09/28/2016    Pulmonary nodule 06/28/2016    Moderate persistent asthma without complication 10/28/2014         Objective:   BP (!) 145/92 (BP Location: Left arm)   Wt 63 kg (139 lb) Comment: patient stated  BMI 27.15 kg/m²     Physical Exam:  Constitutional: Alert, no distress, well-groomed.  Skin: No rashes in visible areas.  Eye: Round. Conjunctiva clear, lids normal. No icterus.   ENMT: Lips pink without lesions, good dentition, moist mucous membranes. Phonation normal.  Neck: No masses, no thyromegaly. Moves freely without pain.  Respiratory: Unlabored respiratory effort, no cough or audible wheeze  Psych: Alert and oriented x3, normal affect and mood.     Assessment and Plan:   The following treatment plan was discussed:   73 y.o. female     1. Essential hypertension  Blood pressure is elevated.  Will increase losartan to 50 mg daily.    - losartan (COZAAR) 50 MG Tab; Take 1 Tablet by mouth every day.  Dispense: 90 Tablet; Refill: 2    2. Paroxysmal atrial fibrillation (HCC)  No RVR, asymptomatic.  Continue on Eliquis 5 mg twice a day, flecainide 50 mg twice a day, and metoprolol 25 mg twice a day    3. Hypercoagulable state due to paroxysmal atrial fibrillation (HCC)  Continue on Eliquis 5 mg twice a day    4. Moderate persistent asthma without complication  Stable.  Asymptomatic.  Continue on Wixela twice a day, and albuterol as needed  Continue on singular 10 mg daily    5. History of stroke  Patient with history of stroke in August 2022.  Currently with permanent right homonymous hemianopia.    Continue on atorvastatin 80 mg daily.    Continue on proper control of A-fib with oral anticoagulant.    Continue follow-up with neurology          Follow-up: No follow-ups on file.

## 2024-02-12 ENCOUNTER — APPOINTMENT (OUTPATIENT)
Dept: CARDIOLOGY | Facility: MEDICAL CENTER | Age: 74
End: 2024-02-12
Attending: INTERNAL MEDICINE
Payer: MEDICARE

## 2024-02-13 DIAGNOSIS — F51.04 CHRONIC INSOMNIA: ICD-10-CM

## 2024-02-13 RX ORDER — TRAZODONE HYDROCHLORIDE 50 MG/1
50 TABLET ORAL NIGHTLY
Qty: 90 TABLET | Refills: 3 | Status: SHIPPED | OUTPATIENT
Start: 2024-02-13

## 2024-03-01 DIAGNOSIS — E78.5 DYSLIPIDEMIA: ICD-10-CM

## 2024-03-01 RX ORDER — ATORVASTATIN CALCIUM 80 MG/1
80 TABLET, FILM COATED ORAL NIGHTLY
Qty: 80 TABLET | Refills: 2 | Status: SHIPPED | OUTPATIENT
Start: 2024-03-01

## 2024-03-03 DIAGNOSIS — R91.1 PULMONARY NODULE: ICD-10-CM

## 2024-03-03 DIAGNOSIS — J45.40 MODERATE PERSISTENT ASTHMA WITHOUT COMPLICATION: ICD-10-CM

## 2024-03-04 RX ORDER — MONTELUKAST SODIUM 10 MG/1
10 TABLET ORAL
Qty: 90 TABLET | Refills: 3 | Status: SHIPPED | OUTPATIENT
Start: 2024-03-04

## 2024-03-04 NOTE — TELEPHONE ENCOUNTER
[id number] - [place], [place] - [address] AT [facility] [address] [[id number]]

## 2024-03-05 ENCOUNTER — OFFICE VISIT (OUTPATIENT)
Dept: URGENT CARE | Facility: PHYSICIAN GROUP | Age: 74
End: 2024-03-05
Payer: MEDICARE

## 2024-03-05 VITALS
TEMPERATURE: 98.5 F | BODY MASS INDEX: 27.09 KG/M2 | DIASTOLIC BLOOD PRESSURE: 80 MMHG | WEIGHT: 138 LBS | OXYGEN SATURATION: 94 % | SYSTOLIC BLOOD PRESSURE: 118 MMHG | HEIGHT: 60 IN | RESPIRATION RATE: 18 BRPM | HEART RATE: 62 BPM

## 2024-03-05 DIAGNOSIS — R09.81 NASAL CONGESTION: ICD-10-CM

## 2024-03-05 DIAGNOSIS — R05.1 ACUTE COUGH: ICD-10-CM

## 2024-03-05 LAB
FLUAV RNA SPEC QL NAA+PROBE: NEGATIVE
FLUBV RNA SPEC QL NAA+PROBE: NEGATIVE
RSV RNA SPEC QL NAA+PROBE: NEGATIVE
SARS-COV-2 RNA RESP QL NAA+PROBE: NEGATIVE

## 2024-03-05 PROCEDURE — 99213 OFFICE O/P EST LOW 20 MIN: CPT | Performed by: NURSE PRACTITIONER

## 2024-03-05 PROCEDURE — 0241U POCT CEPHEID COV-2, FLU A/B, RSV - PCR: CPT | Performed by: NURSE PRACTITIONER

## 2024-03-05 PROCEDURE — 3074F SYST BP LT 130 MM HG: CPT | Performed by: NURSE PRACTITIONER

## 2024-03-05 PROCEDURE — 3079F DIAST BP 80-89 MM HG: CPT | Performed by: NURSE PRACTITIONER

## 2024-03-05 ASSESSMENT — FIBROSIS 4 INDEX: FIB4 SCORE: 0.83

## 2024-03-05 NOTE — TELEPHONE ENCOUNTER
Have we ever prescribed this med? Yes.  If yes, what date? 03/08/2023 Dr. Terry    Last OV: 02/16/2023 Dr. Terry    Next OV: no pending appt scheduled     DX: Pulmonary nodule    Moderate persistent asthma without complication    Medications: fluticasone-salmeterol (WIXELA INHUB) 250-50 MCG/ACT AEROSOL POWDER, BREATH ACTIVATED

## 2024-03-05 NOTE — PROGRESS NOTES
Do you have a certain day you would like her to visit. If would have to be a doxy or phone visit, she declined the My Chart. Subjective:   Ezio oMra is a 73 y.o. female who presents for Congestion (Congestion, runny nose, producing chest cough. Fatigue. 3 days. )    Patient is a 73-year-old female presents clinic today with 3-day history of fever, fatigue, body aches, headache, nasal congestion, cough with phlegm, intermittent shortness of breath, and mild metallic taste.    Patient states that she was in the emergency department with her daughter in [place] and believes she may have picked up something there.  She has not had any nausea, vomiting, diarrhea, or chest pain.  She has been taking over-the-counter Tylenol cold and flu/sinus medication which has helped some with symptoms.  She did have a negative COVID test yesterday.      Medications, Allergies, and current problem list reviewed today in Epic.     Objective:     /80   Pulse 62   Temp 36.9 °C (98.5 °F) (Temporal)   Resp 18   Ht 1.524 m (5')   Wt 62.6 kg (138 lb)   SpO2 94%     Physical Exam  Vitals reviewed.   Constitutional:       General: She is not in acute distress.     Appearance: Normal appearance. She is ill-appearing. She is not toxic-appearing.   HENT:      Head: Normocephalic.      Right Ear: Tympanic membrane, ear canal and external ear normal.      Left Ear: Tympanic membrane, ear canal and external ear normal.      Nose: Mucosal edema and rhinorrhea present. No congestion. Rhinorrhea is clear.      Mouth/Throat:      Lips: Pink.      Mouth: Mucous membranes are moist.      Pharynx: Oropharynx is clear. No oropharyngeal exudate or posterior oropharyngeal erythema.   Eyes:      Extraocular Movements: Extraocular movements intact.      Conjunctiva/sclera: Conjunctivae normal.      Pupils: Pupils are equal, round, and reactive to light.   Cardiovascular:      Rate and Rhythm: Normal rate and regular rhythm.   Pulmonary:      Effort: Pulmonary effort is normal. No respiratory distress.      Breath sounds: Normal breath sounds. No stridor. No  wheezing, rhonchi or rales.   Musculoskeletal:         General: Normal range of motion.      Cervical back: Normal range of motion and neck supple.   Lymphadenopathy:      Cervical: No cervical adenopathy.   Skin:     General: Skin is warm and dry.   Neurological:      Mental Status: She is alert and oriented to person, place, and time.   Psychiatric:         Mood and Affect: Mood normal.         Behavior: Behavior normal.         Thought Content: Thought content normal.         Judgment: Judgment normal.       Results for orders placed or performed in visit on 03/05/24   POCT CoV-2, Flu A/B, RSV by PCR   Result Value Ref Range    SARS-CoV-2 by PCR Negative Negative, Invalid    Influenza virus A RNA Negative Negative, Invalid    Influenza virus B, PCR Negative Negative, Invalid    RSV, PCR Negative Negative, Invalid       Assessment/Plan:     Diagnosis and associated orders:     1. Acute cough  POCT CoV-2, Flu A/B, RSV by PCR      2. Nasal congestion  POCT CoV-2, Flu A/B, RSV by PCR         Comments/MDM:     POCT COVID, influenza, RSV all negative.  HPI physical exam finds are consistent with viral illness.  Lung sounds are clear throughout.  Low suspicion at this time for pneumonia.  Vital signs are all within normal range.   Did advise patient on conservative measures for management of symptoms.  Patient is agreeable to pursue adequate rest, adequate hydration, saltwater gargle and nasal saline and nasal toileting for any symptoms of upper respiratory congestion.  Over-the-counter analgesia and antipyretics on a p.r.n. basis as needed for pain and fever.  Did discuss over-the-counter cough medications and to  follow manufactures dosing and safety guidelines.    Recommended following up in clinic if symptoms acutely worsen or if symptoms/fever persist past 7 to 10 days.   Patient was involved with shared decision-making throughout the exam today and verbalizes understanding regards to plan of care, discharge  instructions, and follow-up         Differential diagnosis, natural history, supportive care, and indications for immediate follow-up discussed.    Advised the patient to follow-up with the primary care physician for recheck, reevaluation, and consideration of further management.    I personally reviewed prior external notes and test results pertinent to today's visit as well as additional imaging and testing completed in clinic today.     Please note that this dictation was created using voice recognition software. I have made a reasonable attempt to correct obvious errors, but I expect that there are errors of grammar and possibly content that I did not discover before finalizing the note.

## 2024-03-06 RX ORDER — FLUTICASONE PROPIONATE AND SALMETEROL 250; 50 UG/1; UG/1
1 POWDER RESPIRATORY (INHALATION) EVERY 12 HOURS
Qty: 180 EACH | Refills: 3 | Status: SHIPPED | OUTPATIENT
Start: 2024-03-06 | End: 2024-03-11 | Stop reason: SDUPTHER

## 2024-03-08 ENCOUNTER — APPOINTMENT (OUTPATIENT)
Dept: CARDIOLOGY | Facility: MEDICAL CENTER | Age: 74
End: 2024-03-08
Attending: INTERNAL MEDICINE
Payer: COMMERCIAL

## 2024-03-11 ENCOUNTER — PATIENT MESSAGE (OUTPATIENT)
Dept: SLEEP MEDICINE | Facility: MEDICAL CENTER | Age: 74
End: 2024-03-11
Payer: MEDICARE

## 2024-03-11 DIAGNOSIS — R91.1 PULMONARY NODULE: ICD-10-CM

## 2024-03-11 DIAGNOSIS — J45.40 MODERATE PERSISTENT ASTHMA WITHOUT COMPLICATION: ICD-10-CM

## 2024-03-11 RX ORDER — FLUTICASONE PROPIONATE AND SALMETEROL 250; 50 UG/1; UG/1
1 POWDER RESPIRATORY (INHALATION) EVERY 12 HOURS
Qty: 180 EACH | Refills: 0 | Status: SHIPPED | OUTPATIENT
Start: 2024-03-11

## 2024-03-11 RX ORDER — FLUTICASONE PROPIONATE AND SALMETEROL 250; 50 UG/1; UG/1
1 POWDER RESPIRATORY (INHALATION) EVERY 12 HOURS
Qty: 180 EACH | Refills: 3 | Status: SHIPPED | OUTPATIENT
Start: 2024-03-11 | End: 2024-03-11 | Stop reason: SDUPTHER

## 2024-03-15 ENCOUNTER — OFFICE VISIT (OUTPATIENT)
Dept: CARDIOLOGY | Facility: MEDICAL CENTER | Age: 74
End: 2024-03-15
Attending: INTERNAL MEDICINE
Payer: MEDICARE

## 2024-03-15 VITALS
SYSTOLIC BLOOD PRESSURE: 140 MMHG | OXYGEN SATURATION: 97 % | DIASTOLIC BLOOD PRESSURE: 80 MMHG | HEART RATE: 68 BPM | HEIGHT: 60 IN | BODY MASS INDEX: 27.68 KG/M2 | RESPIRATION RATE: 16 BRPM | WEIGHT: 141 LBS

## 2024-03-15 DIAGNOSIS — I48.0 PAROXYSMAL ATRIAL FIBRILLATION (HCC): ICD-10-CM

## 2024-03-15 DIAGNOSIS — I47.10 PAROXYSMAL SUPRAVENTRICULAR TACHYCARDIA (HCC): ICD-10-CM

## 2024-03-15 DIAGNOSIS — Z79.01 CHRONIC ANTICOAGULATION: ICD-10-CM

## 2024-03-15 DIAGNOSIS — E78.5 DYSLIPIDEMIA: ICD-10-CM

## 2024-03-15 DIAGNOSIS — I10 ESSENTIAL HYPERTENSION: ICD-10-CM

## 2024-03-15 LAB — EKG IMPRESSION: NORMAL

## 2024-03-15 PROCEDURE — 93005 ELECTROCARDIOGRAM TRACING: CPT | Performed by: INTERNAL MEDICINE

## 2024-03-15 PROCEDURE — 3077F SYST BP >= 140 MM HG: CPT | Performed by: INTERNAL MEDICINE

## 2024-03-15 PROCEDURE — 3079F DIAST BP 80-89 MM HG: CPT | Performed by: INTERNAL MEDICINE

## 2024-03-15 PROCEDURE — 99213 OFFICE O/P EST LOW 20 MIN: CPT | Performed by: INTERNAL MEDICINE

## 2024-03-15 PROCEDURE — 99215 OFFICE O/P EST HI 40 MIN: CPT | Performed by: INTERNAL MEDICINE

## 2024-03-15 PROCEDURE — 93010 ELECTROCARDIOGRAM REPORT: CPT | Performed by: INTERNAL MEDICINE

## 2024-03-15 RX ORDER — LOSARTAN POTASSIUM 100 MG/1
100 TABLET ORAL DAILY
Qty: 90 TABLET | Refills: 3 | Status: SHIPPED | OUTPATIENT
Start: 2024-03-15

## 2024-03-15 RX ORDER — FLECAINIDE ACETATE 50 MG/1
50 TABLET ORAL 2 TIMES DAILY
Qty: 180 TABLET | Refills: 3 | Status: SHIPPED | OUTPATIENT
Start: 2024-03-15

## 2024-03-15 ASSESSMENT — ENCOUNTER SYMPTOMS
PALPITATIONS: 0
FOCAL WEAKNESS: 0
CHILLS: 0
HEADACHES: 0
BRUISES/BLEEDS EASILY: 0
RESPIRATORY NEGATIVE: 1
MUSCULOSKELETAL NEGATIVE: 1
FEVER: 0
CARDIOVASCULAR NEGATIVE: 1
MYALGIAS: 0
NAUSEA: 0
WEAKNESS: 0
WEIGHT LOSS: 0
BLURRED VISION: 1
SHORTNESS OF BREATH: 0
DIZZINESS: 0
ABDOMINAL PAIN: 0
COUGH: 0
CLAUDICATION: 0
PSYCHIATRIC NEGATIVE: 1
DOUBLE VISION: 0
DEPRESSION: 0
CONSTITUTIONAL NEGATIVE: 1
VOMITING: 0
NEUROLOGICAL NEGATIVE: 1
GASTROINTESTINAL NEGATIVE: 1
NERVOUS/ANXIOUS: 0

## 2024-03-15 ASSESSMENT — FIBROSIS 4 INDEX: FIB4 SCORE: 0.83

## 2024-03-15 NOTE — PROGRESS NOTES
Chief Complaint   Patient presents with    Palpitations    Paroxysmal Supraventricular Tachycardia (PSVT)    Atrial Fibrillation     F/V Dx: Paroxysmal atrial fibrillation (HCC)       Subjective     Ezio Mora is a 73 y.o. female who presents today for new patient establishment for atrial fibrillation on chronic anticoagulation therapy, supraventricular tachycardia, hypertension and hyperlipidemia, history of CVA.    Since the patient's last visit on 08/14/2 with Ramon Johnson who has since left our practice, she has been doing well clinically. She denies chest pain, shortness of breath, palpitations, nausea/vomiting or diaphoresis. She keeps active walking. She was last seen in our office with me on 07/16/13.     Past Medical History:   Diagnosis Date    ASTHMA     Atrial fibrillation (HCC)     Chronic fatigue 09/28/2016    Degenerative arthritis of spine     Hyperlipidemia     Hypertension     Mild intermittent asthma with acute exacerbation 06/18/2015    Post-menopause on HRT (hormone replacement therapy)     Pulmonary nodule 06/28/2016    Pulmonary, stable CT 8/15    Rheumatic fever     SVT (supraventricular tachycardia)     Weight gain 06/28/2016     Past Surgical History:   Procedure Laterality Date    FUSION, SPINE, LUMBAR, PLIF      PRIMARY C SECTION      TUBAL LIGATION       Family History   Problem Relation Age of Onset    Other Mother         global decline due to age    Heart Disease Father     Thyroid Child      Social History     Socioeconomic History    Marital status:      Spouse name: Not on file    Number of children: Not on file    Years of education: Not on file    Highest education level: Not on file   Occupational History    Not on file   Tobacco Use    Smoking status: Never    Smokeless tobacco: Never   Vaping Use    Vaping Use: Never used   Substance and Sexual Activity    Alcohol use: Yes     Alcohol/week: 0.6 - 1.2 oz     Types: 1 - 2 Glasses of wine per week      Comment: occ wine    Drug use: No    Sexual activity: Not on file   Other Topics Concern    Not on file   Social History Narrative         Social Determinants of Health     Financial Resource Strain: Not on file   Food Insecurity: Not on file   Transportation Needs: Not on file   Physical Activity: Not on file   Stress: Not on file   Social Connections: Not on file   Intimate Partner Violence: Not on file   Housing Stability: Not on file     Allergies   Allergen Reactions    Lisinopril Unspecified    Terramycin [Oxytetracycline]      Rash as a child      (Medications reviewed.)  Outpatient Encounter Medications as of 3/15/2024   Medication Sig Dispense Refill    fluticasone-salmeterol (WIXELA INHUB) 250-50 MCG/ACT AEROSOL POWDER, BREATH ACTIVATED Inhale 1 Puff every 12 hours. 180 Each 0    montelukast (SINGULAIR) 10 MG Tab Take 1 Tablet by mouth every day. 90 Tablet 3    atorvastatin (LIPITOR) 80 MG tablet TAKE 1 TABLET BY MOUTH EVERY EVENING 80 Tablet 2    traZODone (DESYREL) 50 MG Tab TAKE 1 TABLET BY MOUTH EVERY EVENING 90 Tablet 3    losartan (COZAAR) 50 MG Tab Take 1 Tablet by mouth every day. 90 Tablet 2    ELIQUIS 5 MG Tab TAKE 1 TABLET BY MOUTH TWICE DAILY 180 Tablet 0    flecainide (TAMBOCOR) 50 MG tablet Take 1 Tablet by mouth 2 times a day. 180 Tablet 3    metoprolol tartrate (LOPRESSOR) 25 MG Tab Take 1 Tablet by mouth 2 times a day. 200 Tablet 3    Magnesium Citrate 100 MG Tab Take  by mouth every day.      Calcium Carbonate 1500 (600 Ca) MG Tab Take  by mouth every day.      vitamin D (VITAMIND D3) 1000 UNIT Tab Take 1 Tablet by mouth every day.      albuterol (PROAIR HFA) 108 (90 Base) MCG/ACT Aero Soln inhalation aerosol Inhale 2 Puffs by mouth every 6 hours as needed. 8.5 g 3     No facility-administered encounter medications on file as of 3/15/2024.     Review of Systems   Constitutional: Negative.  Negative for chills, fever, malaise/fatigue and weight loss.   HENT: Negative.  Negative for  hearing loss.    Eyes:  Positive for blurred vision. Negative for double vision.   Respiratory: Negative.  Negative for cough and shortness of breath.    Cardiovascular: Negative.  Negative for chest pain, palpitations, claudication and leg swelling.   Gastrointestinal: Negative.  Negative for abdominal pain, nausea and vomiting.   Genitourinary: Negative.  Negative for dysuria and urgency.   Musculoskeletal: Negative.  Negative for joint pain and myalgias.   Skin: Negative.  Negative for itching and rash.   Neurological: Negative.  Negative for dizziness, focal weakness, weakness and headaches.   Endo/Heme/Allergies: Negative.  Does not bruise/bleed easily.   Psychiatric/Behavioral: Negative.  Negative for depression. The patient is not nervous/anxious.               Objective     BP (!) 140/80 (BP Location: Left arm, Patient Position: Sitting, BP Cuff Size: Adult)   Pulse 68   Resp 16   Ht 1.524 m (5')   Wt 64 kg (141 lb)   SpO2 97%   BMI 27.54 kg/m²     Physical Exam  Constitutional:       Appearance: Normal appearance. She is well-developed and normal weight.   HENT:      Head: Normocephalic and atraumatic.   Neck:      Vascular: No JVD.   Cardiovascular:      Rate and Rhythm: Normal rate and regular rhythm.      Heart sounds: Normal heart sounds.   Pulmonary:      Effort: Pulmonary effort is normal.      Breath sounds: Normal breath sounds.   Abdominal:      General: Bowel sounds are normal.      Palpations: Abdomen is soft.      Comments: No hepatosplenomegaly.   Musculoskeletal:         General: Normal range of motion.   Lymphadenopathy:      Cervical: No cervical adenopathy.   Skin:     General: Skin is warm and dry.   Neurological:      Mental Status: She is alert and oriented to person, place, and time.            CARDIAC STUDIES/PROCEDURES:    CARDIAC CATHETERIZATION CONCLUSIONS Jevon Roe (2/25/2022)   Nonobstructive CAD   Successful LHC and coronary angiography via right radial approach.    (study result reviewed)     ECHOCARDIOGRAM CONCLUSIONS (08/30/22)  Normal left ventricular chamber size.  The left ventricular ejection fraction is visually estimated to be 60%.  Normal diastolic function.  Normal inferior vena cava size and inspiratory collapse.  Estimated right ventricular systolic pressure is 24  mmHg.  (study result reviewed)      ECHOCARDIOGRAM CONCLUSIONS (05/08/13)  Echocardiogram showing ejection fraction of 45% mild mitral valve prolapse with mild mitral regurgitation.     EKG was ordered for atrial fibrillation, performed on (03/15/24) was reviewed: EKG, personally interpreted shows sinus rhythm.   EKG performed on (05/08/13) was reviewed: EKG shows normal sinus rhythm.     HOLTER MONITOR (05/21/13)  24 hour Holter monitor showing occasional premature atrial contractions and premature ventricular contractions.    Laboratory results of (11/28/23) were reviewed. Cholesterol profile of 191/48/105/75 mg/dL noted.   Laboratory results of (08/30/22) were reviewed. Cholesterol profile of 226/56/103/112 mg/dL noted.     MYOCARDIAL PERFUSION STUDY CONCLUSIONS (02/27/23)  NUCLEAR IMAGING INTERPRETATION  No evidence of significant jeopardized viable myocardium or prior myocardial infarction.  Normal left ventricular size, ejection fraction, and wall motion.  ECG INTERPRETATION  Nondiagnostic stress ECG due to rate related left bundle branch block.  (study result reviewed)     ZIO PATCH REPORT (08/31/22-09/09/22)   Zio Patch study showing predominately sinus rhythm and rate related non-specific intra-ventricular conduction delay with maximum rate of 194 bpm, minimum rate of 46 bpm, average of 72 bpm.   Atrial fibrillation: None.   Supraventricular tachycardia: 184 episodes of supraventricular tachycardia/atrial tachycardia with longest and fastest interval lasting 4 minutes and 46 seconds with a max rate of 194 bpm noted.   Pauses: None.   Heart block: None.   Ventricular tachycardia: None.   <1%  burden of premature ventricular contractions and frequent 5.8% burden of premature atrial contractions.     Assessment & Plan     1. Paroxysmal atrial fibrillation (HCC)  EKG      2. Chronic anticoagulation        3. PSVT (paroxysmal supraventricular tachycardia) (HCC)        4. Essential hypertension        5. Dyslipidemia            Medical Decision Making: Today's Assessment/Status/Plan:        Atrial fibrillation on anticoagulation therapy (Eliquis): She is doing well on anticoagulation without palpitations.    Paroxysmal supraventricular tachycardia: She is clinically doing well without recurrence of her palpitations on flecainide and beta blockade therapy.  We will continue with current care.   Hypertension: Blood pressure has been high. We will increase losartan and reassess the blood pressure.   Hyperlipidemia: She is doing well on statin therapy without myalgia symptoms.   History of stroke (07/2022): She remains clinically stable without recurrence of stroke symptoms.   Additional information: She and her  lives in [place].   Greater than 45 minutes of time was spent to review all above information; of which more than 50% of the time was face to face reviewing thee patient's medical issues, medication evaluation, study result review, lab results review, records from [facility]    We will follow up in 3 months.    CC Keke Tracey

## 2024-03-31 DIAGNOSIS — I48.0 PAROXYSMAL ATRIAL FIBRILLATION (HCC): ICD-10-CM

## 2024-04-01 RX ORDER — APIXABAN 5 MG/1
5 TABLET, FILM COATED ORAL 2 TIMES DAILY
Qty: 180 TABLET | Refills: 0 | Status: SHIPPED | OUTPATIENT
Start: 2024-04-01

## 2024-04-09 ENCOUNTER — OFFICE VISIT (OUTPATIENT)
Dept: SLEEP MEDICINE | Facility: MEDICAL CENTER | Age: 74
End: 2024-04-09
Attending: NURSE PRACTITIONER
Payer: MEDICARE

## 2024-04-09 VITALS
RESPIRATION RATE: 16 BRPM | OXYGEN SATURATION: 91 % | BODY MASS INDEX: 27.29 KG/M2 | SYSTOLIC BLOOD PRESSURE: 118 MMHG | HEIGHT: 60 IN | DIASTOLIC BLOOD PRESSURE: 70 MMHG | WEIGHT: 139 LBS | HEART RATE: 68 BPM

## 2024-04-09 DIAGNOSIS — R91.1 PULMONARY NODULE: ICD-10-CM

## 2024-04-09 DIAGNOSIS — J45.20 MILD INTERMITTENT ASTHMA WITHOUT COMPLICATION: ICD-10-CM

## 2024-04-09 PROCEDURE — 99213 OFFICE O/P EST LOW 20 MIN: CPT | Performed by: NURSE PRACTITIONER

## 2024-04-09 PROCEDURE — 99214 OFFICE O/P EST MOD 30 MIN: CPT | Performed by: NURSE PRACTITIONER

## 2024-04-09 PROCEDURE — 3078F DIAST BP <80 MM HG: CPT | Performed by: NURSE PRACTITIONER

## 2024-04-09 PROCEDURE — 3074F SYST BP LT 130 MM HG: CPT | Performed by: NURSE PRACTITIONER

## 2024-04-09 RX ORDER — ALBUTEROL SULFATE 90 UG/1
2 AEROSOL, METERED RESPIRATORY (INHALATION) EVERY 6 HOURS PRN
Qty: 8.5 G | Refills: 3 | Status: SHIPPED | OUTPATIENT
Start: 2024-04-09

## 2024-04-09 RX ORDER — MONTELUKAST SODIUM 10 MG/1
10 TABLET ORAL
Qty: 90 TABLET | Refills: 3 | Status: SHIPPED | OUTPATIENT
Start: 2024-04-09

## 2024-04-09 ASSESSMENT — PATIENT HEALTH QUESTIONNAIRE - PHQ9: CLINICAL INTERPRETATION OF PHQ2 SCORE: 0

## 2024-04-09 ASSESSMENT — FIBROSIS 4 INDEX: FIB4 SCORE: 0.83

## 2024-04-09 NOTE — PROGRESS NOTES
Chief Complaint   Patient presents with    Follow-Up     LAST SEEN 02/16/2023 MARIS CISSE  F/V PULMONARY       HPI:  Ezio Mora is a 73 y.o. year old female here today for follow-up on.  Last seen 2/16/2023 by Dr. Cisse.  Past medical history includes asthma, A-fib, hypertension, and history of CVA.    Patient is currently on Wixela 250 Powder and is not requiring albuterol rescue inhalers at this time.  She also takes montelukast daily.  Currently she complains of vocal cord irritation, hoarse voice, and persistent dry cough that has been ongoing x 2 months. She feels that she is not able to take a deep inhalation of it of the powder get stuck in her mouth.  She denies any new or worsening dyspnea or shortness of breath or any wheezing, chest tightness, or nighttime symptoms.  She denies any exacerbations since last visit.  Patient does suffer from allergies with postnasal drip.  Does use Flonase.    ROS: As per HPI and otherwise negative if not stated.    Past Medical History:   Diagnosis Date    ASTHMA     Atrial fibrillation (HCC)     Chronic fatigue 09/28/2016    Degenerative arthritis of spine     Hyperlipidemia     Hypertension     Mild intermittent asthma with acute exacerbation 06/18/2015    Post-menopause on HRT (hormone replacement therapy)     Pulmonary nodule 06/28/2016    Pulmonary, stable CT 8/15    Rheumatic fever     SVT (supraventricular tachycardia)     Weight gain 06/28/2016       Past Surgical History:   Procedure Laterality Date    FUSION, SPINE, LUMBAR, PLIF      PRIMARY C SECTION      TUBAL LIGATION         Family History   Problem Relation Age of Onset    Other Mother         global decline due to age    Heart Disease Father     Thyroid Child        Allergies as of 04/09/2024 - Reviewed 04/09/2024   Allergen Reaction Noted    Lisinopril Unspecified 08/24/2021    Terramycin [oxytetracycline]  05/18/2015        Vitals:  /70 (BP Location: Left arm, Patient Position:  Sitting, BP Cuff Size: Adult)   Pulse 68   Resp 16   Ht 1.524 m (5')   Wt 63 kg (139 lb)   SpO2 91%     Current medications as of today   Current Outpatient Medications   Medication Sig Dispense Refill    albuterol (PROAIR HFA) 108 (90 Base) MCG/ACT Aero Soln inhalation aerosol Inhale 2 Puffs every 6 hours as needed for Shortness of Breath. 8.5 g 3    montelukast (SINGULAIR) 10 MG Tab Take 1 Tablet by mouth every day. 90 Tablet 3    ELIQUIS 5 MG Tab TAKE 1 TABLET BY MOUTH TWICE DAILY 180 Tablet 0    metoprolol tartrate (LOPRESSOR) 25 MG Tab Take 1 Tablet by mouth 2 times a day. 200 Tablet 3    flecainide (TAMBOCOR) 50 MG tablet Take 1 Tablet by mouth 2 times a day. 180 Tablet 3    losartan (COZAAR) 100 MG Tab Take 1 Tablet by mouth every day. 90 Tablet 3    fluticasone-salmeterol (WIXELA INHUB) 250-50 MCG/ACT AEROSOL POWDER, BREATH ACTIVATED Inhale 1 Puff every 12 hours. 180 Each 0    atorvastatin (LIPITOR) 80 MG tablet TAKE 1 TABLET BY MOUTH EVERY EVENING 80 Tablet 2    traZODone (DESYREL) 50 MG Tab TAKE 1 TABLET BY MOUTH EVERY EVENING 90 Tablet 3    Magnesium Citrate 100 MG Tab Take  by mouth every day.      Calcium Carbonate 1500 (600 Ca) MG Tab Take  by mouth every day.      vitamin D (VITAMIND D3) 1000 UNIT Tab Take 1 Tablet by mouth every day.       No current facility-administered medications for this visit.         Physical Exam:   Gen:           Alert and oriented, No apparent distress. Mood and affect appropriate, normal interaction with examiner.  Eyes:          PERRL, EOM intact, sclere white, conjunctive moist.  Ears:          Not examined.   Hearing:     Grossly intact.  Nose:          Normal, no lesions or deformities.  Dentition:    Good dentition.  Oropharynx:   Tongue normal, posterior pharynx without erythema or exudate.  Neck:        Supple, trachea midline, no masses.  Respiratory Effort: No intercostal retractions or use of accessory muscles.   Lung Auscultation:      Clear to auscultation  bilaterally; no rales, rhonchi or wheezing.  CV:            Regular rate and rhythm. No murmurs, rubs or gallops.  Abd:           Not examined.   Lymphadenopathy: Not examined.  Gait and Station: Normal.  Digits and Nails: No clubbing, cyanosis, petechiae, or nodes.   Cranial Nerves: II-XII grossly intact.  Skin:        No rashes, lesions or ulcers noted.               Ext:           No cyanosis or edema.      Assessment:  1. Pulmonary nodule        2. Mild intermittent asthma without complication  albuterol (PROAIR HFA) 108 (90 Base) MCG/ACT Aero Soln inhalation aerosol    montelukast (SINGULAIR) 10 MG Tab          Plan:  Stable on previous CT chest.  No follow-up CT was recommended as patient has no history of cancer and is a never smoker.  Recommended tapering off of Wixela.  If asthmatic symptoms arise can resume use.  Wixela taper will be used to determine whether cough and hoarse voice are associated with vocal cord irritation.  Does have a history of peripheral eosinophilia in the past and can consider biologic therapy if indicated.  If ICS/LABA is still necessary, can consider other inhalers.  Options renewed for albuterol and montelukast at this time.  Patient's main symptom is cough and denies wheezing, chest tightness or shortness of breath.    Please note that this dictation was created using voice recognition software. I have made every reasonable attempt to correct obvious errors, but it is possible there are errors of grammar and possibly content that I did not discover before finalizing the note.

## 2024-04-25 ENCOUNTER — PATIENT MESSAGE (OUTPATIENT)
Dept: SLEEP MEDICINE | Facility: MEDICAL CENTER | Age: 74
End: 2024-04-25

## 2024-06-06 ENCOUNTER — PATIENT MESSAGE (OUTPATIENT)
Dept: CARDIOLOGY | Facility: MEDICAL CENTER | Age: 74
End: 2024-06-06
Payer: MEDICARE

## 2024-06-06 DIAGNOSIS — E78.5 DYSLIPIDEMIA: ICD-10-CM

## 2024-06-17 DIAGNOSIS — J45.40 MODERATE PERSISTENT ASTHMA WITHOUT COMPLICATION: ICD-10-CM

## 2024-06-17 DIAGNOSIS — R91.1 PULMONARY NODULE: ICD-10-CM

## 2024-06-18 ENCOUNTER — TELEPHONE (OUTPATIENT)
Dept: PHARMACY | Facility: MEDICAL CENTER | Age: 74
End: 2024-06-18

## 2024-06-18 ENCOUNTER — APPOINTMENT (OUTPATIENT)
Dept: MEDICAL GROUP | Facility: MEDICAL CENTER | Age: 74
End: 2024-06-18
Payer: MEDICARE

## 2024-06-18 RX ORDER — FLUTICASONE PROPIONATE AND SALMETEROL 250; 50 UG/1; UG/1
1 POWDER RESPIRATORY (INHALATION) EVERY 12 HOURS
Qty: 180 EACH | Refills: 0 | Status: SHIPPED | OUTPATIENT
Start: 2024-06-18

## 2024-06-18 NOTE — TELEPHONE ENCOUNTER
Have we ever prescribed this med? Yes.  If yes, what date? 03/11/24    Last OV: 04/09/24 with Jed SEARS     Next OV: No Pending appt.     DX: Moderate persistent asthma without complication [J45.40]     Medications:   Requested Prescriptions     Pending Prescriptions Disp Refills    WIXELA INHUB 250-50 MCG/ACT AEROSOL POWDER, BREATH ACTIVATED [Pharmacy Med Name: WIXELA INHUB DISKUS 250/50MCG 60S] 180 Each 0     Sig: INHALE 1 PUFF BY MOUTH EVERY 12 HOURS

## 2024-06-18 NOTE — TELEPHONE ENCOUNTER
Received Refill request via [facility]  for WIXELA INHUB 250-50 MCG/ACT AEROSOL POWDER, BREATH ACTIVATED . (Quantity:180, Day Supply:90)     Insurance: Medicare  Member ID:  [id number]  BIN: [id number]  PCN: [id number]  Group: [id number]     Ran Test claim via [place] & medication  pays for $54.36 pt declined services with [facility].     Will release prescription to preferred pharmacy for processing: [email or web address] #[id number]

## 2024-06-19 LAB
CHOLEST SERPL-MCNC: 174 MG/DL
HDLC SERPL-MCNC: 95 MG/DL
LDLC SERPL CALC-MCNC: 66 MG/DL
TRIGL SERPL-MCNC: 57 MG/DL

## 2024-06-20 DIAGNOSIS — I48.0 PAROXYSMAL ATRIAL FIBRILLATION (HCC): ICD-10-CM

## 2024-07-03 ENCOUNTER — OFFICE VISIT (OUTPATIENT)
Dept: CARDIOLOGY | Facility: MEDICAL CENTER | Age: 74
End: 2024-07-03
Attending: INTERNAL MEDICINE
Payer: MEDICARE

## 2024-07-03 VITALS
RESPIRATION RATE: 14 BRPM | DIASTOLIC BLOOD PRESSURE: 80 MMHG | BODY MASS INDEX: 27.68 KG/M2 | SYSTOLIC BLOOD PRESSURE: 142 MMHG | WEIGHT: 141 LBS | OXYGEN SATURATION: 95 % | HEIGHT: 60 IN | HEART RATE: 70 BPM

## 2024-07-03 DIAGNOSIS — Z79.01 CHRONIC ANTICOAGULATION: ICD-10-CM

## 2024-07-03 DIAGNOSIS — I10 ESSENTIAL HYPERTENSION: ICD-10-CM

## 2024-07-03 DIAGNOSIS — I48.0 PAROXYSMAL ATRIAL FIBRILLATION (HCC): ICD-10-CM

## 2024-07-03 DIAGNOSIS — I47.10 PAROXYSMAL SUPRAVENTRICULAR TACHYCARDIA (HCC): ICD-10-CM

## 2024-07-03 DIAGNOSIS — E78.5 DYSLIPIDEMIA: ICD-10-CM

## 2024-07-03 PROBLEM — I48.91 ATRIAL FIBRILLATION, NEW ONSET (HCC): Status: RESOLVED | Noted: 2022-10-27 | Resolved: 2024-07-03

## 2024-07-03 PROCEDURE — 99213 OFFICE O/P EST LOW 20 MIN: CPT | Performed by: INTERNAL MEDICINE

## 2024-07-03 PROCEDURE — 99214 OFFICE O/P EST MOD 30 MIN: CPT | Performed by: INTERNAL MEDICINE

## 2024-07-03 PROCEDURE — 3079F DIAST BP 80-89 MM HG: CPT | Performed by: INTERNAL MEDICINE

## 2024-07-03 PROCEDURE — 3077F SYST BP >= 140 MM HG: CPT | Performed by: INTERNAL MEDICINE

## 2024-07-03 ASSESSMENT — ENCOUNTER SYMPTOMS
COUGH: 0
DIZZINESS: 0
PALPITATIONS: 0
VOMITING: 0
MUSCULOSKELETAL NEGATIVE: 1
CHILLS: 0
CONSTITUTIONAL NEGATIVE: 1
SHORTNESS OF BREATH: 1
EYES NEGATIVE: 1
HEADACHES: 0
CLAUDICATION: 0
BRUISES/BLEEDS EASILY: 0
PSYCHIATRIC NEGATIVE: 1
FEVER: 0
NERVOUS/ANXIOUS: 0
ABDOMINAL PAIN: 0
GASTROINTESTINAL NEGATIVE: 1
NEUROLOGICAL NEGATIVE: 1
FOCAL WEAKNESS: 0
WEIGHT LOSS: 0
CARDIOVASCULAR NEGATIVE: 1
MYALGIAS: 0
WEAKNESS: 0
DEPRESSION: 0
DOUBLE VISION: 0
BLURRED VISION: 0
NAUSEA: 0

## 2024-07-03 ASSESSMENT — FIBROSIS 4 INDEX: FIB4 SCORE: 0.83

## 2024-08-06 NOTE — PROGRESS NOTES
Pulmonary Clinic Note    Date of Visit: 8/8/2024     Chief Complaint:  Chief Complaint   Patient presents with    Follow-Up      Pulmonary nodule/Asthma. Last seen 04/09/24     HPI:   Ezio Mora is a very pleasant 73 y.o. year old female never smoker, with a PMHx of asthma, A-fib on Eliquis, history of CVA, LBBB, pulmonary nodule, allergic rhinitis who presented to the Pulmonary Clinic for a regular follow up. Last seen in the office on 4/9/2024 with ERNU Justin.     Patient is followed by the pulmonary office for asthma.  PFTs in 2023 showed an FEV1 of 1.94 L or 101%, FEV1/6087%, %, DLCO 113% predicted, without positive bronchodilator response.  Eosinophils in 2022 were 0.52.  Patient is currently on Wixela 250, Singulair, and albuterol as needed.  Patient also has a history of allergies and has been on Claritin and Flonase.  CT chest in 2016 shows multiple subcentimeter pulmonary nodules, which has been stable since 2015.    Interval events:   8/8/2024-  Patient states that she had a severe cough for 4 months that was dry in nature.  She could not find a triggering cause for the cough and the cough resolved on its own.  She states after the cough resolved she found that her voice was hoarse.  She did discontinue Wixela for a short amount of time, which she said slightly helped with the hoarse voice, but she had a increase in her shortness of breath, so she resumed Wixela.  She continues to use Singulair.  She rarely uses albuterol due to having tachycardia after use.  She has not had any exacerbations.  She denies any shortness of breath or wheezing.  She states she does have allergies, but is reluctant to take any other OTC antihistamines due to her blood pressure and A-fib.    Exacerbations this year:  0    Current medication regimen:  Wixela 250, Singulair, Albuterol    Oxygen use:  None    MMRC Grade: 0- Breathless only during strenuous exercise    Past Medical History:   Diagnosis  Date    ASTHMA     Atrial fibrillation (HCC)     Chronic fatigue 09/28/2016    Degenerative arthritis of spine     Hyperlipidemia     Hypertension     Mild intermittent asthma with acute exacerbation 06/18/2015    Post-menopause on HRT (hormone replacement therapy)     Pulmonary nodule 06/28/2016    Pulmonary, stable CT 8/15    Rheumatic fever     SVT (supraventricular tachycardia) (HCC)     Weight gain 06/28/2016     Past Surgical History:   Procedure Laterality Date    FUSION, SPINE, LUMBAR, PLIF      PRIMARY C SECTION      TUBAL LIGATION       Social History     Socioeconomic History    Marital status:      Spouse name: Not on file    Number of children: Not on file    Years of education: Not on file    Highest education level: Not on file   Occupational History    Not on file   Tobacco Use    Smoking status: Never    Smokeless tobacco: Never   Vaping Use    Vaping status: Never Used   Substance and Sexual Activity    Alcohol use: Yes     Alcohol/week: 0.6 - 1.2 oz     Types: 1 - 2 Glasses of wine per week     Comment: occ wine    Drug use: Never    Sexual activity: Not on file   Other Topics Concern    Not on file   Social History Narrative         Social Determinants of Health     Financial Resource Strain: Not on file   Food Insecurity: Not on file   Transportation Needs: Not on file   Physical Activity: Not on file   Stress: Not on file   Social Connections: Not on file   Intimate Partner Violence: Not on file   Housing Stability: Not on file        Family History   Problem Relation Age of Onset    Other Mother         global decline due to age    Heart Disease Father     Thyroid Child      Current Outpatient Medications on File Prior to Visit   Medication Sig Dispense Refill    albuterol (PROAIR HFA) 108 (90 Base) MCG/ACT Aero Soln inhalation aerosol Inhale 2 Puffs every 6 hours as needed for Shortness of Breath. 8.5 g 3    montelukast (SINGULAIR) 10 MG Tab Take 1 Tablet by mouth every day. 90  Tablet 3    ELIQUIS 5 MG Tab TAKE 1 TABLET BY MOUTH TWICE DAILY 180 Tablet 0    metoprolol tartrate (LOPRESSOR) 25 MG Tab Take 1 Tablet by mouth 2 times a day. 200 Tablet 3    flecainide (TAMBOCOR) 50 MG tablet Take 1 Tablet by mouth 2 times a day. 180 Tablet 3    losartan (COZAAR) 100 MG Tab Take 1 Tablet by mouth every day. 90 Tablet 3    atorvastatin (LIPITOR) 80 MG tablet TAKE 1 TABLET BY MOUTH EVERY EVENING 80 Tablet 2    traZODone (DESYREL) 50 MG Tab TAKE 1 TABLET BY MOUTH EVERY EVENING 90 Tablet 3    Magnesium Citrate 100 MG Tab Take  by mouth every day.      Calcium Carbonate 1500 (600 Ca) MG Tab Take  by mouth every day.      vitamin D (VITAMIND D3) 1000 UNIT Tab Take 1 Tablet by mouth every day.       No current facility-administered medications on file prior to visit.     Allergies: Lisinopril and Terramycin [oxytetracycline]    ROS:   Review of Systems   Constitutional:  Negative for chills, diaphoresis, fever and malaise/fatigue.   HENT:  Negative for congestion and sinus pain.    Respiratory:  Positive for cough. Negative for hemoptysis, sputum production, shortness of breath and wheezing.    Cardiovascular:  Negative for chest pain, palpitations and leg swelling.   Gastrointestinal:  Negative for diarrhea, heartburn, nausea and vomiting.   Musculoskeletal:  Negative for falls and myalgias.   Neurological:  Negative for dizziness, weakness and headaches.     Vitals:  /62 (BP Location: Right arm, Patient Position: Sitting, BP Cuff Size: Adult)   Pulse 74   Ht 1.524 m (5')   Wt 63 kg (139 lb)   SpO2 93%     Physical Exam  Constitutional:       General: She is not in acute distress.     Appearance: Normal appearance. She is not ill-appearing, toxic-appearing or diaphoretic.   Cardiovascular:      Rate and Rhythm: Normal rate and regular rhythm.      Heart sounds: No murmur heard.     No friction rub. No gallop.   Pulmonary:      Effort: No respiratory distress.      Breath sounds: Normal breath  sounds. No stridor. No wheezing, rhonchi or rales.   Musculoskeletal:         General: No swelling.      Right lower leg: No edema.      Left lower leg: No edema.   Skin:     General: Skin is warm.   Neurological:      General: No focal deficit present.      Mental Status: She is alert and oriented to person, place, and time.   Psychiatric:         Mood and Affect: Mood normal.         Behavior: Behavior normal.         Thought Content: Thought content normal.         Judgment: Judgment normal.         Laboratory Data:  PFTs: (Date: 2/6/2021)-      Impression:  1.  Spirometry is normal.  2.  There is no significant change postbronchodilator.  3.  The flow volume loop is consistent with the spirometry data.  4.  Lung volumes are normal.  5.  Diffusion capacity is normal.  6.  In comparison to the prior study from 08/27/2020, the FEV1 has improved from 1.88 to 1.94.    CT Chest: (Date: 10/6/2016)-  Impression:  Multiple stable pulmonary nodules, the largest width measuring 5 mm in the right lower lobe.  One tiny 2.4 mm right middle lobe nodule is new.  Atherosclerotic plaque.      Assessment and Plan:    Problem List Items Addressed This Visit       Moderate persistent asthma without complication     PFTs in 2023 showed an FEV1 of 1.94 L or 101%, FEV1/6087%, %, DLCO 113% predicted, without positive bronchodilator response.  Eosinophils in 2022 were 0.52.  Patient is currently on Wixela 250, Singulair, and albuterol as needed.  Patient has residual hoarse voice after having a debilitating cough for 4 months.  The cough is resolved and she is now asymptomatic.  --Switch Wixela to Symbicort 160, spacer with instructions have been provided.  They will see if the mist is better tolerated over the powder.  --Switch to albuterol to Xopenex given cardiac side effects  --Referral sent to [place] speech to rule out vocal cord dysfunction  --Advised patient to continue to remain active  --Advised patient to remain  up-to-date on all vaccines         Relevant Medications    budesonide-formoterol (SYMBICORT) 160-4.5 MCG/ACT Aerosol    Spacer/Aero-Holding Chambers Device    Environmental and seasonal allergies     As above.         Pulmonary nodule     CT chest in 2016 shows multiple subcentimeter pulmonary nodules, which has been stable since 2015.  Patient is low risk.  --No need for further follow-up.         Hoarse voice quality     As above.         Relevant Orders    Referral to Speech Therapy     Other Visit Diagnoses       Need for vaccination        Relevant Orders    Pneumococcal Conjugate Vaccine 20-Valent (6 wks+) (Completed)          Diagnostic studies have been reviewed with the patient.    Return in about 3 months (around 11/8/2024), or if symptoms worsen or fail to improve, for ASTHMA, with Justina.     This note was generated using voice recognition software which has a chance of producing errors of grammar and possibly content.  I have made every reasonable attempt to find and correct any obvious errors, but it should be expected that some may not be found prior to finalization of this note.    Time spent in record review prior to patient arrival, reviewing results, and in face-to-face encounter totaled 30 min.  __________  RENU Amaya  Pulmonary Medicine  Formerly [facility]

## 2024-08-08 ENCOUNTER — OFFICE VISIT (OUTPATIENT)
Dept: SLEEP MEDICINE | Facility: MEDICAL CENTER | Age: 74
End: 2024-08-08
Payer: MEDICARE

## 2024-08-08 VITALS
HEART RATE: 74 BPM | BODY MASS INDEX: 27.29 KG/M2 | DIASTOLIC BLOOD PRESSURE: 62 MMHG | WEIGHT: 139 LBS | SYSTOLIC BLOOD PRESSURE: 122 MMHG | HEIGHT: 60 IN | OXYGEN SATURATION: 93 %

## 2024-08-08 DIAGNOSIS — J30.89 ENVIRONMENTAL AND SEASONAL ALLERGIES: ICD-10-CM

## 2024-08-08 DIAGNOSIS — R49.0 HOARSE VOICE QUALITY: ICD-10-CM

## 2024-08-08 DIAGNOSIS — J45.40 MODERATE PERSISTENT ASTHMA WITHOUT COMPLICATION: ICD-10-CM

## 2024-08-08 DIAGNOSIS — Z23 NEED FOR VACCINATION: ICD-10-CM

## 2024-08-08 DIAGNOSIS — R91.1 PULMONARY NODULE: ICD-10-CM

## 2024-08-08 PROCEDURE — 90471 IMMUNIZATION ADMIN: CPT

## 2024-08-08 PROCEDURE — 99214 OFFICE O/P EST MOD 30 MIN: CPT

## 2024-08-08 PROCEDURE — 99213 OFFICE O/P EST LOW 20 MIN: CPT | Mod: 25

## 2024-08-08 PROCEDURE — 3078F DIAST BP <80 MM HG: CPT

## 2024-08-08 PROCEDURE — 3074F SYST BP LT 130 MM HG: CPT

## 2024-08-08 RX ORDER — BUDESONIDE AND FORMOTEROL FUMARATE DIHYDRATE 160; 4.5 UG/1; UG/1
2 AEROSOL RESPIRATORY (INHALATION) 2 TIMES DAILY
Qty: 30.6 G | Refills: 3 | Status: SHIPPED | OUTPATIENT
Start: 2024-08-08

## 2024-08-08 ASSESSMENT — ENCOUNTER SYMPTOMS
CHILLS: 0
FEVER: 0
SPUTUM PRODUCTION: 0
WHEEZING: 0
SHORTNESS OF BREATH: 0
HEADACHES: 0
COUGH: 1
MYALGIAS: 0
FALLS: 0
NAUSEA: 0
PALPITATIONS: 0
HEARTBURN: 0
DIZZINESS: 0
DIAPHORESIS: 0
DIARRHEA: 0
HEMOPTYSIS: 0
WEAKNESS: 0
VOMITING: 0
SINUS PAIN: 0

## 2024-08-08 ASSESSMENT — FIBROSIS 4 INDEX: FIB4 SCORE: 0.83

## 2024-08-08 NOTE — ASSESSMENT & PLAN NOTE
PFTs in 2023 showed an FEV1 of 1.94 L or 101%, FEV1/6087%, %, DLCO 113% predicted, without positive bronchodilator response.  Eosinophils in 2022 were 0.52.  Patient is currently on Wixela 250, Singulair, and albuterol as needed.  Patient has residual hoarse voice after having a debilitating cough for 4 months.  The cough is resolved and she is now asymptomatic.  --Switch Wixela to Symbicort 160, spacer with instructions have been provided.  They will see if the mist is better tolerated over the powder.  --Switch to albuterol to Xopenex given cardiac side effects  --Referral sent to [place] speech to rule out vocal cord dysfunction  --Advised patient to continue to remain active  --Advised patient to remain up-to-date on all vaccines

## 2024-08-08 NOTE — ASSESSMENT & PLAN NOTE
CT chest in 2016 shows multiple subcentimeter pulmonary nodules, which has been stable since 2015.  Patient is low risk.  --No need for further follow-up.

## 2024-08-14 NOTE — ASSESSMENT & PLAN NOTE
Preventive Care Visit  [facility] AVELINO Chamorro DO, Family Medicine  Aug 14, 2024    Assessment & Plan   12 year old 11 month old, here for preventive care.    Problem List Items Addressed This Visit       Pollen-food allergy, initial encounter     Refill provided          Other Visit Diagnoses       Encounter for routine child health examination w/o abnormal findings    -  Primary    Relevant Orders    BEHAVIORAL/EMOTIONAL ASSESSMENT (63115) (Completed)    PRIMARY CARE FOLLOW-UP SCHEDULING    Allergic to peanuts        Relevant Medications    EPINEPHrine (ANY BX GENERIC EQUIV) 0.3 MG/0.3ML injection 2-pack    Hearing screen passed        Relevant Orders    SCREENING TEST, PURE TONE, AIR ONLY (Completed)    Encounter for vision screening        Relevant Orders    SCREENING, VISUAL ACUITY, QUANTITATIVE, BILAT (Completed)           Patient has been advised of split billing requirements and indicates understanding: Yes  Growth      Normal height and weight    Immunizations   Appropriate vaccinations were ordered.  Patient/Parent(s) declined some/all vaccines today.  HPV declined today. Family will discuss.    Anticipatory Guidance    Reviewed age appropriate anticipatory guidance. This includes body changes with puberty and sexuality, including STIs as appropriate.      Parent/ teen communication    School/ homework    Dental care    Bike/ sport helmets    Body changes with puberty    Cleared for sports:  Yes    Referrals/Ongoing Specialty Care  None  Verbal Dental Referral: Verbal dental referral was given    Dyslipidemia Follow Up:  Discussed nutrition      Shantelle   Oswaldo is presenting for the following:  Well Child          8/14/2024     4:17 PM   Additional Questions   Accompanied by Mother   Questions for today's visit No   Surgery, major illness, or injury since last physical No         8/14/2024   Forms   Any forms needing to be completed Yes            8/14/2024   Social  Reports doing well rare albuterol continues with her serevent and qvar.       Lives with Parent(s)   Recent potential stressors None   Family Hx of mental health challenges No   Lack of transportation has limited access to appts/meds No   Do you have housing? (Housing is defined as stable permanent housing and does not include staying ouside in a car, in a tent, in an abandoned building, in an overnight shelter, or couch-surfing.) Yes   Are you worried about losing your housing? No            8/14/2024     4:07 PM   Health Risks/Safety   Where does your adolescent sit in the car? (!) FRONT SEAT   Does your adolescent always wear a seat belt? Yes   Helmet use? Yes   Do you have guns/firearms in the home? (!) YES   Are the guns/firearms secured in a safe or with a trigger lock? Yes   Is ammunition stored separately from guns? Yes         8/14/2024     4:07 PM   TB Screening   Was your adolescent born outside of the [place]? No         8/14/2024     4:07 PM   TB Screening: Consider immunosuppression as a risk factor for TB   Recent TB infection or positive TB test in family/close contacts No   Recent travel outside [place] (child/family/close contacts) No   Recent residence in high-risk group setting (correctional facility/health care facility/homeless shelter/refugee camp) No            8/14/2024     4:07 PM   Sudden Cardiac Arrest and Sudden Cardiac Death Screening   History of syncope/seizure No   History of exercise-related chest pain or shortness of breath No   FH: premature death (sudden/unexpected or other) attributable to heart diseases No   FH: cardiomyopathy, ion channelopothy, Marfan syndrome, or arrhythmia No         8/14/2024     4:07 PM   Dental Screening   Has your adolescent seen a dentist? Yes   When was the last visit? Within the last 3 months   Has your adolescent had cavities in the last 3 years? (!) YES- 1-2 CAVITIES IN THE LAST 3 YEARS- MODERATE RISK   Has your adolescent s parent(s), caregiver, or sibling(s) had any cavities in the last 2 years?  (!) YES, IN THE LAST  7-23 MONTHS- MODERATE RISK         8/14/2024   Diet   Do you have questions about your adolescent's eating?  No   Do you have questions about your adolescent's height or weight? No   What does your adolescent regularly drink? Water   What type of milk? 1%   What type of water? (!) FILTERED   How often does your family eat meals together? Most days   Servings of fruits/vegetables per day (!) 3-4   At least 3 servings of food or beverages that have calcium each day? Yes   In past 12 months, concerned food might run out No   In past 12 months, food has run out/couldn't afford more No              8/14/2024   Activity   Days per week of moderate/strenuous exercise 6 days   What does your child do for exercise?  soccer swimming pickleball            8/14/2024     4:07 PM   Media Use   Hours per day of screen time (for entertainment) 2   Screen in bedroom No         8/14/2024     4:07 PM   Sleep   Does your adolescent have any trouble with sleep? No   Daytime sleepiness/naps No         8/14/2024     4:07 PM   School   School concerns No concerns   Grade in school 6th Grade   Current school same   School absences (>2 days/mo) No         8/14/2024     4:07 PM   Vision/Hearing   Vision or hearing concerns No concerns         8/14/2024     4:07 PM   Development / Social-Emotional Screen   Developmental concerns No     Psycho-Social/Depression - PSC-17 required for C&TC through age 18  General screening:  Electronic PSC       8/14/2024     4:08 PM   PSC SCORES   Inattentive / Hyperactive Symptoms Subtotal 0   Externalizing Symptoms Subtotal 0   Internalizing Symptoms Subtotal 0   PSC - 17 Total Score 0       Follow up:  PSC-17 PASS (total score <15; attention symptoms <7, externalizing symptoms <7, internalizing symptoms <5)  no follow up necessary  Teen Screen    Teen Screen completed and addressed with patient.      8/14/2024     4:07 PM   [place] [employer] Sports Physical   Do you have any concerns that you would like  to discuss with your provider? No   Has a provider ever denied or restricted your participation in sports for any reason? No   Do you have any ongoing medical issues or recent illness? No   Have you ever passed out or nearly passed out during or after exercise? No   Have you ever had discomfort, pain, tightness, or pressure in your chest during exercise? No   Does your heart ever race, flutter in your chest, or skip beats (irregular beats) during exercise? No   Has a doctor ever told you that you have any heart problems? No   Has a doctor ever requested a test for your heart? For example, electrocardiography (ECG) or echocardiography. No   Do you ever get light-headed or feel shorter of breath than your friends during exercise?  No   Have you ever had a seizure?  No   Has any family member or relative  of heart problems or had an unexpected or unexplained sudden death before age 35 years (including drowning or unexplained car crash)? No   Does anyone in your family have a genetic heart problem such as hypertrophic cardiomyopathy (HCM), Marfan syndrome, arrhythmogenic right ventricular cardiomyopathy (ARVC), long QT syndrome (LQTS), short QT syndrome (SQTS), Brugada syndrome, or catecholaminergic polymorphic ventricular tachycardia (CPVT)?   No   Has anyone in your family had a pacemaker or an implanted defibrillator before age 35? No   Have you ever had a stress fracture or an injury to a bone, muscle, ligament, joint, or tendon that caused you to miss a practice or game? No   Do you have a bone, muscle, ligament, or joint injury that bothers you?  No   Do you cough, wheeze, or have difficulty breathing during or after exercise?   No   Are you missing a kidney, an eye, a testicle (males), your spleen, or any other organ? No   Do you have groin or testicle pain or a painful bulge or hernia in the groin area? No   Do you have any recurring skin rashes or rashes that come and go, including herpes or  "methicillin-resistant Staphylococcus aureus (MRSA)? No   Have you had a concussion or head injury that caused confusion, a prolonged headache, or memory problems? No   Have you ever had numbness, tingling, weakness in your arms or legs, or been unable to move your arms or legs after being hit or falling? No   Have you ever become ill while exercising in the heat? No   Do you or does someone in your family have sickle cell trait or disease? No   Have you ever had, or do you have any problems with your eyes or vision? No   Do you worry about your weight? No   Are you trying to or has anyone recommended that you gain or lose weight? No   Are you on a special diet or do you avoid certain types of foods or food groups? No   Have you ever had an eating disorder? No          Objective     Exam  /69 (BP Location: Left arm, Patient Position: Sitting, Cuff Size: Adult Regular)   Pulse 62   Temp 97.9  F (36.6  C) (Oral)   Resp 16   Ht 1.429 m (4' 8.25\")   Wt 34.7 kg (76 lb 6.4 oz)   SpO2 98%   BMI 16.98 kg/m    21 %ile (Z= -0.81) based on CDC (Boys, 2-20 Years) Stature-for-age data based on Stature recorded on 8/14/2024.  21 %ile (Z= -0.82) based on CDC (Boys, 2-20 Years) weight-for-age data using vitals from 8/14/2024.  36 %ile (Z= -0.37) based on CDC (Boys, 2-20 Years) BMI-for-age based on BMI available as of 8/14/2024.  Blood pressure %cherelle are 86% systolic and 78% diastolic based on the 2017 AAP Clinical Practice Guideline. This reading is in the normal blood pressure range.    Vision Screen  Vision Screen Details  Does the patient have corrective lenses (glasses/contacts)?: No  No Corrective Lenses, PLUS LENS REQUIRED: Pass  Vision Acuity Screen  Vision Acuity Tool: Tong  RIGHT EYE: 10/10 (20/20)  LEFT EYE: 10/10 (20/20)  Is there a two line difference?: No  Vision Screen Results: Pass    Hearing Screen  RIGHT EAR  1000 Hz on Level 40 dB (Conditioning sound): Pass  1000 Hz on Level 20 dB: Pass  2000 Hz on " Level 20 dB: Pass  4000 Hz on Level 20 dB: Pass  6000 Hz on Level 20 dB: Pass  8000 Hz on Level 20 dB: Pass  LEFT EAR  8000 Hz on Level 20 dB: Pass  6000 Hz on Level 20 dB: Pass  4000 Hz on Level 20 dB: Pass  2000 Hz on Level 20 dB: Pass  1000 Hz on Level 20 dB: Pass  500 Hz on Level 25 dB: Pass  RIGHT EAR  500 Hz on Level 25 dB: Pass  Results  Hearing Screen Results: Pass      Physical Exam  GENERAL: Active, alert, in no acute distress.  SKIN: Clear. No significant rash, abnormal pigmentation or lesions  HEAD: Normocephalic  EYES: Pupils equal, round, reactive, Extraocular muscles intact. Normal conjunctivae.  EARS: Normal canals. Tympanic membranes are normal; gray and translucent.  NOSE: Normal without discharge.  MOUTH/THROAT: Clear. No oral lesions. Teeth without obvious abnormalities.  NECK: Supple, no masses.  No thyromegaly.  LYMPH NODES: No adenopathy  LUNGS: Clear. No rales, rhonchi, wheezing or retractions  HEART: Regular rhythm. Normal S1/S2. No murmurs. Normal pulses.  ABDOMEN: Soft, non-tender, not distended, no masses or hepatosplenomegaly. Bowel sounds normal.   NEUROLOGIC: No focal findings. Cranial nerves grossly intact: DTR's normal. Normal gait, strength and tone  BACK: Spine is straight, no scoliosis.  EXTREMITIES: Full range of motion, no deformities  : Exam declined by parent/patient. Reason for decline: Patient/Parental preference     No Marfan stigmata: kyphoscoliosis, high-arched palate, pectus excavatuM, arachnodactyly, arm span > height, hyperlaxity, myopia, MVP, aortic insufficieny)  Eyes: normal fundoscopic and pupils  Cardiovascular: normal PMI, simultaneous femoral/radial pulses, no murmurs (standing, supine, Valsalva)  Skin: no HSV, MRSA, tinea corporis  Musculoskeletal    Neck: normal    Back: normal    Shoulder/arm: normal    Elbow/forearm: normal    Wrist/hand/fingers: normal    Hip/thigh: normal    Knee: normal    Leg/ankle: normal    Foot/toes: normal    Functional (Single Leg  Hop or Squat): normal    Prior to immunization administration, verified patients identity using patient s name and date of birth. Please see Immunization Activity for additional information.     Screening Questionnaire for Pediatric Immunization    Is the child sick today?   No   Does the child have allergies to medications, food, a vaccine component, or latex?   No   Has the child had a serious reaction to a vaccine in the past?   No   Does the child have a long-term health problem with lung, heart, kidney or metabolic disease (e.g., diabetes), asthma, a blood disorder, no spleen, complement component deficiency, a cochlear implant, or a spinal fluid leak?  Is he/she on long-term aspirin therapy?   No   If the child to be vaccinated is 2 through 4 years of age, has a healthcare provider told you that the child had wheezing or asthma in the  past 12 months?   No   If your child is a baby, have you ever been told he or she has had intussusception?   No   Has the child, sibling or parent had a seizure, has the child had brain or other nervous system problems?   No   Does the child have cancer, leukemia, AIDS, or any immune system         problem?   No   Does the child have a parent, brother, or sister with an immune system problem?   No   In the past 3 months, has the child taken medications that affect the immune system such as prednisone, other steroids, or anticancer drugs; drugs for the treatment of rheumatoid arthritis, Crohn s disease, or psoriasis; or had radiation treatments?   No   In the past year, has the child received a transfusion of blood or blood products, or been given immune (gamma) globulin or an antiviral drug?   No   Is the child/teen pregnant or is there a chance that she could become       pregnant during the next month?   No   Has the child received any vaccinations in the past 4 weeks?   No               Immunization questionnaire answers were all negative.      Patient instructed to remain in  clinic for 15 minutes afterwards, and to report any adverse reactions.     Screening performed by Brandon Ascencio Jr., MA on 8/14/2024 at 4:28 PM.    Of note. Patient turns 13 in 12 days. Discussed MyChart access and the privacy rights that will start. He requests that once he is 13, we open full access to his mother and father for MyChart. He will contact me if he wants to pull their access.      Signed Electronically by: Zach Chamorro DO

## 2024-08-23 ENCOUNTER — PATIENT MESSAGE (OUTPATIENT)
Dept: SLEEP MEDICINE | Facility: MEDICAL CENTER | Age: 74
End: 2024-08-23
Payer: MEDICARE

## 2024-08-27 DIAGNOSIS — I47.10 PAROXYSMAL SUPRAVENTRICULAR TACHYCARDIA (HCC): ICD-10-CM

## 2024-08-27 DIAGNOSIS — I48.0 PAROXYSMAL ATRIAL FIBRILLATION (HCC): ICD-10-CM

## 2024-08-27 DIAGNOSIS — E78.5 DYSLIPIDEMIA: ICD-10-CM

## 2024-08-27 RX ORDER — FLECAINIDE ACETATE 50 MG/1
50 TABLET ORAL 2 TIMES DAILY
Qty: 180 TABLET | Refills: 0 | Status: SHIPPED | OUTPATIENT
Start: 2024-08-27 | End: 2024-08-30

## 2024-08-27 RX ORDER — ATORVASTATIN CALCIUM 80 MG/1
80 TABLET, FILM COATED ORAL NIGHTLY
Qty: 80 TABLET | Refills: 2 | Status: SHIPPED | OUTPATIENT
Start: 2024-08-27

## 2024-08-27 NOTE — TELEPHONE ENCOUNTER
Schedulers- can you please contact pt to schedule a Non-Provider for an EKG per flecainide protocol. Last EKG 3/15/24, need to have one every 6 months. Thank you!

## 2024-08-30 ENCOUNTER — NON-PROVIDER VISIT (OUTPATIENT)
Dept: CARDIOLOGY | Facility: MEDICAL CENTER | Age: 74
End: 2024-08-30
Attending: INTERNAL MEDICINE
Payer: MEDICARE

## 2024-08-30 ENCOUNTER — TELEPHONE (OUTPATIENT)
Dept: CARDIOLOGY | Facility: MEDICAL CENTER | Age: 74
End: 2024-08-30

## 2024-08-30 ENCOUNTER — DOCUMENTATION (OUTPATIENT)
Dept: HEALTH INFORMATION MANAGEMENT | Facility: OTHER | Age: 74
End: 2024-08-30

## 2024-08-30 DIAGNOSIS — I48.0 PAROXYSMAL ATRIAL FIBRILLATION (HCC): ICD-10-CM

## 2024-08-30 DIAGNOSIS — I44.7 LBBB (LEFT BUNDLE BRANCH BLOCK): ICD-10-CM

## 2024-08-30 LAB — EKG IMPRESSION: NORMAL

## 2024-08-30 PROCEDURE — 93005 ELECTROCARDIOGRAM TRACING: CPT

## 2024-08-30 NOTE — TELEPHONE ENCOUNTER
EKG performed. New left bundle branch block noted compared to 03/15/24. Her EKG of 05/15/23 showed left bundle branch block as well Please have the patient hold flecainide. Please schedule consult with electrophysiology service with Stefan Beavers.  Thank you.  YARED

## 2024-08-30 NOTE — PROGRESS NOTES
Patient was here today for EKG. EKG performed and transferred into patients chart. Paper copy of EKG given to Benjamin. RN will contact patient to follow up unless patient is symptomatic.   Is patient reporting any symptoms? No     Patient came in for EKG RENU COLE was able to review and give me the okay for patient to leave. Patient did not had any questions or concerns no symptoms.

## 2024-08-30 NOTE — TELEPHONE ENCOUNTER
Schedulers: Please schedule pt with MT per JI    Phone Number Called: [phone]    Call outcome: Spoke to patient regarding message below.    Message: Called to discuss JIs recommendations. She was aware she had LBBB in past. Discussed that it appeared to resolve on March 2024 EKG. She will monitor her HR for bradycardia and hold flecainide. Referral to EP placed, pt verbalized understanding

## 2024-08-30 NOTE — PROGRESS NOTES
JI: Pt came in for a non-provider EKG per flecainide protocol. Tracings read by KELSEY, noted ND elongation. Please advise. Thank you!

## 2024-09-09 ENCOUNTER — OFFICE VISIT (OUTPATIENT)
Dept: CARDIOLOGY | Facility: MEDICAL CENTER | Age: 74
End: 2024-09-09
Attending: INTERNAL MEDICINE
Payer: MEDICARE

## 2024-09-09 ENCOUNTER — TELEPHONE (OUTPATIENT)
Dept: CARDIOLOGY | Facility: MEDICAL CENTER | Age: 74
End: 2024-09-09

## 2024-09-09 VITALS
BODY MASS INDEX: 27.52 KG/M2 | HEART RATE: 66 BPM | HEIGHT: 60 IN | RESPIRATION RATE: 16 BRPM | DIASTOLIC BLOOD PRESSURE: 82 MMHG | WEIGHT: 140.2 LBS | OXYGEN SATURATION: 97 % | SYSTOLIC BLOOD PRESSURE: 158 MMHG

## 2024-09-09 DIAGNOSIS — I25.10 CORONARY ARTERY DISEASE DUE TO LIPID RICH PLAQUE: Primary | ICD-10-CM

## 2024-09-09 DIAGNOSIS — I44.7 LBBB (LEFT BUNDLE BRANCH BLOCK): ICD-10-CM

## 2024-09-09 DIAGNOSIS — I10 ESSENTIAL HYPERTENSION: ICD-10-CM

## 2024-09-09 DIAGNOSIS — I47.10 PAROXYSMAL SUPRAVENTRICULAR TACHYCARDIA (HCC): ICD-10-CM

## 2024-09-09 DIAGNOSIS — I25.83 CORONARY ARTERY DISEASE DUE TO LIPID RICH PLAQUE: Primary | ICD-10-CM

## 2024-09-09 DIAGNOSIS — I48.0 PAROXYSMAL ATRIAL FIBRILLATION (HCC): ICD-10-CM

## 2024-09-09 LAB — EKG IMPRESSION: NORMAL

## 2024-09-09 PROCEDURE — 3077F SYST BP >= 140 MM HG: CPT | Performed by: NURSE PRACTITIONER

## 2024-09-09 PROCEDURE — 93010 ELECTROCARDIOGRAM REPORT: CPT | Performed by: INTERNAL MEDICINE

## 2024-09-09 PROCEDURE — 99212 OFFICE O/P EST SF 10 MIN: CPT | Performed by: NURSE PRACTITIONER

## 2024-09-09 PROCEDURE — 3079F DIAST BP 80-89 MM HG: CPT | Performed by: NURSE PRACTITIONER

## 2024-09-09 PROCEDURE — 93005 ELECTROCARDIOGRAM TRACING: CPT | Performed by: NURSE PRACTITIONER

## 2024-09-09 PROCEDURE — 99214 OFFICE O/P EST MOD 30 MIN: CPT | Performed by: NURSE PRACTITIONER

## 2024-09-09 ASSESSMENT — ENCOUNTER SYMPTOMS
NERVOUS/ANXIOUS: 0
CONSTITUTIONAL NEGATIVE: 1
NAUSEA: 0
SENSORY CHANGE: 0
EYES NEGATIVE: 1
WHEEZING: 0
DIZZINESS: 0
DEPRESSION: 0
MUSCULOSKELETAL NEGATIVE: 1
PND: 0
NEUROLOGICAL NEGATIVE: 1
CLAUDICATION: 0
BRUISES/BLEEDS EASILY: 0
ORTHOPNEA: 0
SHORTNESS OF BREATH: 0
RESPIRATORY NEGATIVE: 1
PALPITATIONS: 0
GASTROINTESTINAL NEGATIVE: 1
HALLUCINATIONS: 0

## 2024-09-09 NOTE — TELEPHONE ENCOUNTER
MT  Caller: Ezio Mora    Topic/issue: Patient needs to have her lab orders mailed. Patient spoke with MT about two labs, one of which is a lipid profile. She cannot remember the other one but she wants to complete both. Please mail these to patient.     Address:   [address] [place] [id number]     Callback Number: [phone]    Thank you,  Nettie SOTO

## 2024-09-09 NOTE — ASSESSMENT & PLAN NOTE
- stressful drive to appt   - home pressure much better    - possibly some white coat hypertension as well

## 2024-09-09 NOTE — ASSESSMENT & PLAN NOTE
- Holter for 2 weeks   - echocardiogram for functional and valvular assessment   - continue Eliquis for stroke protection   - metoprolol for rate control

## 2024-09-09 NOTE — PROGRESS NOTES
Atrial Fibrillation Clinic New Consult Note    DOS: 9/9/2024  [id number]  Ezio Mora    Chief complaint/Reason for consult: atrial fibrillation and new LBBB    HPI: Pt is a 73 y.o. female who presents to the clinic today in consultation for atrial fibrillation and new finding of LBBB. Patient has a past medical history significant for but not limited to: moderate persistent asthma, hypertension, paroxysmal atrial fibrillation, h/o CVA, chronic anticoagulation, LBBB, CAD from lipid plaques. Patient here after new LBBB seen on EKG by primary cardiologist. Patient does not have LBBB on EKG today. Multiple reasons for possible LBBB and patient has not had any cardiac workup for some time. WE discuss testing to determine what would be best treatment plan. She has a diagnosis of CAD and was stopped on flecainide. She may need to consider sotalol or Tikosyn for better long term antiarrhythmic medication. Patient stated she has been told she had some mitral disease in past and this may be horse driving the cart. Will reconvene post testing tod determine best treatment strategy. Patient currently on Eliquis for stroke protection and metoprolol for rate control.       Past Medical History:   Diagnosis Date    ASTHMA     Atrial fibrillation (HCC)     Chronic fatigue 09/28/2016    Degenerative arthritis of spine     Hyperlipidemia     Hypertension     Mild intermittent asthma with acute exacerbation 06/18/2015    Post-menopause on HRT (hormone replacement therapy)     Pulmonary nodule 06/28/2016    Pulmonary, stable CT 8/15    Rheumatic fever     SVT (supraventricular tachycardia) (HCC)     Weight gain 06/28/2016       Past Surgical History:   Procedure Laterality Date    FUSION, SPINE, LUMBAR, PLIF      PRIMARY C SECTION      TUBAL LIGATION         Social History     Socioeconomic History    Marital status:      Spouse name: Not on file    Number of children: Not on file    Years of education: Not on file     Highest education level: Not on file   Occupational History    Not on file   Tobacco Use    Smoking status: Never    Smokeless tobacco: Never   Vaping Use    Vaping status: Never Used   Substance and Sexual Activity    Alcohol use: Yes     Alcohol/week: 0.6 - 1.2 oz     Types: 1 - 2 Glasses of wine per week     Comment: occ wine    Drug use: Never    Sexual activity: Not on file   Other Topics Concern    Not on file   Social History Narrative         Social Determinants of Health     Financial Resource Strain: Not on file   Food Insecurity: Not on file   Transportation Needs: Not on file   Physical Activity: Not on file   Stress: Not on file   Social Connections: Not on file   Intimate Partner Violence: Not on file   Housing Stability: Not on file       Family History   Problem Relation Age of Onset    Other Mother         global decline due to age    Heart Disease Father     Thyroid Child        Allergies   Allergen Reactions    Lisinopril Unspecified    Terramycin [Oxytetracycline]      Rash as a child        Current Outpatient Medications   Medication Sig Dispense Refill    atorvastatin (LIPITOR) 80 MG tablet TAKE 1 TABLET BY MOUTH EVERY EVENING 80 Tablet 2    budesonide-formoterol (SYMBICORT) 160-4.5 MCG/ACT Aerosol Inhale 2 Puffs 2 times a day. Use spacer. Rinse mouth after each use. 30.6 g 3    Spacer/Aero-Holding Chambers Device 2 Puffs 2 times a day. Use with Symbicort and Xopenex. 1 Each 11    albuterol (PROAIR HFA) 108 (90 Base) MCG/ACT Aero Soln inhalation aerosol Inhale 2 Puffs every 6 hours as needed for Shortness of Breath. 8.5 g 3    montelukast (SINGULAIR) 10 MG Tab Take 1 Tablet by mouth every day. 90 Tablet 3    ELIQUIS 5 MG Tab TAKE 1 TABLET BY MOUTH TWICE DAILY 180 Tablet 0    metoprolol tartrate (LOPRESSOR) 25 MG Tab Take 1 Tablet by mouth 2 times a day. 200 Tablet 3    losartan (COZAAR) 100 MG Tab Take 1 Tablet by mouth every day. 90 Tablet 3    traZODone (DESYREL) 50 MG Tab TAKE 1  TABLET BY MOUTH EVERY EVENING 90 Tablet 3    Magnesium Citrate 100 MG Tab Take  by mouth every day.      Calcium Carbonate 1500 (600 Ca) MG Tab Take  by mouth every day.      vitamin D (VITAMIND D3) 1000 UNIT Tab Take 1 Tablet by mouth every day.       No current facility-administered medications for this visit.       Vitals:    09/09/24 0940   BP: (!) 158/82   Pulse: 66   Resp: 16   SpO2: 97%         Review of Systems   Constitutional: Negative.  Negative for malaise/fatigue.   HENT: Negative.     Eyes: Negative.    Respiratory: Negative.  Negative for shortness of breath and wheezing.    Cardiovascular:  Negative for chest pain, palpitations, orthopnea, claudication, leg swelling and PND.   Gastrointestinal: Negative.  Negative for nausea.   Genitourinary: Negative.    Musculoskeletal: Negative.    Skin: Negative.    Neurological: Negative.  Negative for dizziness and sensory change.   Endo/Heme/Allergies: Negative.  Does not bruise/bleed easily.   Psychiatric/Behavioral:  Negative for depression and hallucinations. The patient is not nervous/anxious.         Prior echo results reviewed: ordered    Prior cath/stress results reviewed: ordered    EKG interpreted by me: Sinus    Physical Exam  Constitutional:       Appearance: Normal appearance.   HENT:      Head: Normocephalic.   Eyes:      Pupils: Pupils are equal, round, and reactive to light.   Neck:      Vascular: No JVD.   Cardiovascular:      Rate and Rhythm: Normal rate and regular rhythm.      Pulses: Normal pulses.      Heart sounds: Normal heart sounds.   Pulmonary:      Effort: Pulmonary effort is normal.      Breath sounds: Normal breath sounds.   Abdominal:      General: Abdomen is flat.      Palpations: Abdomen is soft.   Musculoskeletal:      Cervical back: Normal range of motion.      Right lower leg: No edema.      Left lower leg: No edema.   Skin:     General: Skin is warm and dry.   Neurological:      Mental Status: She is alert and oriented to  "person, place, and time.   Psychiatric:         Mood and Affect: Mood normal.         Behavior: Behavior normal.          Data:  Lipids:   Lab Results   Component Value Date/Time    CHOLSTRLTOT 174 06/19/2024 12:00 AM    TRIGLYCERIDE 57 06/19/2024 12:00 AM    HDL 95 06/19/2024 12:00 AM    LDL 66 06/19/2024 12:00 AM        BMP:  Lab Results   Component Value Date/Time    SODIUM 139 08/30/2022 0309    POTASSIUM 4.0 08/30/2022 0309    CHLORIDE 105 08/30/2022 0309    CO2 23 08/30/2022 0309    GLUCOSE 100 (H) 08/30/2022 0309    BUN 11 08/30/2022 0309    CREATININE 0.60 08/30/2022 0309    CALCIUM 9.2 08/30/2022 0309    ANION 11.0 08/30/2022 0309       GFR:  Lab Results   Component Value Date/Time    IFAFRICA >60 11/18/2019 0807    IFNOTAFR >60 11/18/2019 0807        TSH:   Lab Results   Component Value Date/Time    TSHULTRASEN 2.220 11/18/2019 0807       MAGNESIUM:  Lab Results   Component Value Date/Time    MAGNESIUM 2.1 08/30/2022 0309        THYROXINE (T4):   No results found for: \"FREEDIR\"     CBC:   Lab Results   Component Value Date/Time    WBC 6.7 08/30/2022 03:09 AM    RBC 4.38 08/30/2022 03:09 AM    HEMOGLOBIN 14.2 08/30/2022 03:09 AM    HEMATOCRIT 42.0 08/30/2022 03:09 AM    MCV 95.9 08/30/2022 03:09 AM    MCH 32.4 08/30/2022 03:09 AM    MCHC 33.8 08/30/2022 03:09 AM    RDW 46.5 08/30/2022 03:09 AM    PLATELETCT 324 08/30/2022 03:09 AM    MPV 8.9 (L) 08/30/2022 03:09 AM    NEUTSPOLYS 55.90 08/30/2022 03:09 AM    LYMPHOCYTES 26.70 08/30/2022 03:09 AM    MONOCYTES 9.10 08/30/2022 03:09 AM    EOSINOPHILS 7.70 (H) 08/30/2022 03:09 AM    BASOPHILS 0.30 08/30/2022 03:09 AM    IMMGRAN 0.30 08/30/2022 03:09 AM    NRBC 0.00 08/30/2022 03:09 AM    NEUTS 3.76 08/30/2022 03:09 AM    LYMPHS 1.80 08/30/2022 03:09 AM    MONOS 0.61 08/30/2022 03:09 AM    EOS 0.52 (H) 08/30/2022 03:09 AM    BASO 0.02 08/30/2022 03:09 AM    IMMGRANAB 0.02 08/30/2022 03:09 AM    NRBCAB 0.00 08/30/2022 03:09 AM        CBC w/o DIFF  Lab Results " "  Component Value Date/Time    WBC 6.7 08/30/2022 03:09 AM    RBC 4.38 08/30/2022 03:09 AM    HEMOGLOBIN 14.2 08/30/2022 03:09 AM    MCV 95.9 08/30/2022 03:09 AM    MCH 32.4 08/30/2022 03:09 AM    MCHC 33.8 08/30/2022 03:09 AM    RDW 46.5 08/30/2022 03:09 AM    MPV 8.9 (L) 08/30/2022 03:09 AM       LIVER:  Lab Results   Component Value Date/Time    ALKPHOSPHAT 67 08/30/2022 03:09 AM    ASTSGOT 14 08/30/2022 03:09 AM    ALTSGPT 14 08/30/2022 03:09 AM    TBILIRUBIN 0.5 08/30/2022 03:09 AM       BNP:  No results found for: \"BNPBTYPENAT\"    PT/INR:  No results found for: \"PROTHROMBTM\", \"INR\"          Impression/Plan:  Essential hypertension   - stressful drive to appt   - home pressure much better    - possibly some white coat hypertension as well     Paroxysmal atrial fibrillation (HCC)   - Holter for 2 weeks   - echocardiogram for functional and valvular assessment   - continue Eliquis for stroke protection   - metoprolol for rate control     LBBB (left bundle branch block)   - stress test for possible ischemia              A total of 32 minutes of time was spent on day of encounter reviewing medical record, performing history and examination, counseling, ordering medication/test/consults, collaborating with referring service, and documentation.    Stefan Tucker [facility]-EP  Cardiac Electrophysiology    "

## 2024-09-11 DIAGNOSIS — Q38.3 TONGUE ABNORMALITY: ICD-10-CM

## 2024-09-24 LAB
CHOLEST SERPL-MCNC: 176 MG/DL
HDLC SERPL-MCNC: 99 MG/DL
LDLC SERPL CALC-MCNC: 65 MG/DL
TRIGL SERPL-MCNC: 52 MG/DL

## 2024-09-26 DIAGNOSIS — I48.0 PAROXYSMAL ATRIAL FIBRILLATION (HCC): ICD-10-CM

## 2024-10-31 ENCOUNTER — APPOINTMENT (OUTPATIENT)
Dept: RADIOLOGY | Facility: MEDICAL CENTER | Age: 74
End: 2024-10-31
Attending: NURSE PRACTITIONER
Payer: MEDICARE

## 2024-10-31 ENCOUNTER — APPOINTMENT (OUTPATIENT)
Dept: CARDIOLOGY | Facility: MEDICAL CENTER | Age: 74
End: 2024-10-31
Attending: NURSE PRACTITIONER
Payer: MEDICARE

## 2024-11-19 ENCOUNTER — NON-PROVIDER VISIT (OUTPATIENT)
Dept: CARDIOLOGY | Facility: MEDICAL CENTER | Age: 74
End: 2024-11-19
Attending: NURSE PRACTITIONER
Payer: MEDICARE

## 2024-11-19 NOTE — PROGRESS NOTES
Home enrollment completed in the 14 day o Holter monitor per RENU Masterson. Monitor will be shipped to patient via [employer], pending EOS.

## 2024-11-26 ENCOUNTER — TELEPHONE (OUTPATIENT)
Dept: CARDIOLOGY | Facility: MEDICAL CENTER | Age: 74
End: 2024-11-26
Payer: MEDICARE

## 2024-11-26 NOTE — TELEPHONE ENCOUNTER
Ashtyn  You; Andreas  Med Ass't; Gloria RODRIGUEZ , Med Ass't5 minutes ago (11:16 AM)     S/w patient advised it is okay for xray as long as it is not her chest. Thank you.

## 2024-11-26 NOTE — TELEPHONE ENCOUNTER
MT    Caller: Ezio Mora     Topic/issue: Pt called in regards to have an appt at the [facility] today they are doing shots in her hips and an xray pt wants to make sure she can do these things while having the Cardiac event zio monitor on please advise.     Callback Number: [phone]    Thank you,    Andreas KESSLER

## 2024-12-05 ENCOUNTER — APPOINTMENT (OUTPATIENT)
Dept: CARDIOLOGY | Facility: MEDICAL CENTER | Age: 74
End: 2024-12-05
Attending: NURSE PRACTITIONER
Payer: MEDICARE

## 2024-12-06 ENCOUNTER — OFFICE VISIT (OUTPATIENT)
Dept: SLEEP MEDICINE | Facility: MEDICAL CENTER | Age: 74
End: 2024-12-06
Attending: INTERNAL MEDICINE
Payer: MEDICARE

## 2024-12-06 VITALS
WEIGHT: 139 LBS | OXYGEN SATURATION: 97 % | HEIGHT: 60 IN | BODY MASS INDEX: 27.29 KG/M2 | DIASTOLIC BLOOD PRESSURE: 64 MMHG | HEART RATE: 60 BPM | SYSTOLIC BLOOD PRESSURE: 128 MMHG

## 2024-12-06 DIAGNOSIS — J45.40 MODERATE PERSISTENT ASTHMA WITHOUT COMPLICATION: ICD-10-CM

## 2024-12-06 DIAGNOSIS — F45.8: ICD-10-CM

## 2024-12-06 DIAGNOSIS — Z71.85 VACCINE COUNSELING: ICD-10-CM

## 2024-12-06 PROCEDURE — 99213 OFFICE O/P EST LOW 20 MIN: CPT | Performed by: INTERNAL MEDICINE

## 2024-12-06 PROCEDURE — 3074F SYST BP LT 130 MM HG: CPT | Performed by: INTERNAL MEDICINE

## 2024-12-06 PROCEDURE — 99215 OFFICE O/P EST HI 40 MIN: CPT | Performed by: INTERNAL MEDICINE

## 2024-12-06 PROCEDURE — 3078F DIAST BP <80 MM HG: CPT | Performed by: INTERNAL MEDICINE

## 2024-12-06 NOTE — PROGRESS NOTES
"Pulmonary Clinic Note    Date of Visit: 8/8/2024     Chief Complaint:  Chief Complaint   Patient presents with    Follow-Up       Last seen 08/08/2024 w/ Nettie SEARS  Dx: Asthma, Pulmonary nodule, Environmental and seasonal allergies       HPI:   Ezio Mora is a very pleasant 73 y.o. year old female never smoker, with a PMHx of asthma, A-fib on Eliquis, history of CVA, LBBB, pulmonary nodule, allergic rhinitis.    Since last clinic visit:   0 ED or Urgent Care visits in the past year for respiratory issues.  Has not been hospitalized for respiratory issues.   Recent pulmonary medication changes: n/a  Recent Antibiotics/prednisone: No  There are no new ROS complaints today.   She did 3 in office treatments for VCD and now does home exercises. Feeling better.   She had a nodule on her vocal cord. Still awaiting ENT.     Interval events:   \"8/8/2024-  Patient states that she had a severe cough for 4 months that was dry in nature.  She could not find a triggering cause for the cough and the cough resolved on its own.  She states after the cough resolved she found that her voice was hoarse.  She did discontinue Wixela for a short amount of time, which she said slightly helped with the hoarse voice, but she had a increase in her shortness of breath, so she resumed Wixela.  She continues to use Singulair.  She rarely uses albuterol due to having tachycardia after use.  She has not had any exacerbations.  She denies any shortness of breath or wheezing.  She states she does have allergies, but is reluctant to take any other OTC antihistamines due to her blood pressure and A-fib.\"        Past Medical History:   Diagnosis Date    ASTHMA     Atrial fibrillation (HCC)     Chronic fatigue 09/28/2016    Degenerative arthritis of spine     Hyperlipidemia     Hypertension     Mild intermittent asthma with acute exacerbation 06/18/2015    Post-menopause on HRT (hormone replacement therapy)     Pulmonary nodule " 06/28/2016    Pulmonary, stable CT 8/15    Rheumatic fever     SVT (supraventricular tachycardia) (HCC)     Weight gain 06/28/2016     Past Surgical History:   Procedure Laterality Date    FUSION, SPINE, LUMBAR, PLIF      PRIMARY C SECTION      TUBAL LIGATION       Social History     Socioeconomic History    Marital status:      Spouse name: Not on file    Number of children: Not on file    Years of education: Not on file    Highest education level: Not on file   Occupational History    Not on file   Tobacco Use    Smoking status: Never    Smokeless tobacco: Never   Vaping Use    Vaping status: Never Used   Substance and Sexual Activity    Alcohol use: Yes     Alcohol/week: 0.6 - 1.2 oz     Types: 1 - 2 Glasses of wine per week     Comment: occ wine    Drug use: Never    Sexual activity: Not on file   Other Topics Concern    Not on file   Social History Narrative         Social Drivers of Health     Financial Resource Strain: Not on file   Food Insecurity: Not on file   Transportation Needs: Not on file   Physical Activity: Not on file   Stress: Not on file   Social Connections: Not on file   Intimate Partner Violence: Not on file   Housing Stability: Not on file        Family History   Problem Relation Age of Onset    Other Mother         global decline due to age    Heart Disease Father     Thyroid Child      Current Outpatient Medications on File Prior to Visit   Medication Sig Dispense Refill    atorvastatin (LIPITOR) 80 MG tablet TAKE 1 TABLET BY MOUTH EVERY EVENING 80 Tablet 2    budesonide-formoterol (SYMBICORT) 160-4.5 MCG/ACT Aerosol Inhale 2 Puffs 2 times a day. Use spacer. Rinse mouth after each use. 30.6 g 3    Spacer/Aero-Holding Chambers Device 2 Puffs 2 times a day. Use with Symbicort and Xopenex. 1 Each 11    albuterol (PROAIR HFA) 108 (90 Base) MCG/ACT Aero Soln inhalation aerosol Inhale 2 Puffs every 6 hours as needed for Shortness of Breath. 8.5 g 3    montelukast (SINGULAIR) 10 MG  Tab Take 1 Tablet by mouth every day. 90 Tablet 3    ELIQUIS 5 MG Tab TAKE 1 TABLET BY MOUTH TWICE DAILY 180 Tablet 0    metoprolol tartrate (LOPRESSOR) 25 MG Tab Take 1 Tablet by mouth 2 times a day. 200 Tablet 3    losartan (COZAAR) 100 MG Tab Take 1 Tablet by mouth every day. 90 Tablet 3    traZODone (DESYREL) 50 MG Tab TAKE 1 TABLET BY MOUTH EVERY EVENING 90 Tablet 3    Magnesium Citrate 100 MG Tab Take  by mouth every day.      Calcium Carbonate 1500 (600 Ca) MG Tab Take  by mouth every day.      vitamin D (VITAMIND D3) 1000 UNIT Tab Take 1 Tablet by mouth every day.       No current facility-administered medications on file prior to visit.     Allergies: Lisinopril and Terramycin [oxytetracycline]    ROS:   Negative except as noted in HPI.    Vitals:  /64 (BP Location: Left arm, Patient Position: Sitting, BP Cuff Size: Adult)   Pulse 60   Ht 1.524 m (5')   Wt 63 kg (139 lb)   SpO2 97%     Physical Exam  Vitals reviewed.   Constitutional:       General: She is not in acute distress.     Appearance: Normal appearance. She is not ill-appearing, toxic-appearing or diaphoretic.   HENT:      Mouth/Throat:      Pharynx: Oropharynx is clear.   Eyes:      General:         Right eye: No discharge.         Left eye: No discharge.      Conjunctiva/sclera: Conjunctivae normal.      Pupils: Pupils are equal, round, and reactive to light.   Cardiovascular:      Rate and Rhythm: Normal rate and regular rhythm.      Heart sounds: No murmur heard.     No friction rub. No gallop.   Pulmonary:      Effort: No respiratory distress.      Breath sounds: Normal breath sounds. No stridor. No wheezing, rhonchi or rales.   Musculoskeletal:         General: No swelling.      Right lower leg: No edema.      Left lower leg: No edema.   Skin:     General: Skin is warm.   Neurological:      General: No focal deficit present.      Mental Status: She is alert and oriented to person, place, and time.   Psychiatric:         Mood and  Affect: Mood normal.         Behavior: Behavior normal.         Thought Content: Thought content normal.         Judgment: Judgment normal.         Laboratory Data:  PFTs: (Date: 2/6/2021)-          Immunization History   Administered Date(s) Administered    Covid-19 Mrna (Spikevax) Moderna 12+ Years 10/04/2023, 10/10/2024    Influenza Seasonal Injectable - Historical Data 10/18/2011, 10/06/2014, 10/06/2017    Influenza Vaccine Adult HD 10/11/2018, 09/27/2019, 11/08/2021    Influenza Vaccine Quad Inj (Pf) 10/07/2020    Influenza split virus trivalent (PF) 10/14/2010, 10/24/2012, 10/22/2015, 09/09/2016    Influenza, unspecified formulation 10/10/2022    MODERNA SARS-COV-2 VACCINE (12+) 01/12/2021, 02/09/2021, 09/01/2021, 05/23/2022    Pneumococcal Conjugate Vaccine (PCV20) 08/08/2024    Pneumococcal polysaccharide vaccine (PPSV-23) 02/10/2020    Tdap Vaccine 11/02/2012, 11/12/2018, 06/04/2024         Impression:  1.  Spirometry is normal.  2.  There is no significant change postbronchodilator.  3.  The flow volume loop is consistent with the spirometry data.  4.  Lung volumes are normal.  5.  Diffusion capacity is normal.  6.  In comparison to the prior study from 08/27/2020, the FEV1 has improved from 1.88 to 1.94.    CT Chest: (Date: 10/6/2016)-  Impression:  Multiple stable pulmonary nodules, the largest width measuring 5 mm in the right lower lobe.  One tiny 2.4 mm right middle lobe nodule is new.  Atherosclerotic plaque.      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  Referral was placed in September.  Told her that her referral is still good and that she just needs to schedule her appointment.  She is also due for an RSV vaccine, which she plans to get.          This note was generated  using voice recognition software which has a chance of producing errors of grammar and possibly content.  I have made every reasonable attempt to find and correct any obvious errors, but it should be expected that some may not be found prior to finalization of this note.    Time spent in record review prior to patient arrival, reviewing results, and in face-to-face encounter totaled 40 min.  __________  Aldo Jones DO, [facility]  Staff Pulmonologist and Intensivist  [facility]     Please note that this dictation was created using voice recognition software. The accuracy of the dictation is limited to the abilities of the software. I have made every reasonable attempt to correct obvious errors, but I expect that there are errors of grammar and possibly content that I did not discover before finalizing the note.

## 2024-12-11 DIAGNOSIS — J45.20 MILD INTERMITTENT ASTHMA WITHOUT COMPLICATION: ICD-10-CM

## 2024-12-13 ENCOUNTER — TELEPHONE (OUTPATIENT)
Dept: CARDIOLOGY | Facility: MEDICAL CENTER | Age: 74
End: 2024-12-13
Payer: MEDICARE

## 2024-12-13 RX ORDER — ALBUTEROL SULFATE 90 UG/1
2 INHALANT RESPIRATORY (INHALATION) EVERY 6 HOURS PRN
Qty: 8.5 G | Refills: 0 | Status: SHIPPED | OUTPATIENT
Start: 2024-12-13

## 2024-12-13 NOTE — TELEPHONE ENCOUNTER
[facility] EOS to MT's nurse, Mary, on 12/13/2024    OF NOTE:  SVT wasdetected within +/- 45 seconds of symptomatic patient events    Preliminary findings:    211 episodes of SVT  with an avg rate of 113 bpm    Sinus Rhythm  with an avg rate of 68 bpm  First Degree AV Block  BBB/IVCD    Supraventricular ectopics were rare    Isolated ventricular ectopics were rare    5 recorded patient events associated with:  SVT   SR   SVE(s)

## 2025-01-13 ENCOUNTER — TELEPHONE (OUTPATIENT)
Dept: CARDIOLOGY | Facility: MEDICAL CENTER | Age: 75
End: 2025-01-13
Payer: MEDICARE

## 2025-01-13 NOTE — TELEPHONE ENCOUNTER
MT    Caller: Ezio Mora     Topic/issue: The patient would like a call to discuss the results of her ZIO patch that was sent on 11/19/24.    She also received a message from [employer] telling her that a second device was ordered 1/7/25 (there seems to be no order in Epic).     Please advise.     Callback Number: [phone]     Thank you,  Kain ALMENDAREZ

## 2025-01-27 DIAGNOSIS — F51.04 CHRONIC INSOMNIA: ICD-10-CM

## 2025-01-27 RX ORDER — TRAZODONE HYDROCHLORIDE 50 MG/1
50 TABLET, FILM COATED ORAL NIGHTLY
Qty: 90 TABLET | Refills: 0 | Status: SHIPPED | OUTPATIENT
Start: 2025-01-27

## 2025-01-28 ENCOUNTER — HOSPITAL ENCOUNTER (OUTPATIENT)
Dept: RADIOLOGY | Facility: MEDICAL CENTER | Age: 75
End: 2025-01-28
Attending: NURSE PRACTITIONER
Payer: MEDICARE

## 2025-01-28 ENCOUNTER — HOSPITAL ENCOUNTER (OUTPATIENT)
Dept: CARDIOLOGY | Facility: MEDICAL CENTER | Age: 75
End: 2025-01-28
Attending: NURSE PRACTITIONER
Payer: MEDICARE

## 2025-01-28 DIAGNOSIS — I25.10 CORONARY ARTERY DISEASE DUE TO LIPID RICH PLAQUE: ICD-10-CM

## 2025-01-28 DIAGNOSIS — I44.7 LBBB (LEFT BUNDLE BRANCH BLOCK): ICD-10-CM

## 2025-01-28 DIAGNOSIS — I48.0 PAROXYSMAL ATRIAL FIBRILLATION (HCC): ICD-10-CM

## 2025-01-28 DIAGNOSIS — I25.83 CORONARY ARTERY DISEASE DUE TO LIPID RICH PLAQUE: ICD-10-CM

## 2025-01-28 PROCEDURE — 93018 CV STRESS TEST I&R ONLY: CPT | Performed by: INTERNAL MEDICINE

## 2025-01-28 PROCEDURE — A9502 TC99M TETROFOSMIN: HCPCS

## 2025-01-28 PROCEDURE — 93306 TTE W/DOPPLER COMPLETE: CPT

## 2025-01-28 PROCEDURE — 700111 HCHG RX REV CODE 636 W/ 250 OVERRIDE (IP)

## 2025-01-28 PROCEDURE — 78452 HT MUSCLE IMAGE SPECT MULT: CPT | Mod: 26 | Performed by: INTERNAL MEDICINE

## 2025-01-28 RX ORDER — AMINOPHYLLINE 25 MG/ML
100 INJECTION, SOLUTION INTRAVENOUS
Status: DISCONTINUED | OUTPATIENT
Start: 2025-01-28 | End: 2025-01-29 | Stop reason: HOSPADM

## 2025-01-28 RX ORDER — REGADENOSON 0.08 MG/ML
0.4 INJECTION, SOLUTION INTRAVENOUS ONCE
Status: COMPLETED | OUTPATIENT
Start: 2025-01-28 | End: 2025-01-28

## 2025-01-28 RX ORDER — REGADENOSON 0.08 MG/ML
INJECTION, SOLUTION INTRAVENOUS
Status: COMPLETED
Start: 2025-01-28 | End: 2025-01-28

## 2025-01-28 RX ADMIN — REGADENOSON 0.4 MG: 0.08 INJECTION, SOLUTION INTRAVENOUS at 14:59

## 2025-01-29 LAB
LV EJECT FRACT MOD 2C 99903: 67.8
LV EJECT FRACT MOD 4C 99902: 67.99
LV EJECT FRACT MOD BP 99901: 68.9

## 2025-01-29 PROCEDURE — 93306 TTE W/DOPPLER COMPLETE: CPT | Mod: 26 | Performed by: INTERNAL MEDICINE

## 2025-02-10 ENCOUNTER — PHARMACY VISIT (OUTPATIENT)
Dept: PHARMACY | Facility: MEDICAL CENTER | Age: 75
End: 2025-02-10
Payer: COMMERCIAL

## 2025-02-10 ENCOUNTER — TELEPHONE (OUTPATIENT)
Dept: CARDIOLOGY | Facility: MEDICAL CENTER | Age: 75
End: 2025-02-10

## 2025-02-10 ENCOUNTER — OFFICE VISIT (OUTPATIENT)
Dept: CARDIOLOGY | Facility: MEDICAL CENTER | Age: 75
End: 2025-02-10
Attending: NURSE PRACTITIONER
Payer: MEDICARE

## 2025-02-10 VITALS
DIASTOLIC BLOOD PRESSURE: 82 MMHG | HEART RATE: 78 BPM | SYSTOLIC BLOOD PRESSURE: 130 MMHG | RESPIRATION RATE: 16 BRPM | HEIGHT: 60 IN | WEIGHT: 142.8 LBS | OXYGEN SATURATION: 93 % | BODY MASS INDEX: 28.03 KG/M2

## 2025-02-10 DIAGNOSIS — I48.0 PAROXYSMAL ATRIAL FIBRILLATION (HCC): ICD-10-CM

## 2025-02-10 DIAGNOSIS — R25.2 LEG CRAMPING: ICD-10-CM

## 2025-02-10 DIAGNOSIS — I47.10 PAROXYSMAL SUPRAVENTRICULAR TACHYCARDIA (HCC): ICD-10-CM

## 2025-02-10 LAB — EKG IMPRESSION: NORMAL

## 2025-02-10 PROCEDURE — RXMED WILLOW AMBULATORY MEDICATION CHARGE: Performed by: NURSE PRACTITIONER

## 2025-02-10 PROCEDURE — 93005 ELECTROCARDIOGRAM TRACING: CPT | Mod: TC | Performed by: NURSE PRACTITIONER

## 2025-02-10 PROCEDURE — 99213 OFFICE O/P EST LOW 20 MIN: CPT | Performed by: DENTIST

## 2025-02-10 PROCEDURE — 99214 OFFICE O/P EST MOD 30 MIN: CPT | Performed by: NURSE PRACTITIONER

## 2025-02-10 RX ORDER — DRONEDARONE 400 MG/1
400 TABLET, FILM COATED ORAL 2 TIMES DAILY WITH MEALS
Qty: 180 TABLET | Refills: 3 | Status: SHIPPED | OUTPATIENT
Start: 2025-02-10

## 2025-02-10 ASSESSMENT — ENCOUNTER SYMPTOMS
NAUSEA: 0
GASTROINTESTINAL NEGATIVE: 1
MUSCULOSKELETAL NEGATIVE: 1
WHEEZING: 0
BRUISES/BLEEDS EASILY: 0
ORTHOPNEA: 0
CLAUDICATION: 0
EYES NEGATIVE: 1
NEUROLOGICAL NEGATIVE: 1
DEPRESSION: 0
PND: 0
NERVOUS/ANXIOUS: 0
SENSORY CHANGE: 0
RESPIRATORY NEGATIVE: 1
CONSTITUTIONAL NEGATIVE: 1
DIZZINESS: 0
PALPITATIONS: 0
SHORTNESS OF BREATH: 0
HALLUCINATIONS: 0

## 2025-02-10 NOTE — ASSESSMENT & PLAN NOTE
- short salvos on monitor    - no SVT longer than 3.5 minutes   - AF all less than 2 minutes   - Tolerating  Eliquis fine   - CHADS2 score 5   - start Multaq for rhythm control   - Lab work: CMP, TSH, magnesium   - echo normal   - stress normal    - continue metoprolol    - follow p in 6 months

## 2025-02-10 NOTE — TELEPHONE ENCOUNTER
Met with patient in her Cardio appt due to the new start request from her Cardio Provider to start her on Multaq 400mg tabs. Provider wanted to ensure affordability for Multaq for patient and see if we could get a lower cost on her Eliquis as well.     Patient's 30 day supplies are a higher cost through her insurance for both Multaq and Eliquis.   Multaq comes to $132.30 / 30 days, and Eliquis is around $130 / 30 days as well.     Patient receives a lower cost through insurance than initially quoted for a 90 day supply through our discount program. Patient will be receiving Multaq x 30 day samples due to stock and new start changing from Flecainide. She will also be receiving a 90 day supply of Eliquis for $150 through insurance.     Patient is picking up her Eliquis x 90 days and Multaq x 30 days from [facility] today before heading back home. She consented to our specialty services and understands that Kathleen or I will contact her for her refills going forward to set up her mail delivery for no additional cost.    I provided the patient with my business card in case she has any questions going forward, and she is very grateful for the assistance.

## 2025-02-10 NOTE — PROGRESS NOTES
Atrial Fibrillation Follow Up Note    DOS: 2/10/2025   [id number]  Ezio Mora    Chief complaint/Reason for consult: atrial fibrillation and new LBBB    HPI: Pt is a 74 y.o. female who presents to the clinic today in follow up for atrial fibrillation and new finding of LBBB. Patient has a past medical history significant for but not limited to: moderate persistent asthma, hypertension, paroxysmal atrial fibrillation, h/o CVA, chronic anticoagulation, LBBB, CAD from lipid plaques. Patient ischemic testing negative. EKG today still shows LBBB. Echocardiogram showed normal LVEF and valves. In atrial fibrillation during echocardiogram. Monitor showed 211 episodes of SVT with some of those episodes actually being atrial fibrillation versus atrial tachycardia. No episode longer than 3 minutes and 35 seconds. Flecainide out due to left bundle. Multaq will be started. Continue other medications as directed. Some cramping in legs and hands. Get lab work for assessment. 6 month follow up.       Past Medical History:   Diagnosis Date    ASTHMA     Atrial fibrillation (HCC)     Chronic fatigue 09/28/2016    Degenerative arthritis of spine     Hyperlipidemia     Hypertension     Mild intermittent asthma with acute exacerbation 06/18/2015    Post-menopause on HRT (hormone replacement therapy)     Psychogenic vocal cord dysfunction 12/06/2024    Pulmonary nodule 06/28/2016    Pulmonary, stable CT 8/15    Rheumatic fever     SVT (supraventricular tachycardia) (HCC)     Weight gain 06/28/2016       Past Surgical History:   Procedure Laterality Date    FUSION, SPINE, LUMBAR, PLIF      PRIMARY C SECTION      TUBAL LIGATION         Social History     Socioeconomic History    Marital status:      Spouse name: Not on file    Number of children: Not on file    Years of education: Not on file    Highest education level: Not on file   Occupational History    Not on file   Tobacco Use    Smoking status: Never    Smokeless  tobacco: Never   Vaping Use    Vaping status: Never Used   Substance and Sexual Activity    Alcohol use: Yes     Alcohol/week: 0.6 - 1.2 oz     Types: 1 - 2 Glasses of wine per week     Comment: occ wine    Drug use: Never    Sexual activity: Not on file   Other Topics Concern    Not on file   Social History Narrative         Social Drivers of Health     Financial Resource Strain: Not on file   Food Insecurity: Not on file   Transportation Needs: Not on file   Physical Activity: Not on file   Stress: Not on file   Social Connections: Not on file   Intimate Partner Violence: Not on file   Housing Stability: Not on file       Family History   Problem Relation Age of Onset    Other Mother         global decline due to age    Heart Disease Father     Thyroid Child        Allergies   Allergen Reactions    Lisinopril Unspecified    Terramycin [Oxytetracycline]      Rash as a child        Current Outpatient Medications   Medication Sig Dispense Refill    dronedarone (MULTAQ) 400 MG Tab Take 1 Tablet by mouth 2 times a day with meals. 180 Tablet 3    apixaban (ELIQUIS) 5mg Tab Take 1 Tablet by mouth 2 times a day. 180 Tablet 0    traZODone (DESYREL) 50 MG Tab Take 1 Tablet by mouth every evening. Needs to establish new PCP 90 Tablet 0    albuterol 108 (90 Base) MCG/ACT Aero Soln inhalation aerosol INHALE 2 PUFFS BY MOUTH EVERY 6 HOURS AS NEEDED FOR SHORTNESS OF BREATH 8.5 g 0    atorvastatin (LIPITOR) 80 MG tablet TAKE 1 TABLET BY MOUTH EVERY EVENING 80 Tablet 2    budesonide-formoterol (SYMBICORT) 160-4.5 MCG/ACT Aerosol Inhale 2 Puffs 2 times a day. Use spacer. Rinse mouth after each use. 30.6 g 3    Spacer/Aero-Holding Chambers Device 2 Puffs 2 times a day. Use with Symbicort and Xopenex. 1 Each 11    montelukast (SINGULAIR) 10 MG Tab Take 1 Tablet by mouth every day. 90 Tablet 3    metoprolol tartrate (LOPRESSOR) 25 MG Tab Take 1 Tablet by mouth 2 times a day. 200 Tablet 3    losartan (COZAAR) 100 MG Tab Take 1  Tablet by mouth every day. 90 Tablet 3    Magnesium Citrate 100 MG Tab Take  by mouth every day.      Calcium Carbonate 1500 (600 Ca) MG Tab Take  by mouth every day.      vitamin D (VITAMIND D3) 1000 UNIT Tab Take 1 Tablet by mouth every day.       No current facility-administered medications for this visit.       Vitals:    02/10/25 0833   BP: 130/82   Pulse: 78   Resp: 16   SpO2: 93%         Review of Systems   Constitutional: Negative.  Negative for malaise/fatigue.   HENT: Negative.     Eyes: Negative.    Respiratory: Negative.  Negative for shortness of breath and wheezing.    Cardiovascular:  Negative for chest pain, palpitations, orthopnea, claudication, leg swelling and PND.   Gastrointestinal: Negative.  Negative for nausea.   Genitourinary: Negative.    Musculoskeletal: Negative.    Skin: Negative.    Neurological: Negative.  Negative for dizziness and sensory change.   Endo/Heme/Allergies: Negative.  Does not bruise/bleed easily.   Psychiatric/Behavioral:  Negative for depression and hallucinations. The patient is not nervous/anxious.         Prior echo results reviewed: LVEF 65%; valves normal     Prior cath/stress results reviewed: no ischemia    EKG interpreted by me: Sinus    Physical Exam  Constitutional:       Appearance: Normal appearance.   HENT:      Head: Normocephalic.   Eyes:      Pupils: Pupils are equal, round, and reactive to light.   Neck:      Vascular: No JVD.   Cardiovascular:      Rate and Rhythm: Normal rate and regular rhythm.      Pulses: Normal pulses.      Heart sounds: Normal heart sounds.   Pulmonary:      Effort: Pulmonary effort is normal.      Breath sounds: Normal breath sounds.   Abdominal:      General: Abdomen is flat.      Palpations: Abdomen is soft.   Musculoskeletal:      Cervical back: Normal range of motion.      Right lower leg: No edema.      Left lower leg: No edema.   Skin:     General: Skin is warm and dry.   Neurological:      Mental Status: She is alert and  "oriented to person, place, and time.   Psychiatric:         Mood and Affect: Mood normal.         Behavior: Behavior normal.          Data:  Lipids:   Lab Results   Component Value Date/Time    CHOLSTRLTOT 176 09/24/2024 12:00 AM    TRIGLYCERIDE 52 09/24/2024 12:00 AM    HDL 99 09/24/2024 12:00 AM    LDL 65 09/24/2024 12:00 AM        BMP:  Lab Results   Component Value Date/Time    SODIUM 139 08/30/2022 0309    POTASSIUM 4.0 08/30/2022 0309    CHLORIDE 105 08/30/2022 0309    CO2 23 08/30/2022 0309    GLUCOSE 100 (H) 08/30/2022 0309    BUN 11 08/30/2022 0309    CREATININE 0.60 08/30/2022 0309    CALCIUM 9.2 08/30/2022 0309    ANION 11.0 08/30/2022 0309       GFR:  Lab Results   Component Value Date/Time    IFAFRICA >60 11/18/2019 0807    IFNOTAFR >60 11/18/2019 0807        TSH:   Lab Results   Component Value Date/Time    TSHULTRASEN 2.220 11/18/2019 0807       MAGNESIUM:  Lab Results   Component Value Date/Time    MAGNESIUM 2.1 08/30/2022 0309        THYROXINE (T4):   No results found for: \"FREEDIR\"     CBC:   Lab Results   Component Value Date/Time    WBC 6.7 08/30/2022 03:09 AM    RBC 4.38 08/30/2022 03:09 AM    HEMOGLOBIN 14.2 08/30/2022 03:09 AM    HEMATOCRIT 42.0 08/30/2022 03:09 AM    MCV 95.9 08/30/2022 03:09 AM    MCH 32.4 08/30/2022 03:09 AM    MCHC 33.8 08/30/2022 03:09 AM    RDW 46.5 08/30/2022 03:09 AM    PLATELETCT 324 08/30/2022 03:09 AM    MPV 8.9 (L) 08/30/2022 03:09 AM    NEUTSPOLYS 55.90 08/30/2022 03:09 AM    LYMPHOCYTES 26.70 08/30/2022 03:09 AM    MONOCYTES 9.10 08/30/2022 03:09 AM    EOSINOPHILS 7.70 (H) 08/30/2022 03:09 AM    BASOPHILS 0.30 08/30/2022 03:09 AM    IMMGRAN 0.30 08/30/2022 03:09 AM    NRBC 0.00 08/30/2022 03:09 AM    NEUTS 3.76 08/30/2022 03:09 AM    LYMPHS 1.80 08/30/2022 03:09 AM    MONOS 0.61 08/30/2022 03:09 AM    EOS 0.52 (H) 08/30/2022 03:09 AM    BASO 0.02 08/30/2022 03:09 AM    IMMGRANAB 0.02 08/30/2022 03:09 AM    NRBCAB 0.00 08/30/2022 03:09 AM        CBC w/o DIFF  Lab " "Results   Component Value Date/Time    WBC 6.7 08/30/2022 03:09 AM    RBC 4.38 08/30/2022 03:09 AM    HEMOGLOBIN 14.2 08/30/2022 03:09 AM    MCV 95.9 08/30/2022 03:09 AM    MCH 32.4 08/30/2022 03:09 AM    MCHC 33.8 08/30/2022 03:09 AM    RDW 46.5 08/30/2022 03:09 AM    MPV 8.9 (L) 08/30/2022 03:09 AM       LIVER:  Lab Results   Component Value Date/Time    ALKPHOSPHAT 67 08/30/2022 03:09 AM    ASTSGOT 14 08/30/2022 03:09 AM    ALTSGPT 14 08/30/2022 03:09 AM    TBILIRUBIN 0.5 08/30/2022 03:09 AM       BNP:  No results found for: \"BNPBTYPENAT\"    PT/INR:  No results found for: \"PROTHROMBTM\", \"INR\"          Impression/Plan:  Paroxysmal atrial fibrillation (HCC)   - short salvos on monitor    - no SVT longer than 3.5 minutes   - AF all less than 2 minutes   - Tolerating  Eliquis fine   - CHADS2 score 5   - start Multaq for rhythm control   - Lab work: CMP, TSH, magnesium   - echo normal   - stress normal    - continue metoprolol    - follow p in 6 months                A total of 32 minutes of time was spent on day of encounter reviewing medical record, performing history and examination, counseling, ordering medication/test/consults, collaborating with referring service, and documentation.    Stefan Tucker [facility]-EP  Cardiac Electrophysiology    "

## 2025-02-18 ENCOUNTER — RESULTS FOLLOW-UP (OUTPATIENT)
Dept: CARDIOLOGY | Facility: MEDICAL CENTER | Age: 75
End: 2025-02-18

## 2025-02-18 DIAGNOSIS — I48.0 PAROXYSMAL ATRIAL FIBRILLATION (HCC): ICD-10-CM

## 2025-02-18 DIAGNOSIS — R25.2 LEG CRAMPING: ICD-10-CM

## 2025-02-20 DIAGNOSIS — I48.0 PAROXYSMAL ATRIAL FIBRILLATION (HCC): ICD-10-CM

## 2025-02-20 RX ORDER — APIXABAN 5 MG/1
5 TABLET, FILM COATED ORAL 2 TIMES DAILY
Qty: 180 TABLET | Refills: 0 | Status: CANCELLED | OUTPATIENT
Start: 2025-02-20

## 2025-02-25 DIAGNOSIS — I10 ESSENTIAL HYPERTENSION: ICD-10-CM

## 2025-02-25 RX ORDER — LOSARTAN POTASSIUM 100 MG/1
100 TABLET ORAL DAILY
Qty: 90 TABLET | Refills: 3 | Status: SHIPPED | OUTPATIENT
Start: 2025-02-25

## 2025-03-05 PROCEDURE — RXMED WILLOW AMBULATORY MEDICATION CHARGE: Performed by: NURSE PRACTITIONER

## 2025-03-06 ENCOUNTER — PHARMACY VISIT (OUTPATIENT)
Dept: PHARMACY | Facility: MEDICAL CENTER | Age: 75
End: 2025-03-06
Payer: COMMERCIAL

## 2025-03-07 ENCOUNTER — TELEPHONE (OUTPATIENT)
Dept: CARDIOLOGY | Facility: MEDICAL CENTER | Age: 75
End: 2025-03-07
Payer: MEDICARE

## 2025-03-07 DIAGNOSIS — E78.5 DYSLIPIDEMIA: ICD-10-CM

## 2025-03-07 NOTE — TELEPHONE ENCOUNTER
Is the patient due for a refill? Yes    Was the patient seen the last 15 months? Yes    Date of last office visit: 2/10/2025    Does the patient have an upcoming appointment?  Yes   If yes, When? 8/11/2025    Provider to refill:MT    Does the patient have [facility] and need 100-day supply? (This applies to ALL medications) Yes, quantity updated to 100 days

## 2025-03-07 NOTE — TELEPHONE ENCOUNTER
Received request via: Patient    Was the patient seen in the last year in this department? Yes    Does the patient have an active prescription (recently filled or refills available) for medication(s) requested? Yes.     Pharmacy Name: [facility]  FAX: [phone]    Does the patient have [facility] and need 100-day supply? (This applies to ALL medications) Patient does not have SCP     Patient is asking JI or MT to take over the medication of   atorvastatin (LIPITOR) 80 MG tablet  because PCP is no longer with [facility].    Thank you,  Amy FRENCH

## 2025-03-07 NOTE — TELEPHONE ENCOUNTER
S/w patient and PCP who had been managing Atorvastatin has retired. Asking if MT will take it over.    MT- Please review, okay to refill statin under you. Her PCP retired. Thank you.

## 2025-03-11 ENCOUNTER — PATIENT MESSAGE (OUTPATIENT)
Dept: CARDIOLOGY | Facility: MEDICAL CENTER | Age: 75
End: 2025-03-11
Payer: MEDICARE

## 2025-03-11 ENCOUNTER — PATIENT MESSAGE (OUTPATIENT)
Dept: SLEEP MEDICINE | Facility: MEDICAL CENTER | Age: 75
End: 2025-03-11
Payer: MEDICARE

## 2025-03-11 DIAGNOSIS — I48.0 PAROXYSMAL ATRIAL FIBRILLATION (HCC): ICD-10-CM

## 2025-03-11 DIAGNOSIS — R91.1 PULMONARY NODULE: ICD-10-CM

## 2025-03-11 DIAGNOSIS — E78.5 DYSLIPIDEMIA: ICD-10-CM

## 2025-03-11 DIAGNOSIS — J45.40 MODERATE PERSISTENT ASTHMA WITHOUT COMPLICATION: ICD-10-CM

## 2025-03-11 DIAGNOSIS — J45.20 MILD INTERMITTENT ASTHMA WITHOUT COMPLICATION: ICD-10-CM

## 2025-03-11 DIAGNOSIS — I10 ESSENTIAL HYPERTENSION: ICD-10-CM

## 2025-03-11 RX ORDER — ATORVASTATIN CALCIUM 80 MG/1
80 TABLET, FILM COATED ORAL NIGHTLY
Qty: 90 TABLET | Refills: 1 | Status: SHIPPED | OUTPATIENT
Start: 2025-03-11

## 2025-03-11 RX ORDER — LOSARTAN POTASSIUM 100 MG/1
100 TABLET ORAL DAILY
Qty: 90 TABLET | Refills: 1 | Status: SHIPPED | OUTPATIENT
Start: 2025-03-11

## 2025-03-11 RX ORDER — ATORVASTATIN CALCIUM 80 MG/1
80 TABLET, FILM COATED ORAL NIGHTLY
Qty: 100 TABLET | Refills: 3 | Status: SHIPPED | OUTPATIENT
Start: 2025-03-11 | End: 2025-03-11 | Stop reason: SDUPTHER

## 2025-03-11 RX ORDER — METOPROLOL TARTRATE 25 MG/1
25 TABLET, FILM COATED ORAL 2 TIMES DAILY
Qty: 180 TABLET | Refills: 1 | Status: SHIPPED | OUTPATIENT
Start: 2025-03-11

## 2025-03-11 NOTE — TELEPHONE ENCOUNTER
Franco Roe M.D.  You1 minute ago (8:32 AM)       OK to refill statin under me as requested.  Thank you.  YARED

## 2025-03-22 RX ORDER — FLUTICASONE PROPIONATE AND SALMETEROL 250; 50 UG/1; UG/1
1 POWDER RESPIRATORY (INHALATION) EVERY 12 HOURS
Qty: 180 EACH | Refills: 0 | Status: SHIPPED | OUTPATIENT
Start: 2025-03-22

## 2025-03-22 RX ORDER — MONTELUKAST SODIUM 10 MG/1
10 TABLET ORAL
Qty: 90 TABLET | Refills: 3 | Status: SHIPPED | OUTPATIENT
Start: 2025-03-22

## 2025-04-21 LAB — EKG IMPRESSION: NORMAL

## 2025-05-05 DIAGNOSIS — I48.0 PAROXYSMAL ATRIAL FIBRILLATION (HCC): ICD-10-CM

## 2025-05-06 PROCEDURE — RXMED WILLOW AMBULATORY MEDICATION CHARGE: Performed by: NURSE PRACTITIONER

## 2025-05-12 ENCOUNTER — PHARMACY VISIT (OUTPATIENT)
Dept: PHARMACY | Facility: MEDICAL CENTER | Age: 75
End: 2025-05-12
Payer: COMMERCIAL

## 2025-06-04 PROCEDURE — RXMED WILLOW AMBULATORY MEDICATION CHARGE: Performed by: NURSE PRACTITIONER

## 2025-06-05 ENCOUNTER — PHARMACY VISIT (OUTPATIENT)
Dept: PHARMACY | Facility: MEDICAL CENTER | Age: 75
End: 2025-06-05
Payer: COMMERCIAL

## 2025-06-24 ENCOUNTER — TELEPHONE (OUTPATIENT)
Dept: CARDIOLOGY | Facility: MEDICAL CENTER | Age: 75
End: 2025-06-24
Payer: MEDICARE

## 2025-06-24 PROBLEM — M16.12 PRIMARY OSTEOARTHRITIS OF LEFT HIP: Status: ACTIVE | Noted: 2025-06-24

## 2025-06-24 NOTE — LETTER
PROCEDURE/SURGERY CLEARANCE FORM    Date: 6/25/2025   Patient Name: Ezio Mora    Dear Surgeon or Proceduralist,     The above patient is cleared to have the following procedure/surgery: Left Total Hip Arthroplasty     Thank you for your request for cardiac stratification of our mutual patient Ezio Mora 1950. We have reviewed their [facility] records; and to the best of our understanding this patient has not had stenting, ablation, watchman, cardiothoracic surgery or hospitalization for cardiovascular reasons in the past 6 months.  Ezio Mora has been seen within the past 15 months and is considered to have non-modifiable cardiac risk for this low-risk procedure/surgery. They may proceed from a cardiovascular standpoint and may hold their antiplatelet/anticoagulation as briefly as possible. Please have patient resume this medication when hemodynamically stable to do so.     Aspirin or Prasugrel   - hold 7 days prior to procedure/surgery, resume when hemodynamically stable      Clopidrogrel or Ticagrelor  - hold 7 days for all neurological procedures, hold 5 days prior to all other procedure/surgery,  resume when hemodynamically stable     Warfarin - hold 7 days for all neurological procedures, hold 5 days prior to all other procedure/surgery and coordinate with [facility] ([phone]) INR testing and dose management.      Pradaxa/Xarelto/Eliquis/Savesya - hold 1 day prior to procedure for low bleeding risk procedure, 2 days for high bleeding risk procedure, or consider holding 3 days or longer for patients with reduced kidney function (CrCl <30mL/min) or spinal/cranial surgeries/procedures.      If they have a mechanical heart valve, please coordinate with [facility] Anticoagulation Service ([phone]) the proper management of their anticoagulant in the periprocedural or perioperative period.      Some patients have higher risk for cardiovascular complications  or holding medication. If our patient has had prior complications of holding antiplatelet or anticoagulants in the past and we have seen them after these events, we have addressed these concerns with the patient. They are at an unknown degree of increased risk for recurrent complication.  You may hold anticoagulation/antiplatelets for the procedure or surgery if the benefits of the procedure or surgery outweigh this nonmodifiable risk.      If Ezio Mora 1950 has new symptoms of heart failure decompensation, unstable arrythmia, or angina please reach out and we will assess the patient.      If you have other patient-specific concerns, please feel free to reach out to the patient's cardiologist directly at [phone].     Thank you,   [facility]    __ _Electronically signed________  Provider: FRANCIS Bowers

## 2025-06-24 NOTE — LETTER
PROCEDURE/SURGERY CLEARANCE FORM    Date: 6/25/2025   Patient Name: Ezio Mora    Dear Surgeon or Proceduralist,     The above patient is cleared to have the following procedure/surgery: Left Total Hip Arthroplasty     Thank you for your request for cardiac stratification of our mutual patient Ezio Mora 1950. We have reviewed their [facility] records; and to the best of our understanding this patient has not had stenting, ablation, watchman, cardiothoracic surgery or hospitalization for cardiovascular reasons in the past 6 months.  Ezio Mora has been seen within the past 15 months and is considered to have non-modifiable cardiac risk for this low-risk procedure/surgery. They may proceed from a cardiovascular standpoint and may hold their antiplatelet/anticoagulation as briefly as possible. Please have patient resume this medication when hemodynamically stable to do so.     Aspirin or Prasugrel   - hold 7 days prior to procedure/surgery, resume when hemodynamically stable      Clopidrogrel or Ticagrelor  - hold 7 days for all neurological procedures, hold 5 days prior to all other procedure/surgery,  resume when hemodynamically stable     Warfarin - hold 7 days for all neurological procedures, hold 5 days prior to all other procedure/surgery and coordinate with [facility]tico[facility] ([phone]) INR testing and dose management.      Pradaxa/Xarelto/Eliquis/Savesya - hold 1 day prior to procedure for low bleeding risk procedure, 2 days for high bleeding risk procedure, or consider holding 3 days or longer for patients with reduced kidney function (CrCl <30mL/min) or spinal/cranial surgeries/procedures.      If they have a mechanical heart valve, please coordinate with [facility] Anticoagulation Service ([phone]) the proper management of their anticoagulant in the periprocedural or perioperative period.      Some patients have higher risk for cardiovascular complications  or holding medication. If our patient has had prior complications of holding antiplatelet or anticoagulants in the past and we have seen them after these events, we have addressed these concerns with the patient. They are at an unknown degree of increased risk for recurrent complication.  You may hold anticoagulation/antiplatelets for the procedure or surgery if the benefits of the procedure or surgery outweigh this nonmodifiable risk.      If Ezio Mora 1950 has new symptoms of heart failure decompensation, unstable arrythmia, or angina please reach out and we will assess the patient.      If you have other patient-specific concerns, please feel free to reach out to the patient's cardiologist directly at [phone].     Thank you,   [facility]    __ _Electronically signed________  Provider: FRANCIS Bowers

## 2025-06-25 NOTE — TELEPHONE ENCOUNTER
Last OV: 02/10/25  Proposed Surgery: Left Total Hip Arthroplasty  Surgery Date: TBD  Requesting Office Name: [facility]  Fax Number: [phone]  Preference of Location (default is surgery center unless specified by Cardiologist or ELIZABETH)  Prior Clearance Addressed: No    Is this a general clearance? YES   Anticoags/Antiplatelets: Apixaban   Anticoags/Antiplatelet managed by Cardiology? YES    Outstanding Cardiac Imaging : No  Ablation, Cardioversion, Stent, Cardiac Devices, Catheterization, Watchman: No  TAVR/Valve, Mitral Clip, Watchman (including open heart),: N/A   Recent Cardiac Hospitalization: No            When: N/A  History (cardiac history): Past Medical History[1]        Is this a dental clearance? NO  Ablation, Cardioversion, Watchman, Stents, Cath, Devices within the last 3 months? No   If yes- Send dental wait letter, do not forward to provider for review.     TAVR / Valve, Mitral clip within the last 6 months? No  If yes- Send dental wait letter, do not forward to provider for review.     If completing a general clearance, continue per protocol.           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

## 2025-07-14 ENCOUNTER — TELEPHONE (OUTPATIENT)
Dept: ORTHOPEDICS | Facility: MEDICAL CENTER | Age: 75
End: 2025-07-14
Payer: MEDICARE

## 2025-07-14 NOTE — TELEPHONE ENCOUNTER
I received messages that this patient had a number of questions about where she would like to schedule surgery.  I relayed information to our staff and called her today to see if I could personally answer any questions for her.  There was no answer so I left a message with our callback information.

## 2025-07-29 PROBLEM — M16.11 PRIMARY OSTEOARTHRITIS OF RIGHT HIP: Status: ACTIVE | Noted: 2025-06-24

## 2025-07-31 PROCEDURE — RXMED WILLOW AMBULATORY MEDICATION CHARGE: Performed by: NURSE PRACTITIONER

## 2025-08-04 ENCOUNTER — SPECIALTY PHARMACY (OUTPATIENT)
Dept: CARDIOLOGY | Facility: MEDICAL CENTER | Age: 75
End: 2025-08-04
Payer: MEDICARE

## 2025-08-05 ENCOUNTER — PHARMACY VISIT (OUTPATIENT)
Dept: PHARMACY | Facility: MEDICAL CENTER | Age: 75
End: 2025-08-05
Payer: COMMERCIAL

## 2025-08-14 DIAGNOSIS — J45.40 MODERATE PERSISTENT ASTHMA WITHOUT COMPLICATION: ICD-10-CM

## 2025-08-14 RX ORDER — BUDESONIDE AND FORMOTEROL FUMARATE DIHYDRATE 160; 4.5 UG/1; UG/1
AEROSOL RESPIRATORY (INHALATION)
Qty: 51 G | Refills: 0 | Status: SHIPPED | OUTPATIENT
Start: 2025-08-14

## 2025-08-26 ENCOUNTER — APPOINTMENT (OUTPATIENT)
Dept: CARDIOLOGY | Facility: MEDICAL CENTER | Age: 75
End: 2025-08-26
Attending: NURSE PRACTITIONER
Payer: MEDICARE